# Patient Record
Sex: MALE | Race: WHITE | Employment: OTHER | URBAN - NONMETROPOLITAN AREA
[De-identification: names, ages, dates, MRNs, and addresses within clinical notes are randomized per-mention and may not be internally consistent; named-entity substitution may affect disease eponyms.]

---

## 2017-05-23 ENCOUNTER — OFFICE VISIT (OUTPATIENT)
Dept: OTOLARYNGOLOGY | Age: 47
End: 2017-05-23

## 2017-05-23 ENCOUNTER — TELEPHONE (OUTPATIENT)
Dept: OTOLARYNGOLOGY | Age: 47
End: 2017-05-23

## 2017-05-23 VITALS
HEART RATE: 120 BPM | WEIGHT: 142.3 LBS | SYSTOLIC BLOOD PRESSURE: 120 MMHG | HEIGHT: 72 IN | BODY MASS INDEX: 19.27 KG/M2 | TEMPERATURE: 98.2 F | DIASTOLIC BLOOD PRESSURE: 70 MMHG | RESPIRATION RATE: 20 BRPM

## 2017-05-23 DIAGNOSIS — D66 FACTOR VIII DEFICIENCY (HCC): ICD-10-CM

## 2017-05-23 DIAGNOSIS — C01 MALIGNANT NEOPLASM OF BASE OF TONGUE (HCC): ICD-10-CM

## 2017-05-23 DIAGNOSIS — F10.11 HISTORY OF ALCOHOL ABUSE: ICD-10-CM

## 2017-05-23 DIAGNOSIS — C32.8 MALIGNANT NEOPLASM OVERLAPPING LARYNX SITE (HCC): ICD-10-CM

## 2017-05-23 DIAGNOSIS — K08.9 POOR DENTITION: ICD-10-CM

## 2017-05-23 DIAGNOSIS — H61.22 IMPACTED CERUMEN OF LEFT EAR: ICD-10-CM

## 2017-05-23 DIAGNOSIS — C32.1 MALIGNANT NEOPLASM OF SUPRAGLOTTIS (HCC): Primary | ICD-10-CM

## 2017-05-23 DIAGNOSIS — F17.200 SMOKER: ICD-10-CM

## 2017-05-23 PROCEDURE — 31575 DIAGNOSTIC LARYNGOSCOPY: CPT | Performed by: OTOLARYNGOLOGY

## 2017-05-23 PROCEDURE — 99203 OFFICE O/P NEW LOW 30 MIN: CPT | Performed by: OTOLARYNGOLOGY

## 2017-05-23 ASSESSMENT — ENCOUNTER SYMPTOMS
RHINORRHEA: 0
WHEEZING: 0
SHORTNESS OF BREATH: 1
ABDOMINAL PAIN: 0
CHOKING: 0
COUGH: 1
VOMITING: 0
FACIAL SWELLING: 0
SORE THROAT: 0
CHEST TIGHTNESS: 0
APNEA: 0
SINUS PRESSURE: 0
DIARRHEA: 0
COLOR CHANGE: 0
TROUBLE SWALLOWING: 0
NAUSEA: 0
VOICE CHANGE: 0
STRIDOR: 0

## 2017-05-31 ENCOUNTER — TELEPHONE (OUTPATIENT)
Dept: OTOLARYNGOLOGY | Age: 47
End: 2017-05-31

## 2017-07-05 ENCOUNTER — TELEPHONE (OUTPATIENT)
Dept: FAMILY MEDICINE CLINIC | Age: 47
End: 2017-07-05

## 2017-07-12 ENCOUNTER — OFFICE VISIT (OUTPATIENT)
Dept: ONCOLOGY | Age: 47
End: 2017-07-12

## 2017-07-12 VITALS
WEIGHT: 132.2 LBS | OXYGEN SATURATION: 95 % | TEMPERATURE: 102 F | DIASTOLIC BLOOD PRESSURE: 69 MMHG | HEART RATE: 155 BPM | HEIGHT: 72 IN | RESPIRATION RATE: 20 BRPM | SYSTOLIC BLOOD PRESSURE: 97 MMHG | BODY MASS INDEX: 17.9 KG/M2

## 2017-07-12 DIAGNOSIS — D66 FACTOR VIII DEFICIENCY (HCC): Primary | ICD-10-CM

## 2017-07-12 DIAGNOSIS — C76.0 RECENT DIAGNOSIS OF HEAD AND NECK CANCER (HCC): ICD-10-CM

## 2017-07-12 PROCEDURE — 99205 OFFICE O/P NEW HI 60 MIN: CPT | Performed by: INTERNAL MEDICINE

## 2017-07-12 RX ORDER — ONDANSETRON 4 MG/1
4 TABLET, FILM COATED ORAL PRN
COMMUNITY
Start: 2017-06-14 | End: 2017-08-07 | Stop reason: SDUPTHER

## 2017-07-12 RX ORDER — OXYCODONE HCL 5 MG/5 ML
5-10 SOLUTION, ORAL ORAL EVERY 4 HOURS PRN
COMMUNITY
Start: 2017-07-07 | End: 2017-07-16 | Stop reason: SDUPTHER

## 2017-07-12 RX ORDER — POTASSIUM CHLORIDE 750 MG/1
20 CAPSULE, EXTENDED RELEASE ORAL 2 TIMES DAILY
COMMUNITY
Start: 2017-06-15 | End: 2017-07-15

## 2017-07-12 ASSESSMENT — ENCOUNTER SYMPTOMS
RECTAL PAIN: 0
NAUSEA: 0
FACIAL SWELLING: 0
ABDOMINAL DISTENTION: 0
EYE DISCHARGE: 0
COLOR CHANGE: 0
BLOOD IN STOOL: 0
BACK PAIN: 0
WHEEZING: 0
ABDOMINAL PAIN: 0
DIARRHEA: 0
VOMITING: 0

## 2017-07-16 ENCOUNTER — HOSPITAL ENCOUNTER (EMERGENCY)
Age: 47
Discharge: HOME | End: 2017-07-16
Attending: EMERGENCY MEDICINE
Payer: MEDICARE

## 2017-07-16 ENCOUNTER — APPOINTMENT (OUTPATIENT)
Dept: GENERAL RADIOLOGY | Age: 47
End: 2017-07-16
Payer: MEDICARE

## 2017-07-16 VITALS
HEART RATE: 107 BPM | WEIGHT: 132 LBS | OXYGEN SATURATION: 95 % | TEMPERATURE: 99.3 F | DIASTOLIC BLOOD PRESSURE: 69 MMHG | RESPIRATION RATE: 20 BRPM | HEIGHT: 71 IN | SYSTOLIC BLOOD PRESSURE: 105 MMHG | BODY MASS INDEX: 18.48 KG/M2

## 2017-07-16 DIAGNOSIS — J20.9 COPD WITH ACUTE BRONCHITIS (HCC): ICD-10-CM

## 2017-07-16 DIAGNOSIS — C44.42 SQUAMOUS CELL CARCINOMA OF NECK: Primary | ICD-10-CM

## 2017-07-16 DIAGNOSIS — C32.1 SQUAMOUS CELL CANCER OF EPIGLOTTIS (HCC): ICD-10-CM

## 2017-07-16 DIAGNOSIS — J44.0 COPD WITH ACUTE BRONCHITIS (HCC): ICD-10-CM

## 2017-07-16 DIAGNOSIS — J44.9 CHRONIC OBSTRUCTIVE PULMONARY DISEASE, UNSPECIFIED COPD TYPE (HCC): ICD-10-CM

## 2017-07-16 DIAGNOSIS — C02.9 SQUAMOUS CELL CANCER OF TONGUE (HCC): ICD-10-CM

## 2017-07-16 DIAGNOSIS — M54.2 NECK PAIN: ICD-10-CM

## 2017-07-16 LAB
ANION GAP SERPL CALCULATED.3IONS-SCNC: 17 MEQ/L (ref 8–16)
BASOPHILS # BLD: 0.8 %
BASOPHILS ABSOLUTE: 0.1 THOU/MM3 (ref 0–0.1)
BUN BLDV-MCNC: 10 MG/DL (ref 7–22)
CALCIUM SERPL-MCNC: 9.3 MG/DL (ref 8.5–10.5)
CHLORIDE BLD-SCNC: 96 MEQ/L (ref 98–111)
CO2: 26 MEQ/L (ref 23–33)
CREAT SERPL-MCNC: 0.7 MG/DL (ref 0.4–1.2)
EOSINOPHIL # BLD: 1.3 %
EOSINOPHILS ABSOLUTE: 0.1 THOU/MM3 (ref 0–0.4)
GFR SERPL CREATININE-BSD FRML MDRD: > 90 ML/MIN/1.73M2
GLUCOSE BLD-MCNC: 116 MG/DL (ref 70–108)
HCT VFR BLD CALC: 38 % (ref 42–52)
HEMOGLOBIN: 12.5 GM/DL (ref 14–18)
LYMPHOCYTES # BLD: 17.2 %
LYMPHOCYTES ABSOLUTE: 1.7 THOU/MM3 (ref 1–4.8)
MACROCYTES: ABNORMAL
MCH RBC QN AUTO: 32.7 PG (ref 27–31)
MCHC RBC AUTO-ENTMCNC: 32.8 GM/DL (ref 33–37)
MCV RBC AUTO: 99.6 FL (ref 80–94)
MONOCYTES # BLD: 8.7 %
MONOCYTES ABSOLUTE: 0.9 THOU/MM3 (ref 0.4–1.3)
NUCLEATED RED BLOOD CELLS: 0 /100 WBC
PDW BLD-RTO: 12.9 % (ref 11.5–14.5)
PLATELET # BLD: 647 THOU/MM3 (ref 130–400)
PMV BLD AUTO: 9.7 MCM (ref 7.4–10.4)
POTASSIUM SERPL-SCNC: 3.9 MEQ/L (ref 3.5–5.2)
RBC # BLD: 3.81 MILL/MM3 (ref 4.7–6.1)
RBC # BLD: NORMAL 10*6/UL
SEG NEUTROPHILS: 72 %
SEGMENTED NEUTROPHILS ABSOLUTE COUNT: 7.1 THOU/MM3 (ref 1.8–7.7)
SODIUM BLD-SCNC: 139 MEQ/L (ref 135–145)
WBC # BLD: 9.8 THOU/MM3 (ref 4.8–10.8)

## 2017-07-16 PROCEDURE — 2580000003 HC RX 258: Performed by: EMERGENCY MEDICINE

## 2017-07-16 PROCEDURE — 6370000000 HC RX 637 (ALT 250 FOR IP): Performed by: EMERGENCY MEDICINE

## 2017-07-16 PROCEDURE — 85025 COMPLETE CBC W/AUTO DIFF WBC: CPT

## 2017-07-16 PROCEDURE — 36415 COLL VENOUS BLD VENIPUNCTURE: CPT

## 2017-07-16 PROCEDURE — 87147 CULTURE TYPE IMMUNOLOGIC: CPT

## 2017-07-16 PROCEDURE — 87186 SC STD MICRODIL/AGAR DIL: CPT

## 2017-07-16 PROCEDURE — 99283 EMERGENCY DEPT VISIT LOW MDM: CPT

## 2017-07-16 PROCEDURE — 80048 BASIC METABOLIC PNL TOTAL CA: CPT

## 2017-07-16 PROCEDURE — 87077 CULTURE AEROBIC IDENTIFY: CPT

## 2017-07-16 PROCEDURE — 71020 XR CHEST STANDARD TWO VW: CPT

## 2017-07-16 PROCEDURE — 87070 CULTURE OTHR SPECIMN AEROBIC: CPT

## 2017-07-16 PROCEDURE — 87205 SMEAR GRAM STAIN: CPT

## 2017-07-16 RX ORDER — AZITHROMYCIN 200 MG/5ML
250 POWDER, FOR SUSPENSION ORAL DAILY
Qty: 25.2 ML | Refills: 0 | Status: SHIPPED | OUTPATIENT
Start: 2017-07-16 | End: 2017-07-20

## 2017-07-16 RX ORDER — SODIUM CHLORIDE 9 MG/ML
INJECTION, SOLUTION INTRAVENOUS CONTINUOUS
Status: DISCONTINUED | OUTPATIENT
Start: 2017-07-16 | End: 2017-07-17 | Stop reason: HOSPADM

## 2017-07-16 RX ORDER — OXYCODONE HCL 5 MG/5 ML
SOLUTION, ORAL ORAL
Qty: 300 ML | Refills: 0 | Status: SHIPPED | OUTPATIENT
Start: 2017-07-16 | End: 2017-08-01 | Stop reason: ALTCHOICE

## 2017-07-16 RX ORDER — OXYCODONE HYDROCHLORIDE AND ACETAMINOPHEN 5; 325 MG/1; MG/1
2 TABLET ORAL ONCE
Status: COMPLETED | OUTPATIENT
Start: 2017-07-16 | End: 2017-07-16

## 2017-07-16 RX ADMIN — SODIUM CHLORIDE: 9 INJECTION, SOLUTION INTRAVENOUS at 21:10

## 2017-07-16 RX ADMIN — OXYCODONE AND ACETAMINOPHEN 2 TABLET: 5; 325 TABLET ORAL at 21:10

## 2017-07-16 ASSESSMENT — ENCOUNTER SYMPTOMS
BACK PAIN: 0
SORE THROAT: 0
COUGH: 1
EYE REDNESS: 0
SHORTNESS OF BREATH: 0
WHEEZING: 0
DIARRHEA: 0
ABDOMINAL PAIN: 0
VOMITING: 0
EYE DISCHARGE: 0
RHINORRHEA: 0
NAUSEA: 0

## 2017-07-16 ASSESSMENT — PAIN SCALES - GENERAL: PAINLEVEL_OUTOF10: 7

## 2017-07-19 LAB
GRAM STAIN RESULT: ABNORMAL
ORGANISM: ABNORMAL
RESPIRATORY CULTURE: ABNORMAL
RESPIRATORY CULTURE: ABNORMAL

## 2017-07-21 ASSESSMENT — ENCOUNTER SYMPTOMS
COUGH: 1
SHORTNESS OF BREATH: 1
CONSTIPATION: 1
SORE THROAT: 1
TROUBLE SWALLOWING: 1
CHEST TIGHTNESS: 1
VOICE CHANGE: 1

## 2017-07-24 ENCOUNTER — TELEPHONE (OUTPATIENT)
Dept: ONCOLOGY | Age: 47
End: 2017-07-24

## 2017-07-26 ENCOUNTER — TELEPHONE (OUTPATIENT)
Dept: FAMILY MEDICINE CLINIC | Age: 47
End: 2017-07-26

## 2017-07-26 ENCOUNTER — OFFICE VISIT (OUTPATIENT)
Dept: ONCOLOGY | Age: 47
End: 2017-07-26
Payer: MEDICARE

## 2017-07-26 ENCOUNTER — HOSPITAL ENCOUNTER (OUTPATIENT)
Dept: INFUSION THERAPY | Age: 47
Discharge: HOME | End: 2017-07-26
Payer: MEDICARE

## 2017-07-26 ENCOUNTER — TELEPHONE (OUTPATIENT)
Dept: ONCOLOGY | Age: 47
End: 2017-07-26

## 2017-07-26 VITALS
OXYGEN SATURATION: 94 % | HEIGHT: 71 IN | HEART RATE: 136 BPM | WEIGHT: 129.6 LBS | DIASTOLIC BLOOD PRESSURE: 70 MMHG | TEMPERATURE: 97.6 F | RESPIRATION RATE: 20 BRPM | SYSTOLIC BLOOD PRESSURE: 102 MMHG | BODY MASS INDEX: 18.15 KG/M2

## 2017-07-26 DIAGNOSIS — C76.0 RECENT DIAGNOSIS OF HEAD AND NECK CANCER (HCC): ICD-10-CM

## 2017-07-26 DIAGNOSIS — F10.11 HISTORY OF ALCOHOL ABUSE: Primary | ICD-10-CM

## 2017-07-26 DIAGNOSIS — G89.3 CANCER RELATED PAIN: ICD-10-CM

## 2017-07-26 PROCEDURE — 99214 OFFICE O/P EST MOD 30 MIN: CPT | Performed by: INTERNAL MEDICINE

## 2017-07-26 PROCEDURE — 99211 OFF/OP EST MAY X REQ PHY/QHP: CPT

## 2017-07-26 RX ORDER — PALONOSETRON 0.05 MG/ML
0.25 INJECTION, SOLUTION INTRAVENOUS ONCE
Status: CANCELLED | OUTPATIENT
Start: 2017-08-21

## 2017-07-26 RX ORDER — 0.9 % SODIUM CHLORIDE 0.9 %
10 VIAL (ML) INJECTION ONCE
Status: CANCELLED | OUTPATIENT
Start: 2017-08-21 | End: 2017-08-07

## 2017-07-26 RX ORDER — HEPARIN SODIUM (PORCINE) LOCK FLUSH IV SOLN 100 UNIT/ML 100 UNIT/ML
500 SOLUTION INTRAVENOUS PRN
Status: CANCELLED | OUTPATIENT
Start: 2017-08-21

## 2017-07-26 RX ORDER — DIPHENHYDRAMINE HYDROCHLORIDE 50 MG/ML
50 INJECTION INTRAMUSCULAR; INTRAVENOUS ONCE
Status: CANCELLED | OUTPATIENT
Start: 2017-08-21 | End: 2017-08-07

## 2017-07-26 RX ORDER — SODIUM CHLORIDE 0.9 % (FLUSH) 0.9 %
5 SYRINGE (ML) INJECTION PRN
Status: CANCELLED | OUTPATIENT
Start: 2017-08-21

## 2017-07-26 RX ORDER — METHYLPREDNISOLONE SODIUM SUCCINATE 125 MG/2ML
125 INJECTION, POWDER, LYOPHILIZED, FOR SOLUTION INTRAMUSCULAR; INTRAVENOUS ONCE
Status: CANCELLED | OUTPATIENT
Start: 2017-08-21 | End: 2017-08-07

## 2017-07-26 RX ORDER — SODIUM CHLORIDE 0.9 % (FLUSH) 0.9 %
10 SYRINGE (ML) INJECTION PRN
Status: CANCELLED | OUTPATIENT
Start: 2017-08-21

## 2017-07-26 RX ORDER — SODIUM CHLORIDE 9 MG/ML
INJECTION, SOLUTION INTRAVENOUS ONCE
Status: CANCELLED | OUTPATIENT
Start: 2017-08-21 | End: 2017-08-07

## 2017-07-26 RX ORDER — SODIUM CHLORIDE 9 MG/ML
INJECTION, SOLUTION INTRAVENOUS CONTINUOUS
Status: CANCELLED | OUTPATIENT
Start: 2017-08-21

## 2017-07-26 RX ORDER — HYDROCODONE BITARTRATE AND ACETAMINOPHEN 5; 325 MG/1; MG/1
1 TABLET ORAL EVERY 6 HOURS PRN
Qty: 60 TABLET | Refills: 0 | Status: SHIPPED | OUTPATIENT
Start: 2017-07-26 | End: 2017-08-01 | Stop reason: ALTCHOICE

## 2017-07-26 ASSESSMENT — ENCOUNTER SYMPTOMS
SORE THROAT: 1
BLOOD IN STOOL: 0
CONSTIPATION: 1
FACIAL SWELLING: 0
BACK PAIN: 0
WHEEZING: 0
ABDOMINAL PAIN: 0
DIARRHEA: 0
COLOR CHANGE: 0
RECTAL PAIN: 0
NAUSEA: 0
CHEST TIGHTNESS: 1
ABDOMINAL DISTENTION: 0
SHORTNESS OF BREATH: 1
COUGH: 1
VOMITING: 0
VOICE CHANGE: 1
EYE DISCHARGE: 0
TROUBLE SWALLOWING: 1

## 2017-07-31 ENCOUNTER — HOSPITAL ENCOUNTER (EMERGENCY)
Age: 47
Discharge: HOME | End: 2017-07-31
Attending: FAMILY MEDICINE
Payer: MEDICARE

## 2017-07-31 ENCOUNTER — TELEPHONE (OUTPATIENT)
Dept: ONCOLOGY | Age: 47
End: 2017-07-31

## 2017-07-31 VITALS
TEMPERATURE: 97.7 F | HEART RATE: 109 BPM | WEIGHT: 129 LBS | HEIGHT: 70 IN | RESPIRATION RATE: 22 BRPM | DIASTOLIC BLOOD PRESSURE: 91 MMHG | OXYGEN SATURATION: 98 % | BODY MASS INDEX: 18.47 KG/M2 | SYSTOLIC BLOOD PRESSURE: 118 MMHG

## 2017-07-31 DIAGNOSIS — C32.1 CANCER, EPIGLOTTIS (HCC): ICD-10-CM

## 2017-07-31 DIAGNOSIS — M54.2 NECK PAIN: Primary | ICD-10-CM

## 2017-07-31 PROCEDURE — 6360000002 HC RX W HCPCS: Performed by: FAMILY MEDICINE

## 2017-07-31 PROCEDURE — 99283 EMERGENCY DEPT VISIT LOW MDM: CPT

## 2017-07-31 PROCEDURE — 96372 THER/PROPH/DIAG INJ SC/IM: CPT

## 2017-07-31 RX ORDER — MORPHINE SULFATE 10 MG/5ML
5-10 SOLUTION ORAL EVERY 6 HOURS PRN
Qty: 100 ML | Refills: 0 | Status: SHIPPED | OUTPATIENT
Start: 2017-07-31 | End: 2017-08-03 | Stop reason: SDUPTHER

## 2017-07-31 RX ADMIN — HYDROMORPHONE HYDROCHLORIDE 2 MG: 1 INJECTION, SOLUTION INTRAMUSCULAR; INTRAVENOUS; SUBCUTANEOUS at 15:29

## 2017-07-31 ASSESSMENT — PAIN SCALES - GENERAL
PAINLEVEL_OUTOF10: 10
PAINLEVEL_OUTOF10: 10

## 2017-07-31 ASSESSMENT — ENCOUNTER SYMPTOMS
COUGH: 1
WHEEZING: 0
EYE DISCHARGE: 0
ABDOMINAL PAIN: 0

## 2017-07-31 ASSESSMENT — PAIN DESCRIPTION - LOCATION: LOCATION: NECK

## 2017-07-31 ASSESSMENT — PAIN DESCRIPTION - PAIN TYPE: TYPE: ACUTE PAIN

## 2017-08-01 ENCOUNTER — OFFICE VISIT (OUTPATIENT)
Dept: PHYSICAL MEDICINE AND REHAB | Age: 47
End: 2017-08-01
Payer: MEDICARE

## 2017-08-01 VITALS
BODY MASS INDEX: 18.78 KG/M2 | SYSTOLIC BLOOD PRESSURE: 113 MMHG | HEART RATE: 130 BPM | WEIGHT: 131.2 LBS | HEIGHT: 70 IN | DIASTOLIC BLOOD PRESSURE: 80 MMHG

## 2017-08-01 DIAGNOSIS — C10.0 CANCER OF VALLECULA EPIGLOTTICA (HCC): Primary | ICD-10-CM

## 2017-08-01 DIAGNOSIS — F10.11 HISTORY OF ALCOHOL ABUSE: ICD-10-CM

## 2017-08-01 DIAGNOSIS — F17.219 CIGARETTE NICOTINE DEPENDENCE WITH NICOTINE-INDUCED DISORDER: ICD-10-CM

## 2017-08-01 PROCEDURE — 99244 OFF/OP CNSLTJ NEW/EST MOD 40: CPT | Performed by: PAIN MEDICINE

## 2017-08-01 RX ORDER — POTASSIUM CHLORIDE 1500 MG/1
TABLET, FILM COATED, EXTENDED RELEASE ORAL
COMMUNITY
End: 2017-08-16 | Stop reason: CLARIF

## 2017-08-01 RX ORDER — FENTANYL 25 UG/H
1 PATCH TRANSDERMAL
Qty: 5 PATCH | Refills: 0 | Status: SHIPPED | OUTPATIENT
Start: 2017-08-01 | End: 2017-10-03 | Stop reason: CLARIF

## 2017-08-01 RX ORDER — POTASSIUM CHLORIDE 20MEQ/15ML
LIQUID (ML) ORAL
Refills: 0 | COMMUNITY
Start: 2017-06-20 | End: 2017-09-11 | Stop reason: CLARIF

## 2017-08-01 ASSESSMENT — ENCOUNTER SYMPTOMS
DIARRHEA: 0
BACK PAIN: 0
SINUS PRESSURE: 0
WHEEZING: 0
PHOTOPHOBIA: 0
ABDOMINAL PAIN: 0
EYE PAIN: 0
SORE THROAT: 0
NAUSEA: 0
RHINORRHEA: 0
VOMITING: 0
SHORTNESS OF BREATH: 0
COUGH: 0
CONSTIPATION: 0
COLOR CHANGE: 0
CHEST TIGHTNESS: 0

## 2017-08-03 ENCOUNTER — TELEPHONE (OUTPATIENT)
Dept: PHYSICAL MEDICINE AND REHAB | Age: 47
End: 2017-08-03

## 2017-08-03 DIAGNOSIS — F17.219 CIGARETTE NICOTINE DEPENDENCE WITH NICOTINE-INDUCED DISORDER: ICD-10-CM

## 2017-08-03 DIAGNOSIS — F10.11 HISTORY OF ALCOHOL ABUSE: ICD-10-CM

## 2017-08-03 DIAGNOSIS — C10.0 CANCER OF VALLECULA EPIGLOTTICA (HCC): ICD-10-CM

## 2017-08-03 RX ORDER — MORPHINE SULFATE 10 MG/5ML
10 SOLUTION ORAL EVERY 6 HOURS PRN
Qty: 600 ML | Refills: 0 | Status: SHIPPED | OUTPATIENT
Start: 2017-08-03 | End: 2017-08-17 | Stop reason: SDUPTHER

## 2017-08-04 ENCOUNTER — TELEPHONE (OUTPATIENT)
Dept: PHYSICAL MEDICINE AND REHAB | Age: 47
End: 2017-08-04

## 2017-08-07 ENCOUNTER — HOSPITAL ENCOUNTER (OUTPATIENT)
Dept: INFUSION THERAPY | Age: 47
Discharge: HOME | End: 2017-08-07
Payer: MEDICARE

## 2017-08-07 ENCOUNTER — TELEPHONE (OUTPATIENT)
Dept: PHYSICAL MEDICINE AND REHAB | Age: 47
End: 2017-08-07

## 2017-08-07 DIAGNOSIS — G89.3 CANCER RELATED PAIN: ICD-10-CM

## 2017-08-07 DIAGNOSIS — F10.11 HISTORY OF ALCOHOL ABUSE: ICD-10-CM

## 2017-08-07 DIAGNOSIS — C76.0 RECENT DIAGNOSIS OF HEAD AND NECK CANCER (HCC): ICD-10-CM

## 2017-08-07 RX ORDER — ONDANSETRON 4 MG/1
4 TABLET, FILM COATED ORAL EVERY 8 HOURS PRN
Qty: 30 TABLET | Refills: 1 | Status: SHIPPED | OUTPATIENT
Start: 2017-08-07 | End: 2017-10-03 | Stop reason: CLARIF

## 2017-08-08 ENCOUNTER — TELEPHONE (OUTPATIENT)
Dept: PHYSICAL MEDICINE AND REHAB | Age: 47
End: 2017-08-08

## 2017-08-11 ENCOUNTER — HOSPITAL ENCOUNTER (OUTPATIENT)
Dept: RADIATION ONCOLOGY | Age: 47
Discharge: HOME | End: 2017-08-11
Payer: MEDICARE

## 2017-08-11 PROCEDURE — 99202 OFFICE O/P NEW SF 15 MIN: CPT | Performed by: RADIOLOGY

## 2017-08-11 RX ORDER — FENTANYL 50 UG/H
1 PATCH TRANSDERMAL
Qty: 10 PATCH | Refills: 0 | Status: SHIPPED | OUTPATIENT
Start: 2017-08-11 | End: 2017-09-10

## 2017-08-14 ENCOUNTER — HOSPITAL ENCOUNTER (OUTPATIENT)
Dept: RADIATION ONCOLOGY | Age: 47
Discharge: HOME | End: 2017-08-14
Payer: MEDICARE

## 2017-08-14 ENCOUNTER — HOSPITAL ENCOUNTER (OUTPATIENT)
Dept: CT IMAGING | Age: 47
Discharge: HOME | End: 2017-08-14
Payer: MEDICARE

## 2017-08-14 DIAGNOSIS — C32.8 MALIGNANT NEOPLASM OF OVERLAPPING SITES OF LARYNX (HCC): ICD-10-CM

## 2017-08-14 PROCEDURE — 77334 RADIATION TREATMENT AID(S): CPT | Performed by: RADIOLOGY

## 2017-08-14 PROCEDURE — 77014 CT GUIDE PLACEMENT RADIATION THERAPY: CPT

## 2017-08-14 PROCEDURE — 77470 SPECIAL RADIATION TREATMENT: CPT | Performed by: RADIOLOGY

## 2017-08-14 PROCEDURE — 77332 RADIATION TREATMENT AID(S): CPT | Performed by: RADIOLOGY

## 2017-08-16 ENCOUNTER — OFFICE VISIT (OUTPATIENT)
Dept: PHYSICAL MEDICINE AND REHAB | Age: 47
End: 2017-08-16
Payer: MEDICARE

## 2017-08-16 VITALS
HEIGHT: 70 IN | DIASTOLIC BLOOD PRESSURE: 80 MMHG | WEIGHT: 131 LBS | SYSTOLIC BLOOD PRESSURE: 117 MMHG | BODY MASS INDEX: 18.75 KG/M2 | HEART RATE: 120 BPM

## 2017-08-16 DIAGNOSIS — M54.2 NECK PAIN: ICD-10-CM

## 2017-08-16 DIAGNOSIS — C80.1 NECK PAIN DUE TO MALIGNANT NEOPLASTIC DISEASE (HCC): ICD-10-CM

## 2017-08-16 DIAGNOSIS — M54.2 NECK PAIN DUE TO MALIGNANT NEOPLASTIC DISEASE (HCC): ICD-10-CM

## 2017-08-16 DIAGNOSIS — C10.0 CANCER OF VALLECULA EPIGLOTTICA (HCC): Primary | ICD-10-CM

## 2017-08-16 PROCEDURE — 99213 OFFICE O/P EST LOW 20 MIN: CPT | Performed by: NURSE PRACTITIONER

## 2017-08-16 ASSESSMENT — ENCOUNTER SYMPTOMS
TROUBLE SWALLOWING: 0
APNEA: 0
RHINORRHEA: 0
COLOR CHANGE: 0
COUGH: 0
VOMITING: 0
FACIAL SWELLING: 0
VOICE CHANGE: 0
BACK PAIN: 0
CHEST TIGHTNESS: 0
ABDOMINAL DISTENTION: 0
NAUSEA: 0
PHOTOPHOBIA: 0
ANAL BLEEDING: 0
SINUS PRESSURE: 0
CONSTIPATION: 0
DIARRHEA: 0
EYE DISCHARGE: 0
SORE THROAT: 0
EYE ITCHING: 0
EYE REDNESS: 0
SHORTNESS OF BREATH: 0
BLOOD IN STOOL: 0
STRIDOR: 0
RECTAL PAIN: 0
EYE PAIN: 0
ABDOMINAL PAIN: 0
WHEEZING: 0
CHOKING: 0

## 2017-08-17 ENCOUNTER — TELEPHONE (OUTPATIENT)
Dept: PHYSICAL MEDICINE AND REHAB | Age: 47
End: 2017-08-17

## 2017-08-17 PROCEDURE — 77301 RADIOTHERAPY DOSE PLAN IMRT: CPT | Performed by: RADIOLOGY

## 2017-08-17 PROCEDURE — 77300 RADIATION THERAPY DOSE PLAN: CPT | Performed by: RADIOLOGY

## 2017-08-17 RX ORDER — MORPHINE SULFATE 10 MG/5ML
20 SOLUTION ORAL EVERY 6 HOURS PRN
Qty: 200 ML | Refills: 0 | Status: SHIPPED | OUTPATIENT
Start: 2017-08-17 | End: 2017-08-22

## 2017-08-18 DIAGNOSIS — C76.0 RECENT DIAGNOSIS OF HEAD AND NECK CANCER (HCC): Primary | ICD-10-CM

## 2017-08-21 ENCOUNTER — OFFICE VISIT (OUTPATIENT)
Dept: ONCOLOGY | Age: 47
End: 2017-08-21
Payer: MEDICARE

## 2017-08-21 ENCOUNTER — HOSPITAL ENCOUNTER (OUTPATIENT)
Dept: INFUSION THERAPY | Age: 47
Discharge: HOME | End: 2017-08-21
Payer: MEDICARE

## 2017-08-21 ENCOUNTER — HOSPITAL ENCOUNTER (OUTPATIENT)
Dept: RADIATION ONCOLOGY | Age: 47
Discharge: HOME | End: 2017-08-21
Payer: MEDICARE

## 2017-08-21 VITALS
DIASTOLIC BLOOD PRESSURE: 63 MMHG | SYSTOLIC BLOOD PRESSURE: 110 MMHG | HEIGHT: 70 IN | RESPIRATION RATE: 20 BRPM | TEMPERATURE: 97.8 F | HEART RATE: 98 BPM | BODY MASS INDEX: 17.72 KG/M2 | OXYGEN SATURATION: 98 % | WEIGHT: 123.8 LBS

## 2017-08-21 VITALS
HEIGHT: 70 IN | WEIGHT: 123.8 LBS | RESPIRATION RATE: 20 BRPM | OXYGEN SATURATION: 94 % | SYSTOLIC BLOOD PRESSURE: 119 MMHG | DIASTOLIC BLOOD PRESSURE: 90 MMHG | BODY MASS INDEX: 17.72 KG/M2 | TEMPERATURE: 98.6 F | HEART RATE: 130 BPM

## 2017-08-21 DIAGNOSIS — C76.0 RECENT DIAGNOSIS OF HEAD AND NECK CANCER (HCC): ICD-10-CM

## 2017-08-21 DIAGNOSIS — F10.11 HISTORY OF ALCOHOL ABUSE: ICD-10-CM

## 2017-08-21 DIAGNOSIS — J44.9 CHRONIC OBSTRUCTIVE PULMONARY DISEASE, UNSPECIFIED COPD TYPE (HCC): ICD-10-CM

## 2017-08-21 DIAGNOSIS — Z51.11 CHEMOTHERAPY MANAGEMENT, ENCOUNTER FOR: ICD-10-CM

## 2017-08-21 DIAGNOSIS — C76.0 RECENT DIAGNOSIS OF HEAD AND NECK CANCER (HCC): Primary | ICD-10-CM

## 2017-08-21 LAB
ALBUMIN SERPL-MCNC: 3.7 G/DL (ref 3.5–5.1)
ALP BLD-CCNC: 72 U/L (ref 38–126)
ALT SERPL-CCNC: 7 U/L (ref 11–66)
AST SERPL-CCNC: 14 U/L (ref 5–40)
BASINOPHIL, AUTOMATED: 1 % (ref 0–12)
BILIRUB SERPL-MCNC: 0.4 MG/DL (ref 0.3–1.2)
BILIRUBIN DIRECT: < 0.2 MG/DL (ref 0–0.3)
BUN, WHOLE BLOOD: 10 MG/DL (ref 8–26)
CHLORIDE, WHOLE BLOOD: 97 MEQ/L (ref 98–109)
CREATININE, WHOLE BLOOD: 0.7 MG/DL (ref 0.5–1.2)
EOSINOPHILS RELATIVE PERCENT: 2 % (ref 0–12)
GFR, ESTIMATED: > 90 ML/MIN/1.73M2
GLUCOSE, WHOLE BLOOD: 123 MG/DL (ref 70–108)
HCT VFR BLD CALC: 42 % (ref 42–52)
HEMOGLOBIN: 13.8 GM/DL (ref 14–18)
IONIZED CALCIUM, WHOLE BLOOD: 1.24 MMOL/L (ref 1.12–1.32)
LYMPHOCYTES # BLD: 18 % (ref 15–47)
MCH RBC QN AUTO: 32.3 PG (ref 27–31)
MCHC RBC AUTO-ENTMCNC: 32.8 GM/DL (ref 33–37)
MCV RBC AUTO: 98 FL (ref 80–94)
MONOCYTES: 7 % (ref 0–12)
PDW BLD-RTO: 10.5 % (ref 11.5–14.5)
PLATELET # BLD: 691 THOU/MM3 (ref 130–400)
PMV BLD AUTO: 7.7 MCM (ref 7.4–10.4)
POTASSIUM, WHOLE BLOOD: 3.4 MEQ/L (ref 3.5–4.9)
RBC # BLD: 4.28 MILL/MM3 (ref 4.7–6.1)
SEG NEUTROPHILS: 72 % (ref 43–75)
SODIUM, WHOLE BLOOD: 137 MEQ/L (ref 138–146)
TOTAL CO2, WHOLE BLOOD: 27 MEQ/L (ref 23–33)
TOTAL PROTEIN: 8.3 G/DL (ref 6.1–8)
WBC # BLD: 12.3 THOU/MM3 (ref 4.8–10.8)

## 2017-08-21 PROCEDURE — 85025 COMPLETE CBC W/AUTO DIFF WBC: CPT

## 2017-08-21 PROCEDURE — 77338 DESIGN MLC DEVICE FOR IMRT: CPT | Performed by: RADIOLOGY

## 2017-08-21 PROCEDURE — 36415 COLL VENOUS BLD VENIPUNCTURE: CPT

## 2017-08-21 PROCEDURE — 96375 TX/PRO/DX INJ NEW DRUG ADDON: CPT

## 2017-08-21 PROCEDURE — 6360000002 HC RX W HCPCS: Performed by: INTERNAL MEDICINE

## 2017-08-21 PROCEDURE — 80076 HEPATIC FUNCTION PANEL: CPT

## 2017-08-21 PROCEDURE — 80047 BASIC METABLC PNL IONIZED CA: CPT

## 2017-08-21 PROCEDURE — 96366 THER/PROPH/DIAG IV INF ADDON: CPT

## 2017-08-21 PROCEDURE — 96415 CHEMO IV INFUSION ADDL HR: CPT

## 2017-08-21 PROCEDURE — 99214 OFFICE O/P EST MOD 30 MIN: CPT | Performed by: INTERNAL MEDICINE

## 2017-08-21 PROCEDURE — 96367 TX/PROPH/DG ADDL SEQ IV INF: CPT

## 2017-08-21 PROCEDURE — 2580000003 HC RX 258: Performed by: INTERNAL MEDICINE

## 2017-08-21 PROCEDURE — G0463 HOSPITAL OUTPT CLINIC VISIT: HCPCS

## 2017-08-21 PROCEDURE — 96413 CHEMO IV INFUSION 1 HR: CPT

## 2017-08-21 RX ORDER — PROMETHAZINE HYDROCHLORIDE 6.25 MG/5ML
6.25 SYRUP ORAL EVERY 6 HOURS PRN
Qty: 240 ML | Refills: 1 | Status: SHIPPED | OUTPATIENT
Start: 2017-08-21 | End: 2017-08-28

## 2017-08-21 RX ORDER — DEXAMETHASONE 4 MG/1
8 TABLET ORAL
Qty: 6 TABLET | Refills: 2 | Status: SHIPPED | OUTPATIENT
Start: 2017-08-22 | End: 2017-08-25

## 2017-08-21 RX ORDER — SODIUM CHLORIDE 9 MG/ML
INJECTION, SOLUTION INTRAVENOUS ONCE
Status: COMPLETED | OUTPATIENT
Start: 2017-08-21 | End: 2017-08-21

## 2017-08-21 RX ORDER — PALONOSETRON 0.05 MG/ML
0.25 INJECTION, SOLUTION INTRAVENOUS ONCE
Status: COMPLETED | OUTPATIENT
Start: 2017-08-21 | End: 2017-08-21

## 2017-08-21 RX ADMIN — SODIUM CHLORIDE 150 MG: 9 INJECTION, SOLUTION INTRAVENOUS at 12:33

## 2017-08-21 RX ADMIN — DEXAMETHASONE SODIUM PHOSPHATE 12 MG: 4 INJECTION, SOLUTION INTRAMUSCULAR; INTRAVENOUS at 12:03

## 2017-08-21 RX ADMIN — POTASSIUM CHLORIDE: 2 INJECTION, SOLUTION, CONCENTRATE INTRAVENOUS at 10:58

## 2017-08-21 RX ADMIN — POTASSIUM CHLORIDE: 2 INJECTION, SOLUTION, CONCENTRATE INTRAVENOUS at 16:23

## 2017-08-21 RX ADMIN — MANNITOL: 250 INJECTION, SOLUTION INTRAVENOUS at 13:13

## 2017-08-21 RX ADMIN — SODIUM CHLORIDE: 9 INJECTION, SOLUTION INTRAVENOUS at 10:55

## 2017-08-21 RX ADMIN — PALONOSETRON HYDROCHLORIDE 0.25 MG: 0.25 INJECTION INTRAVENOUS at 12:02

## 2017-08-21 ASSESSMENT — PAIN DESCRIPTION - PROGRESSION: CLINICAL_PROGRESSION: NOT CHANGED

## 2017-08-21 ASSESSMENT — ENCOUNTER SYMPTOMS
COUGH: 1
FACIAL SWELLING: 0
DIARRHEA: 0
RECTAL PAIN: 0
COLOR CHANGE: 0
SHORTNESS OF BREATH: 1
CONSTIPATION: 1
ABDOMINAL DISTENTION: 0
SORE THROAT: 1
TROUBLE SWALLOWING: 1
BACK PAIN: 0
BLOOD IN STOOL: 0
ABDOMINAL PAIN: 0
CHEST TIGHTNESS: 1
NAUSEA: 0
WHEEZING: 0
VOICE CHANGE: 1
VOMITING: 0
EYE DISCHARGE: 0

## 2017-08-21 ASSESSMENT — PAIN SCALES - GENERAL
PAINLEVEL_OUTOF10: 8
PAINLEVEL_OUTOF10: 7

## 2017-08-21 ASSESSMENT — PAIN DESCRIPTION - ONSET: ONSET: ON-GOING

## 2017-08-21 ASSESSMENT — PAIN DESCRIPTION - FREQUENCY: FREQUENCY: CONTINUOUS

## 2017-08-21 ASSESSMENT — PAIN DESCRIPTION - LOCATION
LOCATION: NECK
LOCATION: NECK

## 2017-08-21 ASSESSMENT — PAIN DESCRIPTION - PAIN TYPE
TYPE: CHRONIC PAIN
TYPE: CHRONIC PAIN

## 2017-08-21 ASSESSMENT — PAIN DESCRIPTION - ORIENTATION
ORIENTATION: LEFT
ORIENTATION: LEFT

## 2017-08-21 ASSESSMENT — PAIN DESCRIPTION - DESCRIPTORS: DESCRIPTORS: CONSTANT

## 2017-08-21 NOTE — IP AVS SNAPSHOT
Patient Information     Patient Name MEE Headley 1970         This is your updated medication list to keep with you all times      ASK your doctor about these medications     albuterol sulfate  (90 Base) MCG/ACT inhaler   Commonly known as:  PROVENTIL HFA   Inhale 2 puffs into the lungs every 4 hours as needed for Wheezing or Shortness of Breath (Space out to every 6 hours as symptoms improve) Space out to every 6 hours as symptoms improve. dexamethasone 4 MG tablet   Commonly known as:  DECADRON   Take 2 tablets by mouth daily (with breakfast) for 3 doses   Start taking on:  2017       * fentaNYL 25 MCG/HR   Commonly known as:  [address] 1 patch onto the skin every 72 hours  Epiglottic CANCER. * fentaNYL 50 MCG/HR   Commonly known as:  [address] 1 patch onto the skin every 72 hours . * fentaNYL 600 MCG Liqd   Commonly known as:  SUBSYS   Place 600 mcg under the tongue every 6 hours as needed for Pain . Handicap Placard Misc   by Does not apply route Issued disability placard from 2017 till 2018       morphine 10 MG/5ML solution   Take 10 mLs by mouth every 6 hours as needed for Pain  INCREASED DOSE. CANCER PAIN. omeprazole 2 MG/ML Susp 2 mg/mL oral suspension   Commonly known as:  PRILOSEC   Take 20 mLs by mouth Daily       ondansetron 4 MG tablet   Commonly known as:  ZOFRAN   Take 1 tablet by mouth every 8 hours as needed (For nausea)       potassium chloride 20 MEQ/15ML (10%) oral solution       promethazine 6.25 MG/5ML syrup   Commonly known as:  PHENERGAN   5 mLs by Per G Tube route every 6 hours as needed for Nausea       * Notice: This list has 3 medication(s) that are the same as other medications prescribed for you. Read the directions carefully, and ask your doctor or other care provider to review them with you.

## 2017-08-21 NOTE — IP AVS SNAPSHOT
After Visit Summary  (Discharge Instructions)    Medication List for Home    Based on the information you provided to us as well as any changes during this visit, the following is your updated medication list.  Compare this with your prescription bottles at home. If you have any questions or concerns, contact your primary care physician's office. Daily Medication List (This medication list can be shared with any healthcare provider who is helping you manage your medications)      ASK your doctor about these medications if you have questions        Last Dose    Next Dose Due AM NOON PM NIGHT    albuterol sulfate  (90 Base) MCG/ACT inhaler   Commonly known as:  PROVENTIL HFA   Inhale 2 puffs into the lungs every 4 hours as needed for Wheezing or Shortness of Breath (Space out to every 6 hours as symptoms improve) Space out to every 6 hours as symptoms improve. dexamethasone 4 MG tablet   Commonly known as:  DECADRON   Take 2 tablets by mouth daily (with breakfast) for 3 doses   Start taking on:  8/22/2017                                         fentaNYL 25 MCG/HR   Commonly known as:  [address] 1 patch onto the skin every 72 hours  Epiglottic CANCER. What changed:  Another medication with the same name was added. Make sure you understand how and when to take each. fentaNYL 50 MCG/HR   Commonly known as:  [address] 1 patch onto the skin every 72 hours . What changed:  Another medication with the same name was added. Make sure you understand how and when to take each. fentaNYL 600 MCG Liqd   Commonly known as:  SUBSYS   Place 600 mcg under the tongue every 6 hours as needed for Pain . What changed: You were already taking a medication with the same name, and this prescription was added. Make sure you understand how and when to take each. Handicap Placard Misc   by Does not apply route Issued disability placard from August 21, 2017 till February 2018   What changed:  additional instructions                                         morphine 10 MG/5ML solution   Take 10 mLs by mouth every 6 hours as needed for Pain  INCREASED DOSE. CANCER PAIN. omeprazole 2 MG/ML Susp 2 mg/mL oral suspension   Commonly known as:  PRILOSEC   Take 20 mLs by mouth Daily                                         ondansetron 4 MG tablet   Commonly known as:  ZOFRAN   Take 1 tablet by mouth every 8 hours as needed (For nausea)                                         potassium chloride 20 MEQ/15ML (10%) oral solution   TAKE 15 MLS BY MOUTH TWICE DAILY FOR 10 DAYS                                         promethazine 6.25 MG/5ML syrup   Commonly known as:  PHENERGAN   5 mLs by Per G Tube route every 6 hours as needed for Nausea                                              Where to Get Your Medications      These medications were sent to [address] [facility], [address] [phone] - F [phone]  [address] [place] [address], [address] [place] [address]     Phone:  [phone]     dexamethasone 4 MG tablet    promethazine 6.25 MG/5ML syrup         These medications were sent to [address], [facility] [address] [place], [place] [address]     Phone:  [phone]     fentaNYL 600 MCG Liqd         You can get these medications from any pharmacy     Bring a paper prescription for each of these medications     Handicap Placard Misc               Allergies as of 8/21/2017     No Known Allergies      Immunizations as of 8/21/2017     No immunizations on file.       Last Vitals          Most Recent Value    Temp  98.7 °F (37.1 °C)    Pulse  124    Resp  20    BP  126/79         After Visit Summary This summary was created for you. Thank you for entrusting your care to us. The following information includes details about your hospital/visit stay along with steps you should take to help with your recovery once you leave the hospital.  In this packet, you will find information about the topics listed below:    · Instructions about your medications including a list of your home medications  · A summary of your hospital visit  · Follow-up appointments once you have left the hospital  · Your care plan at home      You may receive a survey regarding the care you received during your stay. Your input is valuable to us. We encourage you to complete and return your survey in the envelope provided. We hope you will choose us in the future for your healthcare needs. Patient Information     Patient Name MEE Monahan Smoker 1970      Care Provided at:     Name Address Phone       [address] [phone]            Your Visit    Here you will find information about your visit, including the reason for your visit. Please take this sheet with you when you visit your doctor or other health care provider in the future. It will help determine the best possible medical care for you at that time. If you have any questions once you leave the hospital, please call the department phone number listed below. Why you were here     Your primary diagnosis was:  Not on File      Visit Information     Date & Time Department Dept. Phone    2017 [facility] [phone]       Follow-up Appointments    Below is a list of your follow-up and future appointments. This may not be a complete list as you may have made appointments directly with providers that we are not aware of or your providers may have made some for you. Please call your providers to confirm appointments. It is important to keep your appointments. Please bring your current insurance card, photo ID, co-pay, and all medication bottles to your appointment. If self-pay, payment is expected at the time of service. Future Appointments     8/28/2017 8:30 AM     Appointment with Vargas Lopez MD at [facility] ([phone])   Please arrive 15 minutes prior to appointment, bring photo ID and insurance card. Please arrive 15 minutes prior to appointment, bring photo ID and insurance card. [address] [place] [id number]       8/31/2017 11:50 AM     Appointment with Bhavik Ferrer CNP at [address] [facility] ([phone])   Please arrive 15 minutes prior to appointment, bring photo ID and insurance card. [address] [facility] [address]  [facility] [address]       9/4/2017 9:30 AM     Appointment with [facility] PT ONC BED 1 at [address] [place] [address] ([phone])   [address] [place] [address], [address]  [address] [place] [address] Drive        Date Due    HIV screening is recommended for all people regardless of risk factors  aged 15-65 years at least once (lifetime) who have never been HIV tested. 12/14/1986    Tetanus Combination Vaccine (1 - Tdap) 12/14/1990    Pneumococcal Vaccine - Pneumovax for adults aged 19-64 years with: chronic heart disease, chronic lung disease, diabetes mellitus, alcoholism, chronic liver disease, or cigarette smoking. (1 of 1 - PPSV23) 12/14/1990    Cholesterol Screening 12/14/2011    Yearly Flu Vaccine (1) 8/1/2017                 Care Plan Once You Return Home    This section includes instructions you will need to follow once you leave the hospital.  Your care team will discuss these with you, so you and those caring for you know how to best care for your health needs at home. This section may also include educational information about certain health topics that may be of help to you.           Discharge Instructions Please contact your Oncologist if you have any questions regarding the chemotherapy Cisplatin you received today. Call if any uncontrolled nausea or vomiting   Call if any fever,chills, problems or concerns  Call if any questions  Drink at least 6 - 8 ounces glasses of fluids a day    Patient to watch for any:    Dizziness     Lightheadedness        Acute nausea/vomiting   Headache     Chest pain/pressure    Rash/itching     Shortness of breath      Patient instructed if he experiences any of the above symptoms following today's chemotherapy ,he is to notify MD immediately or go to the emergency department. Important information for a smoker       SMOKING: QUIT SMOKING. THIS IS THE MOST IMPORTANT ACTION YOU CAN TAKE TO IMPROVE YOUR CURRENT AND FUTURE HEALTH. Call the [facility] ([phone])    Smoking harms nonsmokers. When nonsmokers are around people who smoke, they absorb nicotine, carbon monoxide, and other ingredients of tobacco smoke. DO NOT SMOKE AROUND CHILDREN     Children exposed to secondhand smoke are at an increased risk of:  Sudden Infant Death Syndrome (SIDS), acute respiratory infections, inflammation of the middle ear, and severe asthma. Over a longer time, it causes heart disease and lung cancer. There is no safe level of exposure to secondhand smoke. [employer] Signup     VONTRAVEL allows you to send messages to your doctor, view your test results, renew your prescriptions, schedule appointments, view visit notes, and more. How Do I Sign Up? 1. In your Internet browser, go to [email or web address]  2. Click on the Sign Up Now link in the Sign In box. You will see the New Member Sign Up page. 3. Enter your VONTRAVEL Access Code exactly as it appears below. You will not need to use this code after youve completed the sign-up process.  If you do not sign up before the expiration date, you must request a new code. Haload Access Code: [email or web address]  Expires: 9/10/2017  4:33 PM    4. Enter your Social Security Number ([id number]) and Date of Birth (mm/dd/yyyy) as indicated and click Submit. You will be taken to the next sign-up page. 5. Create a Haload ID. This will be your Haload login ID and cannot be changed, so think of one that is secure and easy to remember. 6. Create a Haload password. You can change your password at any time. 7. Enter your Password Reset Question and Answer. This can be used at a later time if you forget your password. 8. Enter your e-mail address. You will receive e-mail notification when new information is available in [address] 9. Click Sign Up. You can now view your medical record. Additional Information  If you have questions, please contact the physician practice where you receive care. Remember, Haload is NOT to be used for urgent needs. For medical emergencies, dial 911. For questions regarding your Haload account call [phone]. If you have a clinical question, please call your doctor's office. View your information online  ? Review your current list of  medications, immunization, and allergies. ? Review your future test results online . ? Review your discharge instructions provided by your caregivers at discharge    Certain functionality such as prescription refills, scheduling appointments or sending messages to your provider are not activated if your provider does not use Pict in his/her office    For questions regarding your Haload account call [phone]. If you have a clinical question, please call your doctor's office. The information on all pages of the After Visit Summary has been reviewed with me, the patient and/or responsible adult, by my health care provider(s).  I had the opportunity to ask questions regarding this information. I understand I should dispose of my armband safely at home to protect my health information. A complete copy of the After Visit Summary has been given to me, the patient and/or responsible adult.            Patient Signature/Responsible Adult:____________________    Clinician Signature:_____________________    Date:_____________________    Time:_____________________

## 2017-08-21 NOTE — PLAN OF CARE
Problem: Pain Control  Intervention: IM/PO/IV pain medication  Patients pain medication addressed today with Dr. Hawk Guzman, patient verbalizes understanding and will contact if medication change dose not improve pain    Goal: Maintain pain level at or below patients acceptable level (or 5 if patient is unable to determine acceptable level)  Outcome: Met This Shift    Problem: Intellectual/Education/Knowledge Deficit  Intervention: Verbal/written education provided  Chemotherapy Teaching      What is Chemotherapy   Drug action [X]   Method of Administration [X]   Handouts given [ ]      Side Effects  Nausea/vomiting [X]   Diarrhea [X]   Fatigue [X]   Signs / Symptoms of infection [X]   Neutropenia [X]   Thrombocytopenia [X]   Alopecia [X]   neuropathy [X]   [place] diet &  the importance of fluids [X]         Micellaneous  Importance of nutrition [X]   Importance of oral hygiene [X]   When to call the MD [X]   Monitoring labs [X]   Use of supportive services [ ]      Explanation of Drug Regimen / Frequency  Cisplatin Cycle #1 today, every 3 weeks with radiation      Comments  Verbalized understanding to drug,action,side effects and when to call MD      Patient also given initial chemotherapy teaching today, verbalizes understanding of potential side effects and when to call doctor    Goal: Teaching initiated upon admission  Outcome: Met This Shift    Problem: Discharge Planning  Intervention: Interaction with patient/family and care team  Patient verbalizes understanding of discharge instructions, follow up appointment, and when to call physician if needed    Goal: Knowledge of discharge instructions  Knowledge of discharge instructions  Outcome: Met This Shift    Comments:   Care plan reviewed with patient. Patient verbalizes understanding of the plan of care and contributes to goal setting.

## 2017-08-21 NOTE — PROGRESS NOTES
Patient assessed for the following post chemotherapy:    Dizziness   No  Lightheadedness  No      Acute nausea/vomiting No  Headache   No  Chest pain/pressure  No  Rash/itching   No  Shortness of breath  No    Patient kept for 20 minutes observation post infusion chemotherapy. Patient tolerated chemotherapy treatment Cisplatin without any complications. Last vital signs:   /63  Pulse 98  Temp 97.8 °F (36.6 °C) (Oral)   Resp 20  Ht 5' 10\" (1.778 m)  Wt 123 lb 12.8 oz (56.2 kg)  SpO2 98%  BMI 17.76 kg/m2    Patient instructed if he experiences any of the above symptoms following today's infusion,he is to notify MD immediately or go to the emergency department. Discharge instructions given to patient. Verbalizes understanding. Ambulated off unit with family and belongings.

## 2017-08-21 NOTE — PROGRESS NOTES
Patient and family instructed on chemotherapy specifically the drugs Cisplatin and chemotherapy regimen. The [employer] [facility] folder along with the following - chemotherapy drug information sheets,chemotherapy side effects handouts,understanding your blood counts, [place] diet, Importance to hydration,fatigue booklet,when to call physician sheet,chemotherapy glossary terms,reduce risk infection sheet,bleeding precaution,neutropenia precaution,Chemotherapy and You was included and reviewed. All questions answered satisfactory and support given. Distress Screening Survey completed. Patient navigator explained to patient and informed that she may be calling him if needs assistance. Approximately 30 minutes spent with patient and family.

## 2017-08-21 NOTE — PROGRESS NOTES
Returned to [facility] area from [address] [place] [address], unable to receive radiation today due to machine not working.

## 2017-08-22 ENCOUNTER — TELEPHONE (OUTPATIENT)
Dept: ONCOLOGY | Age: 47
End: 2017-08-22

## 2017-08-22 PROCEDURE — 77386 HC NTSTY MODUL RAD TX DLVR CPLX: CPT | Performed by: RADIOLOGY

## 2017-08-22 RX ORDER — ONDANSETRON 4 MG/1
TABLET, ORALLY DISINTEGRATING ORAL
Qty: 30 TABLET | Refills: 1 | Status: SHIPPED | OUTPATIENT
Start: 2017-08-22

## 2017-08-24 PROCEDURE — 77386 HC NTSTY MODUL RAD TX DLVR CPLX: CPT | Performed by: RADIOLOGY

## 2017-08-25 PROCEDURE — 77386 HC NTSTY MODUL RAD TX DLVR CPLX: CPT | Performed by: RADIOLOGY

## 2017-08-28 ENCOUNTER — OFFICE VISIT (OUTPATIENT)
Dept: ONCOLOGY | Age: 47
End: 2017-08-28
Payer: MEDICARE

## 2017-08-28 ENCOUNTER — HOSPITAL ENCOUNTER (OUTPATIENT)
Dept: INFUSION THERAPY | Age: 47
Discharge: HOME | End: 2017-08-28
Payer: MEDICARE

## 2017-08-28 ENCOUNTER — HOSPITAL ENCOUNTER (OUTPATIENT)
Dept: RADIATION ONCOLOGY | Age: 47
Discharge: HOME | End: 2017-08-28
Payer: MEDICARE

## 2017-08-28 VITALS
HEIGHT: 70 IN | TEMPERATURE: 99.1 F | DIASTOLIC BLOOD PRESSURE: 65 MMHG | BODY MASS INDEX: 17.47 KG/M2 | OXYGEN SATURATION: 95 % | WEIGHT: 122 LBS | HEART RATE: 111 BPM | RESPIRATION RATE: 20 BRPM | SYSTOLIC BLOOD PRESSURE: 101 MMHG

## 2017-08-28 VITALS
WEIGHT: 122.4 LBS | HEIGHT: 70 IN | SYSTOLIC BLOOD PRESSURE: 97 MMHG | DIASTOLIC BLOOD PRESSURE: 70 MMHG | OXYGEN SATURATION: 97 % | HEART RATE: 124 BPM | BODY MASS INDEX: 17.52 KG/M2 | RESPIRATION RATE: 20 BRPM | TEMPERATURE: 100.3 F

## 2017-08-28 DIAGNOSIS — F17.219 CIGARETTE NICOTINE DEPENDENCE WITH NICOTINE-INDUCED DISORDER: ICD-10-CM

## 2017-08-28 DIAGNOSIS — R04.2 COUGH WITH HEMOPTYSIS: ICD-10-CM

## 2017-08-28 DIAGNOSIS — Z51.11 CHEMOTHERAPY MANAGEMENT, ENCOUNTER FOR: ICD-10-CM

## 2017-08-28 DIAGNOSIS — D66 FACTOR VIII DEFICIENCY (HCC): ICD-10-CM

## 2017-08-28 DIAGNOSIS — F17.200 SMOKER: ICD-10-CM

## 2017-08-28 DIAGNOSIS — F10.930 ALCOHOL WITHDRAWAL SYNDROME WITHOUT COMPLICATION (HCC): ICD-10-CM

## 2017-08-28 DIAGNOSIS — D62 ANEMIA ASSOCIATED WITH ACUTE BLOOD LOSS: ICD-10-CM

## 2017-08-28 DIAGNOSIS — R91.8 PULMONARY INFILTRATES: ICD-10-CM

## 2017-08-28 DIAGNOSIS — C76.0 RECENT DIAGNOSIS OF HEAD AND NECK CANCER (HCC): ICD-10-CM

## 2017-08-28 DIAGNOSIS — F10.11 HISTORY OF ALCOHOL ABUSE: ICD-10-CM

## 2017-08-28 DIAGNOSIS — J44.9 CHRONIC OBSTRUCTIVE PULMONARY DISEASE, UNSPECIFIED COPD TYPE (HCC): ICD-10-CM

## 2017-08-28 DIAGNOSIS — C76.0 RECENT DIAGNOSIS OF HEAD AND NECK CANCER (HCC): Primary | ICD-10-CM

## 2017-08-28 LAB
BASINOPHIL, AUTOMATED: 1 % (ref 0–12)
BUN, WHOLE BLOOD: 11 MG/DL (ref 8–26)
CHLORIDE, WHOLE BLOOD: 95 MEQ/L (ref 98–109)
CREATININE, WHOLE BLOOD: 0.6 MG/DL (ref 0.5–1.2)
EOSINOPHILS RELATIVE PERCENT: 2 % (ref 0–12)
GFR, ESTIMATED: > 90 ML/MIN/1.73M2
GLUCOSE, WHOLE BLOOD: 127 MG/DL (ref 70–108)
HCT VFR BLD CALC: 37.2 % (ref 42–52)
HEMOGLOBIN: 12.4 GM/DL (ref 14–18)
IONIZED CALCIUM, WHOLE BLOOD: 1.23 MMOL/L (ref 1.12–1.32)
LYMPHOCYTES # BLD: 15 % (ref 15–47)
MCH RBC QN AUTO: 32.4 PG (ref 27–31)
MCHC RBC AUTO-ENTMCNC: 33.3 GM/DL (ref 33–37)
MCV RBC AUTO: 97 FL (ref 80–94)
MONOCYTES: 8 % (ref 0–12)
PDW BLD-RTO: 10.7 % (ref 11.5–14.5)
PLATELET # BLD: 550 THOU/MM3 (ref 130–400)
PMV BLD AUTO: 7.3 MCM (ref 7.4–10.4)
POTASSIUM, WHOLE BLOOD: 3.3 MEQ/L (ref 3.5–4.9)
RBC # BLD: 3.82 MILL/MM3 (ref 4.7–6.1)
SEG NEUTROPHILS: 76 % (ref 43–75)
SODIUM, WHOLE BLOOD: 135 MEQ/L (ref 138–146)
TOTAL CO2, WHOLE BLOOD: 27 MEQ/L (ref 23–33)
WBC # BLD: 11.2 THOU/MM3 (ref 4.8–10.8)

## 2017-08-28 PROCEDURE — 2580000003 HC RX 258: Performed by: INTERNAL MEDICINE

## 2017-08-28 PROCEDURE — 36415 COLL VENOUS BLD VENIPUNCTURE: CPT

## 2017-08-28 PROCEDURE — G0463 HOSPITAL OUTPT CLINIC VISIT: HCPCS

## 2017-08-28 PROCEDURE — 80047 BASIC METABLC PNL IONIZED CA: CPT

## 2017-08-28 PROCEDURE — 99214 OFFICE O/P EST MOD 30 MIN: CPT | Performed by: INTERNAL MEDICINE

## 2017-08-28 PROCEDURE — 77386 HC NTSTY MODUL RAD TX DLVR CPLX: CPT | Performed by: RADIOLOGY

## 2017-08-28 PROCEDURE — 85025 COMPLETE CBC W/AUTO DIFF WBC: CPT

## 2017-08-28 PROCEDURE — 96360 HYDRATION IV INFUSION INIT: CPT

## 2017-08-28 PROCEDURE — 77336 RADIATION PHYSICS CONSULT: CPT | Performed by: RADIOLOGY

## 2017-08-28 RX ORDER — 0.9 % SODIUM CHLORIDE 0.9 %
1000 INTRAVENOUS SOLUTION INTRAVENOUS ONCE
Status: CANCELLED | OUTPATIENT
Start: 2017-08-28 | End: 2017-08-28

## 2017-08-28 RX ORDER — SODIUM CHLORIDE 0.9 % (FLUSH) 0.9 %
10 SYRINGE (ML) INJECTION PRN
Status: CANCELLED | OUTPATIENT
Start: 2017-08-28

## 2017-08-28 RX ORDER — SODIUM CHLORIDE 0.9 % (FLUSH) 0.9 %
5 SYRINGE (ML) INJECTION PRN
Status: CANCELLED | OUTPATIENT
Start: 2017-08-28

## 2017-08-28 RX ORDER — HEPARIN SODIUM (PORCINE) LOCK FLUSH IV SOLN 100 UNIT/ML 100 UNIT/ML
500 SOLUTION INTRAVENOUS PRN
Status: CANCELLED | OUTPATIENT
Start: 2017-08-28

## 2017-08-28 RX ORDER — CEPHALEXIN 500 MG/1
500 CAPSULE ORAL 3 TIMES DAILY
Qty: 30 CAPSULE | Refills: 0 | Status: SHIPPED | OUTPATIENT
Start: 2017-08-28 | End: 2017-10-03 | Stop reason: CLARIF

## 2017-08-28 RX ORDER — 0.9 % SODIUM CHLORIDE 0.9 %
500 INTRAVENOUS SOLUTION INTRAVENOUS ONCE
Status: CANCELLED | OUTPATIENT
Start: 2017-08-28 | End: 2017-08-28

## 2017-08-28 RX ORDER — SODIUM CHLORIDE 0.9 % (FLUSH) 0.9 %
20 SYRINGE (ML) INJECTION PRN
Status: CANCELLED | OUTPATIENT
Start: 2017-08-28

## 2017-08-28 RX ORDER — 0.9 % SODIUM CHLORIDE 0.9 %
500 INTRAVENOUS SOLUTION INTRAVENOUS ONCE
Status: COMPLETED | OUTPATIENT
Start: 2017-08-28 | End: 2017-08-28

## 2017-08-28 RX ADMIN — SODIUM CHLORIDE 500 ML: 9 INJECTION, SOLUTION INTRAVENOUS at 14:27

## 2017-08-28 ASSESSMENT — ENCOUNTER SYMPTOMS
NAUSEA: 0
TROUBLE SWALLOWING: 1
RECTAL PAIN: 0
CONSTIPATION: 1
VOMITING: 0
BLOOD IN STOOL: 0
BACK PAIN: 0
VOICE CHANGE: 1
COUGH: 1
SORE THROAT: 1
FACIAL SWELLING: 0
ABDOMINAL DISTENTION: 0
COLOR CHANGE: 0
SHORTNESS OF BREATH: 1
WHEEZING: 0
DIARRHEA: 0
ABDOMINAL PAIN: 0
CHEST TIGHTNESS: 1
EYE DISCHARGE: 0

## 2017-08-28 ASSESSMENT — PAIN SCALES - GENERAL
PAINLEVEL_OUTOF10: 7
PAINLEVEL_OUTOF10: 9

## 2017-08-28 ASSESSMENT — PAIN DESCRIPTION - LOCATION
LOCATION: HEAD;NECK
LOCATION: HEAD;NECK

## 2017-08-28 ASSESSMENT — PAIN DESCRIPTION - FREQUENCY
FREQUENCY: INTERMITTENT
FREQUENCY: INTERMITTENT

## 2017-08-28 ASSESSMENT — PAIN DESCRIPTION - PAIN TYPE
TYPE: CHRONIC PAIN
TYPE: CHRONIC PAIN

## 2017-08-28 ASSESSMENT — PAIN DESCRIPTION - DESCRIPTORS
DESCRIPTORS: ACHING
DESCRIPTORS: ACHING

## 2017-08-28 NOTE — IP AVS SNAPSHOT
After Visit Summary  (Discharge Instructions)    Medication List for Home    Based on the information you provided to us as well as any changes during this visit, the following is your updated medication list.  Compare this with your prescription bottles at home. If you have any questions or concerns, contact your primary care physician's office. Daily Medication List (This medication list can be shared with any healthcare provider who is helping you manage your medications)      ASK your doctor about these medications if you have questions        Last Dose    Next Dose Due AM NOON PM NIGHT    albuterol sulfate  (90 Base) MCG/ACT inhaler   Commonly known as:  PROVENTIL HFA   Inhale 2 puffs into the lungs every 4 hours as needed for Wheezing or Shortness of Breath (Space out to every 6 hours as symptoms improve) Space out to every 6 hours as symptoms improve. cephALEXin 500 MG capsule   Commonly known as:  KEFLEX   Take 1 capsule by mouth 3 times daily                                         fentaNYL 25 MCG/HR   Commonly known as:  [address] 1 patch onto the skin every 72 hours  Epiglottic CANCER. fentaNYL 50 MCG/HR   Commonly known as:  [address] 1 patch onto the skin every 72 hours . fentaNYL 600 MCG Liqd   Commonly known as:  SUBSYS   Place 600 mcg under the tongue every 6 hours as needed for Pain .                                          Handicap Placard Misc   by Does not apply route Issued disability placard from August 21, 2017 till February 2018                                         omeprazole 2 MG/ML Susp 2 mg/mL oral suspension   Commonly known as:  PRILOSEC   Take 20 mLs by mouth Daily                                         ondansetron 4 MG disintegrating tablet   Commonly known as:  ZOFRAN ODT   Place 1 tablet under your tongue every 8 hours ondansetron 4 MG tablet   Commonly known as:  ZOFRAN   Take 1 tablet by mouth every 8 hours as needed (For nausea)                                         potassium chloride 20 MEQ/15ML (10%) oral solution   TAKE 15 MLS BY MOUTH TWICE DAILY FOR 10 DAYS                                         promethazine 6.25 MG/5ML syrup   Commonly known as:  PHENERGAN   5 mLs by Per G Tube route every 6 hours as needed for Nausea                                              Where to Get Your Medications      These medications were sent to [facility]  [place], [place] - [address] [address] [address] - P [phone] - F [phone]  . [address] [address]    Hours:  24-hours Phone:  [phone]     cephALEXin 500 MG capsule         You can get these medications from any pharmacy     Bring a paper prescription for each of these medications     omeprazole 2 MG/ML Susp 2 mg/mL oral suspension               Allergies as of 8/28/2017     No Known Allergies      Immunizations as of 8/28/2017     No immunizations on file. Last Vitals          Most Recent Value    Temp  99.4 °F (37.4 °C)    Pulse  101    Resp  20    BP  115/70         After Visit Summary    This summary was created for you. Thank you for entrusting your care to us. The following information includes details about your hospital/visit stay along with steps you should take to help with your recovery once you leave the hospital.  In this packet, you will find information about the topics listed below:    · Instructions about your medications including a list of your home medications  · A summary of your hospital visit  · Follow-up appointments once you have left the hospital  · Your care plan at home      You may receive a survey regarding the care you received during your stay. Your input is valuable to us. We encourage you to complete and return your survey in the envelope provided. We hope you will choose us in the future for your healthcare needs. Patient Information     Patient Name MEE Noland Running 1970      Care Provided at:     Name Address Phone       [address] [facility] [address] [phone]            Your Visit    Here you will find information about your visit, including the reason for your visit. Please take this sheet with you when you visit your doctor or other health care provider in the future. It will help determine the best possible medical care for you at that time. If you have any questions once you leave the hospital, please call the department phone number listed below. Why you were here     Your primary diagnosis was:  Not on File      Visit Information     Date & Time Department Dept. Phone    2017 [facility] [phone]       Follow-up Appointments    Below is a list of your follow-up and future appointments. This may not be a complete list as you may have made appointments directly with providers that we are not aware of or your providers may have made some for you. Please call your providers to confirm appointments. It is important to keep your appointments. Please bring your current insurance card, photo ID, co-pay, and all medication bottles to your appointment. If self-pay, payment is expected at the time of service. Future Appointments     2017 1:30 PM     Appointment with [facility] [place] [address] at [address] [place] [address] ([phone], [address]  [facility] 83       2017 11:50 AM     Appointment with Hector Ladd CNP at [address] ([phone])   Please arrive 15 minutes prior to appointment, bring photo ID and insurance card. [address] [facility] [address]  [facility] [address]       2017 1:30 PM     Appointment with STR OUT PT [facility] 17 at [address] [place] [address] ([phone])   [address] [facility], [address] [address]       9/4/2017 9:30 AM     Appointment with STR OUT PT ONC BED 1 at [address] [facility] ([phone])   [address] [facility], [address]  [facility] 83       9/6/2017 9:30 AM     Appointment with Sherri Davis MD at [facility] ([phone])   Please arrive 15 minutes prior to appointment, bring photo ID and insurance card. Please arrive 15 minutes prior to appointment, bring photo ID and insurance card. [address] [facility]       9/6/2017 10:30 AM     Appointment with STR OUT PT ONC INJ RM 16 at [address] [facility] ([phone])   [address] [facility], [address]  [facility] 83         Preventive Care        Date Due    HIV screening is recommended for all people regardless of risk factors  aged 15-65 years at least once (lifetime) who have never been HIV tested. 12/14/1986    Tetanus Combination Vaccine (1 - Tdap) 12/14/1990    Pneumococcal Vaccine - Pneumovax for adults aged 19-64 years with: chronic heart disease, chronic lung disease, diabetes mellitus, alcoholism, chronic liver disease, or cigarette smoking. (1 of 1 - PPSV23) 12/14/1990    Cholesterol Screening 12/14/2011    Yearly Flu Vaccine (1) 8/1/2017                 Care Plan Once You Return Home    This section includes instructions you will need to follow once you leave the hospital.  Your care team will discuss these with you, so you and those caring for you know how to best care for your health needs at home. This section may also include educational information about certain health topics that may be of help to you. Discharge Instructions       Please contact your Oncologist if you have any questions regarding the hydration that you received today. Patient instructed if experience any of the symptoms following today's hydration / to notify MD immediately or go to emergency department.     * dizziness/lightheadedness  *acute nausea/vomiting - not relieved with medication *headache - not relieved from Tylenol/pain medication  *chest pain/pressure  *rash/itching  *shortness of breath        Drink fluids - 48oz fluids daily  Call if develop fever/ chills/ signs or symptoms of infection    Important information for a smoker       SMOKING: QUIT SMOKING. THIS IS THE MOST IMPORTANT ACTION YOU CAN TAKE TO IMPROVE YOUR CURRENT AND FUTURE HEALTH. Call the [facility] [place] NOW ([phone])    Smoking harms nonsmokers. When nonsmokers are around people who smoke, they absorb nicotine, carbon monoxide, and other ingredients of tobacco smoke. DO NOT SMOKE AROUND CHILDREN     Children exposed to secondhand smoke are at an increased risk of:  Sudden Infant Death Syndrome (SIDS), acute respiratory infections, inflammation of the middle ear, and severe asthma. Over a longer time, it causes heart disease and lung cancer. There is no safe level of exposure to secondhand smoke. Cherrishhart Signup     AiMeiWei allows you to send messages to your doctor, view your test results, renew your prescriptions, schedule appointments, view visit notes, and more. How Do I Sign Up? 1. In your Internet browser, go to [email or web address]  2. Click on the Sign Up Now link in the Sign In box. You will see the New Member Sign Up page. 3. Enter your AiMeiWei Access Code exactly as it appears below. You will not need to use this code after youve completed the sign-up process. If you do not sign up before the expiration date, you must request a new code. AiMeiWei Access Code: [email or web address]  Expires: 9/10/2017  4:33 PM    4. Enter your Social Security Number ([id number]) and Date of Birth (mm/dd/yyyy) as indicated and click Submit. You will be taken to the next sign-up page. 5. Create a AiMeiWei ID. This will be your AiMeiWei login ID and cannot be changed, so think of one that is secure and easy to remember. 6. Create a Orbiter password. You can change your password at any time. 7. Enter your Password Reset Question and Answer. This can be used at a later time if you forget your password. 8. Enter your e-mail address. You will receive e-mail notification when new information is available in [address] 9. Click Sign Up. You can now view your medical record. Additional Information  If you have questions, please contact the physician practice where you receive care. Remember, [employer] is NOT to be used for urgent needs. For medical emergencies, dial 911. For questions regarding your AngioScorehart account call [phone]. If you have a clinical question, please call your doctor's office. View your information online  ? Review your current list of  medications, immunization, and allergies. ? Review your future test results online . ? Review your discharge instructions provided by your caregivers at discharge    Certain functionality such as prescription refills, scheduling appointments or sending messages to your provider are not activated if your provider does not use Jenn Rykert in his/her office    For questions regarding your AngioScorehart account call [phone]. If you have a clinical question, please call your doctor's office. The information on all pages of the After Visit Summary has been reviewed with me, the patient and/or responsible adult, by my health care provider(s). I had the opportunity to ask questions regarding this information. I understand I should dispose of my armband safely at home to protect my health information. A complete copy of the After Visit Summary has been given to me, the patient and/or responsible adult.            Patient Signature/Responsible Adult:____________________    Clinician Signature:_____________________    Date:_____________________    Time:_____________________

## 2017-08-28 NOTE — IP AVS SNAPSHOT
Patient Information     Patient Name MEE Cheney 1970         This is your updated medication list to keep with you all times      ASK your doctor about these medications     albuterol sulfate  (90 Base) MCG/ACT inhaler   Commonly known as:  PROVENTIL HFA   Inhale 2 puffs into the lungs every 4 hours as needed for Wheezing or Shortness of Breath (Space out to every 6 hours as symptoms improve) Space out to every 6 hours as symptoms improve. cephALEXin 500 MG capsule   Commonly known as:  KEFLEX   Take 1 capsule by mouth 3 times daily       * fentaNYL 25 MCG/HR   Commonly known as:  [address] 1 patch onto the skin every 72 hours  Epiglottic CANCER. * fentaNYL 50 MCG/HR   Commonly known as:  [address] 1 patch onto the skin every 72 hours . * fentaNYL 600 MCG Liqd   Commonly known as:  SUBSYS   Place 600 mcg under the tongue every 6 hours as needed for Pain . Handicap Placard Misc   by Does not apply route Issued disability placard from 2017 till 2018       omeprazole 2 MG/ML Susp 2 mg/mL oral suspension   Commonly known as:  PRILOSEC   Take 20 mLs by mouth Daily       ondansetron 4 MG disintegrating tablet   Commonly known as:  ZOFRAN ODT   Place 1 tablet under your tongue every 8 hours       ondansetron 4 MG tablet   Commonly known as:  ZOFRAN   Take 1 tablet by mouth every 8 hours as needed (For nausea)       potassium chloride 20 MEQ/15ML (10%) oral solution       promethazine 6.25 MG/5ML syrup   Commonly known as:  PHENERGAN   5 mLs by Per G Tube route every 6 hours as needed for Nausea       * Notice: This list has 3 medication(s) that are the same as other medications prescribed for you. Read the directions carefully, and ask your doctor or other care provider to review them with you.

## 2017-08-28 NOTE — PROGRESS NOTES
Patient assessed for the following post hydration:    Dizziness   No  Lightheadedness  No     Acute nausea/vomiting No  Headache   No  Chest pain/pressure  No  Rash/itching   No  Shortness of breath  No    Patient tolerated . 9NS 500ml infusion over 1 hour without any complications. Last vital signs:   /65  Pulse 111  Temp 99.1 °F (37.3 °C) (Oral)   Resp 20  SpO2 95%      Patient instructed if experience any of the above symptoms following today's infusion,he/she is to notify MD immediately or go to the emergency department. Discharge instructions given to patient. Verbalizes understanding. Ambulated off unit per wheelchair with family with belongings.

## 2017-08-28 NOTE — PLAN OF CARE
Problem: Pain:  Intervention: Promote participation in pain management plan  Patient encouraged to take pain med as prescribed and to call the physician if having uncontrolled pain. Goal: Control of chronic pain  Control of chronic pain   Outcome: Met This Shift  Patient rates pain @ \"9\" upon admission and rates pain \"7\" upon discharge. Patient took own pain med @ 3pm.    Problem: Intellectual/Education/Knowledge Deficit  Intervention: Verbal/written education provided  Patient verbalized understanding of signs and symptoms of dehydration. Patient received . 9NS 500ml infusing over 1hour. Patient did not take po liquids today. Void xl noted. Goal: Teaching initiated upon admission  Outcome: Met This Shift  Patient verbalizes understanding to verbal information given on . 9NS 500ml,action and possible side effects. Aware to call MD if develop complications. Problem: Discharge Planning  Intervention: Interaction with patient/family and care team  Provide discharge instructions. Goal: Knowledge of discharge instructions  Knowledge of discharge instructions   Outcome: Met This Shift  Verbalized understanding of discharge instructions, follow-up appointments, and when to call the physician. Comments:   Care plan reviewed with patient and family. Patient and family verbalize understanding of the plan of care and contribute to goal setting.

## 2017-08-29 ENCOUNTER — TELEPHONE (OUTPATIENT)
Dept: PHYSICAL MEDICINE AND REHAB | Age: 47
End: 2017-08-29

## 2017-08-29 RX ORDER — FENTANYL 25 UG/H
1 PATCH TRANSDERMAL
Qty: 5 PATCH | Refills: 0 | Status: SHIPPED | OUTPATIENT
Start: 2017-08-29 | End: 2017-09-06 | Stop reason: SDUPTHER

## 2017-08-30 ENCOUNTER — CLINICAL DOCUMENTATION (OUTPATIENT)
Dept: NUTRITION | Age: 47
End: 2017-08-30

## 2017-08-30 ENCOUNTER — HOSPITAL ENCOUNTER (OUTPATIENT)
Dept: INFUSION THERAPY | Age: 47
Discharge: HOME | End: 2017-08-30
Payer: MEDICARE

## 2017-08-30 VITALS
RESPIRATION RATE: 20 BRPM | TEMPERATURE: 98.7 F | WEIGHT: 123.2 LBS | HEART RATE: 100 BPM | HEIGHT: 70 IN | OXYGEN SATURATION: 97 % | DIASTOLIC BLOOD PRESSURE: 67 MMHG | BODY MASS INDEX: 17.64 KG/M2 | SYSTOLIC BLOOD PRESSURE: 107 MMHG

## 2017-08-30 DIAGNOSIS — R91.8 PULMONARY INFILTRATES: ICD-10-CM

## 2017-08-30 DIAGNOSIS — F10.930 ALCOHOL WITHDRAWAL SYNDROME WITHOUT COMPLICATION (HCC): ICD-10-CM

## 2017-08-30 DIAGNOSIS — F17.219 CIGARETTE NICOTINE DEPENDENCE WITH NICOTINE-INDUCED DISORDER: ICD-10-CM

## 2017-08-30 DIAGNOSIS — C76.0 RECENT DIAGNOSIS OF HEAD AND NECK CANCER (HCC): ICD-10-CM

## 2017-08-30 DIAGNOSIS — J44.9 CHRONIC OBSTRUCTIVE PULMONARY DISEASE, UNSPECIFIED COPD TYPE (HCC): ICD-10-CM

## 2017-08-30 DIAGNOSIS — F17.200 SMOKER: ICD-10-CM

## 2017-08-30 DIAGNOSIS — D66 FACTOR VIII DEFICIENCY (HCC): ICD-10-CM

## 2017-08-30 DIAGNOSIS — F10.11 HISTORY OF ALCOHOL ABUSE: ICD-10-CM

## 2017-08-30 DIAGNOSIS — D62 ANEMIA ASSOCIATED WITH ACUTE BLOOD LOSS: ICD-10-CM

## 2017-08-30 DIAGNOSIS — R04.2 COUGH WITH HEMOPTYSIS: ICD-10-CM

## 2017-08-30 PROCEDURE — 2580000003 HC RX 258: Performed by: INTERNAL MEDICINE

## 2017-08-30 PROCEDURE — 96360 HYDRATION IV INFUSION INIT: CPT

## 2017-08-30 RX ORDER — SODIUM CHLORIDE 0.9 % (FLUSH) 0.9 %
20 SYRINGE (ML) INJECTION PRN
Status: CANCELLED | OUTPATIENT
Start: 2017-08-30

## 2017-08-30 RX ORDER — SODIUM CHLORIDE 0.9 % (FLUSH) 0.9 %
10 SYRINGE (ML) INJECTION PRN
Status: CANCELLED | OUTPATIENT
Start: 2017-08-30

## 2017-08-30 RX ORDER — 0.9 % SODIUM CHLORIDE 0.9 %
500 INTRAVENOUS SOLUTION INTRAVENOUS ONCE
Status: CANCELLED | OUTPATIENT
Start: 2017-08-30 | End: 2017-08-30

## 2017-08-30 RX ORDER — HEPARIN SODIUM (PORCINE) LOCK FLUSH IV SOLN 100 UNIT/ML 100 UNIT/ML
500 SOLUTION INTRAVENOUS PRN
Status: CANCELLED | OUTPATIENT
Start: 2017-08-30

## 2017-08-30 RX ORDER — SODIUM CHLORIDE 0.9 % (FLUSH) 0.9 %
5 SYRINGE (ML) INJECTION PRN
Status: CANCELLED | OUTPATIENT
Start: 2017-08-30

## 2017-08-30 RX ORDER — 0.9 % SODIUM CHLORIDE 0.9 %
500 INTRAVENOUS SOLUTION INTRAVENOUS ONCE
Status: COMPLETED | OUTPATIENT
Start: 2017-08-30 | End: 2017-08-30

## 2017-08-30 RX ADMIN — SODIUM CHLORIDE 500 ML: 9 INJECTION, SOLUTION INTRAVENOUS at 14:55

## 2017-08-30 ASSESSMENT — PAIN DESCRIPTION - LOCATION
LOCATION: MOUTH
LOCATION: THROAT

## 2017-08-30 ASSESSMENT — PAIN DESCRIPTION - DESCRIPTORS
DESCRIPTORS: ACHING
DESCRIPTORS: ACHING

## 2017-08-30 ASSESSMENT — PAIN DESCRIPTION - FREQUENCY
FREQUENCY: INTERMITTENT
FREQUENCY: INTERMITTENT

## 2017-08-30 ASSESSMENT — PAIN DESCRIPTION - PAIN TYPE
TYPE: ACUTE PAIN
TYPE: ACUTE PAIN

## 2017-08-30 ASSESSMENT — PAIN SCALES - GENERAL
PAINLEVEL_OUTOF10: 7
PAINLEVEL_OUTOF10: 6

## 2017-08-30 NOTE — IP AVS SNAPSHOT
Patient Information     Patient Name DOB Claudette Erb 1970         This is your updated medication list to keep with you all times      ASK your doctor about these medications     albuterol sulfate  (90 Base) MCG/ACT inhaler   Commonly known as:  PROVENTIL HFA   Inhale 2 puffs into the lungs every 4 hours as needed for Wheezing or Shortness of Breath (Space out to every 6 hours as symptoms improve) Space out to every 6 hours as symptoms improve. cephALEXin 500 MG capsule   Commonly known as:  KEFLEX   Take 1 capsule by mouth 3 times daily       * fentaNYL 25 MCG/HR   Commonly known as:  [address] 1 patch onto the skin every 72 hours  Epiglottic CANCER. * fentaNYL 50 MCG/HR   Commonly known as:  [address] 1 patch onto the skin every 72 hours . * fentaNYL 600 MCG Liqd   Commonly known as:  SUBSYS   Place 600 mcg under the tongue every 6 hours as needed for Pain . * fentaNYL 25 MCG/HR   Commonly known as:  [address] 1 patch onto the skin every 72 hours for 15 days  In addition to the 50 mcg = 75 mcg. Cancer pain. Handicap Placard Misc   by Does not apply route Issued disability placard from 2017 till 2018       omeprazole 2 MG/ML Susp 2 mg/mL oral suspension   Commonly known as:  PRILOSEC   Take 20 mLs by mouth Daily       ondansetron 4 MG disintegrating tablet   Commonly known as:  ZOFRAN ODT   Place 1 tablet under your tongue every 8 hours       ondansetron 4 MG tablet   Commonly known as:  ZOFRAN   Take 1 tablet by mouth every 8 hours as needed (For nausea)       potassium chloride 20 MEQ/15ML (10%) oral solution       * Notice: This list has 4 medication(s) that are the same as other medications prescribed for you. Read the directions carefully, and ask your doctor or other care provider to review them with you.

## 2017-08-30 NOTE — PROGRESS NOTES
Patient assessed for the following post hydration:    Dizziness   No  Lightheadedness  No     Acute nausea/vomiting No  Headache   No  Chest pain/pressure  No  Rash/itching   No  Shortness of breath  No    Patient tolerated . 9NS 500ml infusion over 1hour without any complications. Last vital signs:   /67  Pulse 100  Temp 98.7 °F (37.1 °C) (Oral)   Resp 20  Ht 5' 10\" (1.778 m)  Wt 123 lb 3.2 oz (55.9 kg)  SpO2 97%  BMI 17.68 kg/m2      Patient instructed if experience any of the above symptoms following today's infusion,he/she is to notify MD immediately or go to the emergency department. Discharge instructions given to patient. Verbalizes understanding. Ambulated off unit per wheelchair with family with belongings.

## 2017-08-30 NOTE — PLAN OF CARE
Problem: Pain:  Intervention: Promote participation in pain management plan  Patient encouraged to take pain med as prescribed and to call the physician if pain is uncontrollable. duragesic patch increased today. Goal: Control of acute pain  Control of acute pain   Outcome: Met This Shift  Patient rates pain in throat and mouth @ \"7\" and \"9\" upon admission and discharge. Problem: Intellectual/Education/Knowledge Deficit  Intervention: Verbal/written education provided  Patient verbalized understanding of signs and symptoms of dehydration. Patient received . 9NS 500ml over 1hour. Patient given 8oz water per peg tube with feeding. No voids today. Goal: Teaching initiated upon admission  Outcome: Met This Shift  Patient verbalizes understanding to verbal information given on hydration and signs and symptoms of dehydration,action and possible side effects. Aware to call MD if develop complications. Problem: Discharge Planning  Intervention: Interaction with patient/family and care team  Provide discharge instructions. Goal: Knowledge of discharge instructions  Knowledge of discharge instructions   Outcome: Met This Shift  Verbalized understanding of discharge instructions, follow-up appointments, and when to call the physician. Comments:   Care plan reviewed with patient and family. Patient and family verbalize understanding of the plan of care and contribute to goal setting.

## 2017-08-30 NOTE — IP AVS SNAPSHOT
After Visit Summary  (Discharge Instructions)    Medication List for Home    Based on the information you provided to us as well as any changes during this visit, the following is your updated medication list.  Compare this with your prescription bottles at home. If you have any questions or concerns, contact your primary care physician's office. Daily Medication List (This medication list can be shared with any healthcare provider who is helping you manage your medications)      ASK your doctor about these medications if you have questions        Last Dose    Next Dose Due AM NOON PM NIGHT    albuterol sulfate  (90 Base) MCG/ACT inhaler   Commonly known as:  PROVENTIL HFA   Inhale 2 puffs into the lungs every 4 hours as needed for Wheezing or Shortness of Breath (Space out to every 6 hours as symptoms improve) Space out to every 6 hours as symptoms improve. cephALEXin 500 MG capsule   Commonly known as:  KEFLEX   Take 1 capsule by mouth 3 times daily                                         fentaNYL 25 MCG/HR   Commonly known as:  [address] 1 patch onto the skin every 72 hours  Epiglottic CANCER. fentaNYL 50 MCG/HR   Commonly known as:  [address] 1 patch onto the skin every 72 hours . fentaNYL 600 MCG Liqd   Commonly known as:  SUBSYS   Place 600 mcg under the tongue every 6 hours as needed for Pain . fentaNYL 25 MCG/HR   Commonly known as:  [address] 1 patch onto the skin every 72 hours for 15 days  In addition to the 50 mcg = 75 mcg. Cancer pain.                                          Handicap Placard Misc   by Does not apply route Issued disability placard from August 21, 2017 till February 2018                                         omeprazole 2 MG/ML Susp 2 mg/mL oral suspension your visit. Please take this sheet with you when you visit your doctor or other health care provider in the future. It will help determine the best possible medical care for you at that time. If you have any questions once you leave the hospital, please call the department phone number listed below. Why you were here     Your primary diagnosis was:  Not on File      Visit Information     Date & Time Department Dept. Phone    8/30/2017 [facility] [phone]       Follow-up Appointments    Below is a list of your follow-up and future appointments. This may not be a complete list as you may have made appointments directly with providers that we are not aware of or your providers may have made some for you. Please call your providers to confirm appointments. It is important to keep your appointments. Please bring your current insurance card, photo ID, co-pay, and all medication bottles to your appointment. If self-pay, payment is expected at the time of service. Future Appointments     8/31/2017 11:50 AM     Appointment with Alla Shore CNP at [address] ([phone])   Please arrive 15 minutes prior to appointment, bring photo ID and insurance card. [address]  [facility] [address]       9/1/2017  1:30 PM     Appointment with STR OUT PT ONC INJ RM 17 at [address] [facility] ([phone])   [address]  [place] [facility]       9/6/2017 9:30 AM     Appointment with Leticia Frye MD at [facility]. [facility] ([phone])   Please arrive 15 minutes prior to appointment, bring photo ID and insurance card. Please arrive 15 minutes prior to appointment, bring photo ID and insurance card.    [address] [facility]       9/6/2017 10:30 AM     Appointment with STR OUT PT ONC INJ RM 16 at [address] [facility] ([phone])   [address]  [employer] [facility]         Preventive Care        Date Due HIV screening is recommended for all people regardless of risk factors  aged 15-65 years at least once (lifetime) who have never been HIV tested. 12/14/1986    Tetanus Combination Vaccine (1 - Tdap) 12/14/1990    Pneumococcal Vaccine - Pneumovax for adults aged 19-64 years with: chronic heart disease, chronic lung disease, diabetes mellitus, alcoholism, chronic liver disease, or cigarette smoking. (1 of 1 - PPSV23) 12/14/1990    Cholesterol Screening 12/14/2011    Yearly Flu Vaccine (1) 8/1/2017                 Care Plan Once You Return Home    This section includes instructions you will need to follow once you leave the hospital.  Your care team will discuss these with you, so you and those caring for you know how to best care for your health needs at home. This section may also include educational information about certain health topics that may be of help to you. Discharge Instructions       Please contact your Oncologist if you have any questions regarding the hydration that you received today. Patient instructed if experience any of the symptoms following today's hydration / to notify MD immediately or go to emergency department. * dizziness/lightheadedness  *acute nausea/vomiting - not relieved with medication  *headache - not relieved from Tylenol/pain medication  *chest pain/pressure  *rash/itching  *shortness of breath        Drink fluids - 48oz fluids daily  Call if develop fever/ chills/ signs or symptoms of infection    Important information for a smoker       SMOKING: QUIT SMOKING. THIS IS THE MOST IMPORTANT ACTION YOU CAN TAKE TO IMPROVE YOUR CURRENT AND FUTURE HEALTH. Call the [address] [place] [address] at [place] [facility] ([phone])    Smoking harms nonsmokers. When nonsmokers are around people who smoke, they absorb nicotine, carbon monoxide, and other ingredients of tobacco smoke.      DO NOT SMOKE AROUND CHILDREN Children exposed to secondhand smoke are at an increased risk of:  Sudden Infant Death Syndrome (SIDS), acute respiratory infections, inflammation of the middle ear, and severe asthma. Over a longer time, it causes heart disease and lung cancer. There is no safe level of exposure to secondhand smoke. [employer] Signup     [employer]Go allows you to send messages to your doctor, view your test results, renew your prescriptions, schedule appointments, view visit notes, and more. How Do I Sign Up? 1. In your Internet browser, go to [email or web address]  2. Click on the Sign Up Now link in the Sign In box. You will see the New Member Sign Up page. 3. Enter your [employer]Go Access Code exactly as it appears below. You will not need to use this code after youve completed the sign-up process. If you do not sign up before the expiration date, you must request a new code. [employer] Access Code: [email or web address]  Expires: 9/10/2017  4:33 PM    4. Enter your Social Security Number ([id number]) and Date of Birth (mm/dd/yyyy) as indicated and click Submit. You will be taken to the next sign-up page. 5. Create a Tales2Go ID. This will be your Tales2Go login ID and cannot be changed, so think of one that is secure and easy to remember. 6. Create a Tales2Go password. You can change your password at any time. 7. Enter your Password Reset Question and Answer. This can be used at a later time if you forget your password. 8. Enter your e-mail address. You will receive e-mail notification when new information is available in [address]. 9. Click Sign Up. You can now view your medical record. Additional Information  If you have questions, please contact the physician practice where you receive care. Remember, [employer]2Go is NOT to be used for urgent needs. For medical emergencies, dial 911. For questions regarding your Tales2Go account call [phone].  If you have a clinical question, please call your doctor's office. View your information online  ? Review your current list of  medications, immunization, and allergies. ? Review your future test results online . ? Review your discharge instructions provided by your caregivers at discharge    Certain functionality such as prescription refills, scheduling appointments or sending messages to your provider are not activated if your provider does not use [employer] in his/her office    For questions regarding your MyChart account call [phone]. If you have a clinical question, please call your doctor's office. The information on all pages of the After Visit Summary has been reviewed with me, the patient and/or responsible adult, by my health care provider(s). I had the opportunity to ask questions regarding this information. I understand I should dispose of my armband safely at home to protect my health information. A complete copy of the After Visit Summary has been given to me, the patient and/or responsible adult.            Patient Signature/Responsible Adult:____________________    Clinician Signature:_____________________    Date:_____________________    Time:_____________________

## 2017-09-01 ENCOUNTER — HOSPITAL ENCOUNTER (OUTPATIENT)
Dept: INFUSION THERAPY | Age: 47
Discharge: HOME | End: 2017-09-01
Payer: MEDICARE

## 2017-09-01 VITALS
RESPIRATION RATE: 18 BRPM | DIASTOLIC BLOOD PRESSURE: 76 MMHG | HEART RATE: 106 BPM | SYSTOLIC BLOOD PRESSURE: 115 MMHG | WEIGHT: 123.2 LBS | BODY MASS INDEX: 17.64 KG/M2 | TEMPERATURE: 99.1 F | HEIGHT: 70 IN | OXYGEN SATURATION: 98 %

## 2017-09-01 DIAGNOSIS — D66 FACTOR VIII DEFICIENCY (HCC): ICD-10-CM

## 2017-09-01 DIAGNOSIS — J43.9 PULMONARY EMPHYSEMA, UNSPECIFIED EMPHYSEMA TYPE (HCC): ICD-10-CM

## 2017-09-01 DIAGNOSIS — F17.219 CIGARETTE NICOTINE DEPENDENCE WITH NICOTINE-INDUCED DISORDER: ICD-10-CM

## 2017-09-01 DIAGNOSIS — F10.11 HISTORY OF ALCOHOL ABUSE: ICD-10-CM

## 2017-09-01 DIAGNOSIS — F17.200 SMOKER: ICD-10-CM

## 2017-09-01 DIAGNOSIS — F10.930 ALCOHOL WITHDRAWAL SYNDROME WITHOUT COMPLICATION (HCC): ICD-10-CM

## 2017-09-01 DIAGNOSIS — R04.2 COUGH WITH HEMOPTYSIS: ICD-10-CM

## 2017-09-01 DIAGNOSIS — C76.0 RECENT DIAGNOSIS OF HEAD AND NECK CANCER (HCC): ICD-10-CM

## 2017-09-01 DIAGNOSIS — J44.9 CHRONIC OBSTRUCTIVE PULMONARY DISEASE, UNSPECIFIED COPD TYPE (HCC): ICD-10-CM

## 2017-09-01 DIAGNOSIS — D62 ANEMIA ASSOCIATED WITH ACUTE BLOOD LOSS: ICD-10-CM

## 2017-09-01 DIAGNOSIS — R91.8 PULMONARY INFILTRATES: ICD-10-CM

## 2017-09-01 PROCEDURE — 96360 HYDRATION IV INFUSION INIT: CPT

## 2017-09-01 PROCEDURE — 2580000003 HC RX 258: Performed by: INTERNAL MEDICINE

## 2017-09-01 RX ORDER — HEPARIN SODIUM (PORCINE) LOCK FLUSH IV SOLN 100 UNIT/ML 100 UNIT/ML
500 SOLUTION INTRAVENOUS PRN
Status: CANCELLED | OUTPATIENT
Start: 2017-09-01

## 2017-09-01 RX ORDER — 0.9 % SODIUM CHLORIDE 0.9 %
500 INTRAVENOUS SOLUTION INTRAVENOUS ONCE
Status: CANCELLED | OUTPATIENT
Start: 2017-09-01 | End: 2017-09-01

## 2017-09-01 RX ORDER — SODIUM CHLORIDE 0.9 % (FLUSH) 0.9 %
20 SYRINGE (ML) INJECTION PRN
Status: CANCELLED | OUTPATIENT
Start: 2017-09-01

## 2017-09-01 RX ORDER — SODIUM CHLORIDE 0.9 % (FLUSH) 0.9 %
10 SYRINGE (ML) INJECTION PRN
Status: CANCELLED | OUTPATIENT
Start: 2017-09-01

## 2017-09-01 RX ORDER — SODIUM CHLORIDE 0.9 % (FLUSH) 0.9 %
5 SYRINGE (ML) INJECTION PRN
Status: CANCELLED | OUTPATIENT
Start: 2017-09-01

## 2017-09-01 RX ORDER — 0.9 % SODIUM CHLORIDE 0.9 %
500 INTRAVENOUS SOLUTION INTRAVENOUS ONCE
Status: COMPLETED | OUTPATIENT
Start: 2017-09-01 | End: 2017-09-01

## 2017-09-01 RX ADMIN — SODIUM CHLORIDE 500 ML: 9 INJECTION, SOLUTION INTRAVENOUS at 14:39

## 2017-09-01 ASSESSMENT — PAIN DESCRIPTION - DESCRIPTORS
DESCRIPTORS: ACHING;BURNING
DESCRIPTORS: ACHING;BURNING

## 2017-09-01 ASSESSMENT — PAIN DESCRIPTION - PAIN TYPE
TYPE: ACUTE PAIN
TYPE: ACUTE PAIN

## 2017-09-01 ASSESSMENT — PAIN DESCRIPTION - FREQUENCY
FREQUENCY: CONTINUOUS
FREQUENCY: CONTINUOUS

## 2017-09-01 ASSESSMENT — PAIN DESCRIPTION - ONSET
ONSET: ON-GOING
ONSET: ON-GOING

## 2017-09-01 ASSESSMENT — PAIN SCALES - GENERAL
PAINLEVEL_OUTOF10: 2
PAINLEVEL_OUTOF10: 2

## 2017-09-01 ASSESSMENT — PAIN DESCRIPTION - LOCATION
LOCATION: THROAT
LOCATION: THROAT

## 2017-09-01 NOTE — PROGRESS NOTES
Patient assessed for the following post hydration:    Dizziness   No  Lightheadedness  No      Acute nausea/vomiting No  Headache   No  Chest pain/pressure  No  Rash/itching   No  Shortness of breath             No     Patient tolerated . 9NS 500ml infusion over 1hour without any complications. Voided x1; no po intake. Last vital signs:   /76  Pulse 106  Temp 99.1 °F (37.3 °C) (Oral)   Resp 18  Ht 5' 10\" (1.778 m)  Wt 123 lb 3.2 oz (55.9 kg)  SpO2 98%  BMI 17.68 kg/m2      Patient instructed if experience any of the above symptoms following today's infusion,he/she is to notify MD immediately or go to the emergency department. Discharge instructions given to patient. Verbalizes understanding. Ambulated off unit per wheelchair with family with belongings.

## 2017-09-01 NOTE — IP AVS SNAPSHOT
Patient Information     Patient Name MEE Ha 1970         This is your updated medication list to keep with you all times      ASK your doctor about these medications     albuterol sulfate  (90 Base) MCG/ACT inhaler   Commonly known as:  PROVENTIL HFA   Inhale 2 puffs into the lungs every 4 hours as needed for Wheezing or Shortness of Breath (Space out to every 6 hours as symptoms improve) Space out to every 6 hours as symptoms improve. cephALEXin 500 MG capsule   Commonly known as:  KEFLEX   Take 1 capsule by mouth 3 times daily       * fentaNYL 25 MCG/HR   Commonly known as:  [address] 1 patch onto the skin every 72 hours  Epiglottic CANCER. * fentaNYL 50 MCG/HR   Commonly known as:  [address] 1 patch onto the skin every 72 hours . * fentaNYL 600 MCG Liqd   Commonly known as:  SUBSYS   Place 600 mcg under the tongue every 6 hours as needed for Pain . * fentaNYL 25 MCG/HR   Commonly known as:  [address] 1 patch onto the skin every 72 hours for 15 days  In addition to the 50 mcg = 75 mcg. Cancer pain. Handicap Placard Misc   by Does not apply route Issued disability placard from 2017 till 2018       omeprazole 2 MG/ML Susp 2 mg/mL oral suspension   Commonly known as:  PRILOSEC   Take 20 mLs by mouth Daily       ondansetron 4 MG disintegrating tablet   Commonly known as:  ZOFRAN ODT   Place 1 tablet under your tongue every 8 hours       ondansetron 4 MG tablet   Commonly known as:  ZOFRAN   Take 1 tablet by mouth every 8 hours as needed (For nausea)       potassium chloride 20 MEQ/15ML (10%) oral solution       * Notice: This list has 4 medication(s) that are the same as other medications prescribed for you. Read the directions carefully, and ask your doctor or other care provider to review them with you.

## 2017-09-01 NOTE — PLAN OF CARE
Problem: Pain:  Intervention: Promote participation in pain management plan  Patient encouraged to take pain medication as prescribed and to call the physician if pain is uncontrolled. Patient had taken pain med before coming in for hydration. Goal: Control of acute pain  Control of acute pain   Outcome: Met This Shift  Patient rates throat pain @ \"2\" upon admission and discharge. Problem: Intellectual/Education/Knowledge Deficit  Intervention: Verbal/written education provided  Patient verbalized understanding of signs and symptoms of dehydration. Patient received . 9NS 500ml infusion over 1hour. Patient voided x1. No PO intake. Goal: Teaching initiated upon admission  Outcome: Met This Shift  Patient verbalizes understanding to verbal information given on signs and symptoms of dehydration. Aware to call MD if develop complications. Problem: Discharge Planning  Intervention: Interaction with patient/family and care team  Provide discharge instructions. Goal: Knowledge of discharge instructions  Knowledge of discharge instructions   Outcome: Met This Shift  Verbalized understanding of discharge instructions, follow-up appointments, and when to call the physician. Comments:   Care plan reviewed with patient and family. Patient and family verbalize understanding of the plan of care and contribute to goal setting.

## 2017-09-01 NOTE — IP AVS SNAPSHOT
After Visit Summary  (Discharge Instructions)    Medication List for Home    Based on the information you provided to us as well as any changes during this visit, the following is your updated medication list.  Compare this with your prescription bottles at home. If you have any questions or concerns, contact your primary care physician's office. Daily Medication List (This medication list can be shared with any healthcare provider who is helping you manage your medications)      ASK your doctor about these medications if you have questions        Last Dose    Next Dose Due AM NOON PM NIGHT    albuterol sulfate  (90 Base) MCG/ACT inhaler   Commonly known as:  PROVENTIL HFA   Inhale 2 puffs into the lungs every 4 hours as needed for Wheezing or Shortness of Breath (Space out to every 6 hours as symptoms improve) Space out to every 6 hours as symptoms improve. cephALEXin 500 MG capsule   Commonly known as:  KEFLEX   Take 1 capsule by mouth 3 times daily                                         fentaNYL 25 MCG/HR   Commonly known as:  [address] 1 patch onto the skin every 72 hours  Epiglottic CANCER. fentaNYL 50 MCG/HR   Commonly known as:  [address] 1 patch onto the skin every 72 hours . fentaNYL 600 MCG Liqd   Commonly known as:  SUBSYS   Place 600 mcg under the tongue every 6 hours as needed for Pain . fentaNYL 25 MCG/HR   Commonly known as:  [address] 1 patch onto the skin every 72 hours for 15 days  In addition to the 50 mcg = 75 mcg. Cancer pain.                                          Handicap Placard Misc   by Does not apply route Issued disability placard from August 21, 2017 till February 2018                                         omeprazole 2 MG/ML Susp 2 mg/mL oral suspension Commonly known as:  PRILOSEC   Take 20 mLs by mouth Daily                                         ondansetron 4 MG disintegrating tablet   Commonly known as:  ZOFRAN ODT   Place 1 tablet under your tongue every 8 hours                                         ondansetron 4 MG tablet   Commonly known as:  ZOFRAN   Take 1 tablet by mouth every 8 hours as needed (For nausea)                                         potassium chloride 20 MEQ/15ML (10%) oral solution   TAKE 15 MLS BY MOUTH TWICE DAILY FOR 10 DAYS                                                 Allergies as of 2017     No Known Allergies      Immunizations as of 2017     No immunizations on file. Last Vitals          Most Recent Value    Temp  99.2 °F (37.3 °C)    Pulse  113    Resp  18    BP  109/73         After Visit Summary    This summary was created for you. Thank you for entrusting your care to us. The following information includes details about your hospital/visit stay along with steps you should take to help with your recovery once you leave the hospital.  In this packet, you will find information about the topics listed below:    · Instructions about your medications including a list of your home medications  · A summary of your hospital visit  · Follow-up appointments once you have left the hospital  · Your care plan at home      You may receive a survey regarding the care you received during your stay. Your input is valuable to us. We encourage you to complete and return your survey in the envelope provided. We hope you will choose us in the future for your healthcare needs.           Patient Information     Patient Name MEE Jaime 1970      Care Provided at:     Name Address Phone       [address] [phone]            Your Visit    Here you will find information about your visit, including the reason for your visit. Please take this sheet with you when you visit your doctor or other health care provider in the future. It will help determine the best possible medical care for you at that time. If you have any questions once you leave the hospital, please call the department phone number listed below. Why you were here     Your primary diagnosis was:  Not on File      Visit Information     Date & Time Department Dept. Phone    9/1/2017 [facility] [phone]       Follow-up Appointments    Below is a list of your follow-up and future appointments. This may not be a complete list as you may have made appointments directly with providers that we are not aware of or your providers may have made some for you. Please call your providers to confirm appointments. It is important to keep your appointments. Please bring your current insurance card, photo ID, co-pay, and all medication bottles to your appointment. If self-pay, payment is expected at the time of service. Future Appointments     9/6/2017 9:30 AM     Appointment with Lilly Torres MD at [facility] ([phone])   Please arrive 15 minutes prior to appointment, bring photo ID and insurance card. Please arrive 15 minutes prior to appointment, bring photo ID and insurance card. [facility]       9/6/2017 10:30 AM     Appointment with STR OUT PT ONC INJ RM 16 at [address] [place] [address] ([phone])   [address] [place] [address], [address] [place] [address]         Preventive Care        Date Due    HIV screening is recommended for all people regardless of risk factors  aged 15-65 years at least once (lifetime) who have never been HIV tested.  12/14/1986    Tetanus Combination Vaccine (1 - Tdap) 12/14/1990    Pneumococcal Vaccine - Pneumovax for adults aged 19-64 years with: chronic heart disease, chronic lung disease, diabetes mellitus, alcoholism, chronic liver disease, or cigarette smoking. (1 of 1 - PPSV23) 12/14/1990    Cholesterol Screening 12/14/2011    Yearly Flu Vaccine (1) 8/1/2017                 Care Plan Once You Return Home    This section includes instructions you will need to follow once you leave the hospital.  Your care team will discuss these with you, so you and those caring for you know how to best care for your health needs at home. This section may also include educational information about certain health topics that may be of help to you. Discharge Instructions       Please contact your Oncologist if you have any questions regarding the hydration that you received today. Patient instructed if experience any of the symptoms following today's hydration / to notify MD immediately or go to emergency department. * dizziness/lightheadedness  *acute nausea/vomiting - not relieved with medication  *headache - not relieved from Tylenol/pain medication  *chest pain/pressure  *rash/itching  *shortness of breath        Drink fluids - 48oz fluids daily  Call if develop fever/ chills/ signs or symptoms of infection    Important information for a smoker       SMOKING: QUIT SMOKING. THIS IS THE MOST IMPORTANT ACTION YOU CAN TAKE TO IMPROVE YOUR CURRENT AND FUTURE HEALTH. Call the [address] [place] [address] at [place] NOW ([phone])    Smoking harms nonsmokers. When nonsmokers are around people who smoke, they absorb nicotine, carbon monoxide, and other ingredients of tobacco smoke. DO NOT SMOKE AROUND CHILDREN     Children exposed to secondhand smoke are at an increased risk of:  Sudden Infant Death Syndrome (SIDS), acute respiratory infections, inflammation of the middle ear, and severe asthma. Over a longer time, it causes heart disease and lung cancer. There is no safe level of exposure to secondhand smoke.                 [facility] Signup "[employer] allows you to send messages to your doctor, view your test results, renew your prescriptions, schedule appointments, view visit notes, and more. How Do I Sign Up? 1. In your Internet browser, go to [email or web address]  2. Click on the Sign Up Now link in the Sign In box. You will see the New Member Sign Up page. 3. Enter your "[employer] Access Code exactly as it appears below. You will not need to use this code after youve completed the sign-up process. If you do not sign up before the expiration date, you must request a new code. "[employer] Access Code: [email or web address]  Expires: 9/10/2017  4:33 PM    4. Enter your Social Security Number ([id number]) and Date of Birth (mm/dd/yyyy) as indicated and click Submit. You will be taken to the next sign-up page. 5. Create a "[employer] ID. This will be your "[employer] login ID and cannot be changed, so think of one that is secure and easy to remember. 6. Create a "[employer] password. You can change your password at any time. 7. Enter your Password Reset Question and Answer. This can be used at a later time if you forget your password. 8. Enter your e-mail address. You will receive e-mail notification when new information is available in [employer]. 9. Click Sign Up. You can now view your medical record. Additional Information  If you have questions, please contact the physician practice where you receive care. Remember, "[employer] is NOT to be used for urgent needs. For medical emergencies, dial 911. For questions regarding your "[employer] account call [phone]. If you have a clinical question, please call your doctor's office. View your information online  ? Review your current list of  medications, immunization, and allergies. ? Review your future test results online . ?  Review your discharge instructions provided by your caregivers at discharge    Certain functionality such as prescription refills, scheduling appointments or sending messages to your provider are not activated if your provider does not use [employer] in his/her office    For questions regarding your MyChart account call [phone]. If you have a clinical question, please call your doctor's office. The information on all pages of the After Visit Summary has been reviewed with me, the patient and/or responsible adult, by my health care provider(s). I had the opportunity to ask questions regarding this information. I understand I should dispose of my armband safely at home to protect my health information. A complete copy of the After Visit Summary has been given to me, the patient and/or responsible adult.            Patient Signature/Responsible Adult:____________________    Clinician Signature:_____________________    Date:_____________________    Time:_____________________

## 2017-09-05 ENCOUNTER — HOSPITAL ENCOUNTER (OUTPATIENT)
Dept: RADIATION ONCOLOGY | Age: 47
Discharge: HOME | End: 2017-09-05
Payer: MEDICARE

## 2017-09-05 ENCOUNTER — TELEPHONE (OUTPATIENT)
Dept: ONCOLOGY | Age: 47
End: 2017-09-05

## 2017-09-05 ENCOUNTER — TELEPHONE (OUTPATIENT)
Dept: FAMILY MEDICINE CLINIC | Age: 47
End: 2017-09-05

## 2017-09-05 PROCEDURE — 77386 HC NTSTY MODUL RAD TX DLVR CPLX: CPT | Performed by: RADIOLOGY

## 2017-09-05 RX ORDER — DOCUSATE SODIUM 50 MG/5ML
100 LIQUID ORAL 2 TIMES DAILY PRN
COMMUNITY
End: 2017-09-05 | Stop reason: SDUPTHER

## 2017-09-05 RX ORDER — DOCUSATE SODIUM 50 MG/5ML
100 LIQUID ORAL 3 TIMES DAILY
Qty: 120 ML | Refills: 2 | Status: SHIPPED | OUTPATIENT
Start: 2017-09-05

## 2017-09-06 ENCOUNTER — TELEPHONE (OUTPATIENT)
Dept: PHYSICAL MEDICINE AND REHAB | Age: 47
End: 2017-09-06

## 2017-09-06 ENCOUNTER — CLINICAL DOCUMENTATION (OUTPATIENT)
Dept: NUTRITION | Age: 47
End: 2017-09-06

## 2017-09-06 ENCOUNTER — HOSPITAL ENCOUNTER (OUTPATIENT)
Dept: INFUSION THERAPY | Age: 47
Discharge: HOME | End: 2017-09-06
Payer: MEDICARE

## 2017-09-06 ENCOUNTER — OFFICE VISIT (OUTPATIENT)
Dept: ONCOLOGY | Age: 47
End: 2017-09-06
Payer: MEDICARE

## 2017-09-06 VITALS
WEIGHT: 120.6 LBS | RESPIRATION RATE: 18 BRPM | BODY MASS INDEX: 17.26 KG/M2 | DIASTOLIC BLOOD PRESSURE: 76 MMHG | HEART RATE: 122 BPM | SYSTOLIC BLOOD PRESSURE: 106 MMHG | TEMPERATURE: 97.7 F | HEIGHT: 70 IN | OXYGEN SATURATION: 96 %

## 2017-09-06 VITALS
RESPIRATION RATE: 18 BRPM | OXYGEN SATURATION: 98 % | TEMPERATURE: 99 F | DIASTOLIC BLOOD PRESSURE: 64 MMHG | HEART RATE: 107 BPM | SYSTOLIC BLOOD PRESSURE: 105 MMHG

## 2017-09-06 DIAGNOSIS — D62 ANEMIA ASSOCIATED WITH ACUTE BLOOD LOSS: ICD-10-CM

## 2017-09-06 DIAGNOSIS — C76.0 RECENT DIAGNOSIS OF HEAD AND NECK CANCER (HCC): Primary | ICD-10-CM

## 2017-09-06 DIAGNOSIS — C76.0 RECENT DIAGNOSIS OF HEAD AND NECK CANCER (HCC): ICD-10-CM

## 2017-09-06 DIAGNOSIS — F17.219 CIGARETTE NICOTINE DEPENDENCE WITH NICOTINE-INDUCED DISORDER: ICD-10-CM

## 2017-09-06 DIAGNOSIS — R91.8 PULMONARY INFILTRATES: ICD-10-CM

## 2017-09-06 DIAGNOSIS — F17.200 SMOKER: ICD-10-CM

## 2017-09-06 DIAGNOSIS — J44.9 CHRONIC OBSTRUCTIVE PULMONARY DISEASE, UNSPECIFIED COPD TYPE (HCC): ICD-10-CM

## 2017-09-06 DIAGNOSIS — Z51.11 CHEMOTHERAPY MANAGEMENT, ENCOUNTER FOR: ICD-10-CM

## 2017-09-06 DIAGNOSIS — R04.2 COUGH WITH HEMOPTYSIS: ICD-10-CM

## 2017-09-06 DIAGNOSIS — D66 FACTOR VIII DEFICIENCY (HCC): ICD-10-CM

## 2017-09-06 DIAGNOSIS — F10.930 ALCOHOL WITHDRAWAL SYNDROME WITHOUT COMPLICATION (HCC): ICD-10-CM

## 2017-09-06 DIAGNOSIS — E86.0 DEHYDRATION, MILD: ICD-10-CM

## 2017-09-06 DIAGNOSIS — J43.9 PULMONARY EMPHYSEMA, UNSPECIFIED EMPHYSEMA TYPE (HCC): ICD-10-CM

## 2017-09-06 DIAGNOSIS — F10.11 HISTORY OF ALCOHOL ABUSE: ICD-10-CM

## 2017-09-06 DIAGNOSIS — E87.6 HYPOKALEMIA DUE TO INADEQUATE POTASSIUM INTAKE: ICD-10-CM

## 2017-09-06 LAB
BASINOPHIL, AUTOMATED: 0 % (ref 0–12)
BUN, WHOLE BLOOD: 8 MG/DL (ref 8–26)
CHLORIDE, WHOLE BLOOD: 97 MEQ/L (ref 98–109)
CREATININE, WHOLE BLOOD: 0.7 MG/DL (ref 0.5–1.2)
EOSINOPHILS RELATIVE PERCENT: 1 % (ref 0–12)
GFR, ESTIMATED: > 90 ML/MIN/1.73M2
GLUCOSE, WHOLE BLOOD: 126 MG/DL (ref 70–108)
HCT VFR BLD CALC: 35.6 % (ref 42–52)
HEMOGLOBIN: 11.8 GM/DL (ref 14–18)
IONIZED CALCIUM, WHOLE BLOOD: 1.22 MMOL/L (ref 1.12–1.32)
LYMPHOCYTES # BLD: 18 % (ref 15–47)
MCH RBC QN AUTO: 31.8 PG (ref 27–31)
MCHC RBC AUTO-ENTMCNC: 33.3 GM/DL (ref 33–37)
MCV RBC AUTO: 96 FL (ref 80–94)
MONOCYTES: 7 % (ref 0–12)
PDW BLD-RTO: 10.3 % (ref 11.5–14.5)
PLATELET # BLD: 524 THOU/MM3 (ref 130–400)
PMV BLD AUTO: 7.4 MCM (ref 7.4–10.4)
POTASSIUM, WHOLE BLOOD: 3 MEQ/L (ref 3.5–4.9)
RBC # BLD: 3.71 MILL/MM3 (ref 4.7–6.1)
SEG NEUTROPHILS: 74 % (ref 43–75)
SODIUM, WHOLE BLOOD: 134 MEQ/L (ref 138–146)
TOTAL CO2, WHOLE BLOOD: 25 MEQ/L (ref 23–33)
WBC # BLD: 7.3 THOU/MM3 (ref 4.8–10.8)

## 2017-09-06 PROCEDURE — 99211 OFF/OP EST MAY X REQ PHY/QHP: CPT

## 2017-09-06 PROCEDURE — 99214 OFFICE O/P EST MOD 30 MIN: CPT | Performed by: INTERNAL MEDICINE

## 2017-09-06 PROCEDURE — 96366 THER/PROPH/DIAG IV INF ADDON: CPT

## 2017-09-06 PROCEDURE — 2580000003 HC RX 258: Performed by: INTERNAL MEDICINE

## 2017-09-06 PROCEDURE — 85025 COMPLETE CBC W/AUTO DIFF WBC: CPT

## 2017-09-06 PROCEDURE — 6360000002 HC RX W HCPCS: Performed by: INTERNAL MEDICINE

## 2017-09-06 PROCEDURE — 36415 COLL VENOUS BLD VENIPUNCTURE: CPT

## 2017-09-06 PROCEDURE — 80047 BASIC METABLC PNL IONIZED CA: CPT

## 2017-09-06 PROCEDURE — 96365 THER/PROPH/DIAG IV INF INIT: CPT

## 2017-09-06 PROCEDURE — G0463 HOSPITAL OUTPT CLINIC VISIT: HCPCS

## 2017-09-06 RX ORDER — SODIUM CHLORIDE 0.9 % (FLUSH) 0.9 %
20 SYRINGE (ML) INJECTION PRN
Status: CANCELLED | OUTPATIENT
Start: 2017-09-06

## 2017-09-06 RX ORDER — HEPARIN SODIUM (PORCINE) LOCK FLUSH IV SOLN 100 UNIT/ML 100 UNIT/ML
500 SOLUTION INTRAVENOUS PRN
Status: CANCELLED | OUTPATIENT
Start: 2017-09-06

## 2017-09-06 RX ORDER — SODIUM CHLORIDE 0.9 % (FLUSH) 0.9 %
10 SYRINGE (ML) INJECTION PRN
Status: CANCELLED | OUTPATIENT
Start: 2017-09-06

## 2017-09-06 RX ORDER — SODIUM CHLORIDE 0.9 % (FLUSH) 0.9 %
5 SYRINGE (ML) INJECTION PRN
Status: CANCELLED | OUTPATIENT
Start: 2017-09-06

## 2017-09-06 RX ADMIN — POTASSIUM CHLORIDE: 2 INJECTION, SOLUTION, CONCENTRATE INTRAVENOUS at 14:42

## 2017-09-06 ASSESSMENT — ENCOUNTER SYMPTOMS
RECTAL PAIN: 0
DIARRHEA: 0
VOICE CHANGE: 1
CHEST TIGHTNESS: 1
TROUBLE SWALLOWING: 1
COUGH: 1
BLOOD IN STOOL: 0
COLOR CHANGE: 0
ABDOMINAL PAIN: 0
EYE DISCHARGE: 0
FACIAL SWELLING: 0
CONSTIPATION: 1
SHORTNESS OF BREATH: 1
ABDOMINAL DISTENTION: 0
SORE THROAT: 1
BACK PAIN: 0
WHEEZING: 0
NAUSEA: 0
VOMITING: 0

## 2017-09-06 NOTE — IP AVS SNAPSHOT
Patient Information     Patient Name MEE Israel 1970         This is your updated medication list to keep with you all times      ASK your doctor about these medications     albuterol sulfate  (90 Base) MCG/ACT inhaler   Commonly known as:  PROVENTIL HFA   Inhale 2 puffs into the lungs every 4 hours as needed for Wheezing or Shortness of Breath (Space out to every 6 hours as symptoms improve) Space out to every 6 hours as symptoms improve. cephALEXin 500 MG capsule   Commonly known as:  KEFLEX   Take 1 capsule by mouth 3 times daily       docusate sodium 150 MG/15ML liquid   Commonly known as:  COLACE   Take 10 mLs by mouth 3 times daily       * fentaNYL 25 MCG/HR   Commonly known as:  [address] 1 patch onto the skin every 72 hours  Epiglottic CANCER. * fentaNYL 50 MCG/HR   Commonly known as:  [address] 1 patch onto the skin every 72 hours . * fentaNYL 800 MCG Liqd   Commonly known as:  SUBSYS   Place 1 spray under the tongue every 6 hours as needed for Pain . Handicap Placard Misc   by Does not apply route Issued disability placard from 2017 till 2018       omeprazole 2 MG/ML Susp 2 mg/mL oral suspension   Commonly known as:  PRILOSEC   Take 20 mLs by mouth Daily       ondansetron 4 MG disintegrating tablet   Commonly known as:  ZOFRAN ODT   Place 1 tablet under your tongue every 8 hours       ondansetron 4 MG tablet   Commonly known as:  ZOFRAN   Take 1 tablet by mouth every 8 hours as needed (For nausea)       potassium chloride 20 MEQ/15ML (10%) oral solution       sennosides 8.8 MG/5ML syrup   Commonly known as:  SENOKOT   Take 5 mLs by mouth daily as needed (For constipation)       * Notice: This list has 3 medication(s) that are the same as other medications prescribed for you. Read the directions carefully, and ask your doctor or other care provider to review them with you.

## 2017-09-06 NOTE — PROGRESS NOTES
Hydration with potassium complete. Tolerated well. IV discontinued. Discharged in satisfactory condition. Family assist patient in wheelchair off unit.

## 2017-09-06 NOTE — PLAN OF CARE
Problem: Intellectual/Education/Knowledge Deficit  Intervention: Verbal/written education provided  Discuss hydration and potassium replacement    Goal: Teaching initiated upon admission  Outcome: Met This Shift  Verbalized understanding of hydration    Problem: Discharge Planning  Intervention: Interaction with patient/family and care team  Discuss discharge instructions, follow ups and when to call doctor. Goal: Knowledge of discharge instructions  Knowledge of discharge instructions  Outcome: Met This Shift  Verbalized understanding of discharge instructions, follow ups and when to call doctor    Comments:   Care plan reviewed with patient and family. Patient and family verbalized understanding of the plan of care and contribute to goal setting.

## 2017-09-06 NOTE — IP AVS SNAPSHOT
After Visit Summary  (Discharge Instructions)    Medication List for Home    Based on the information you provided to us as well as any changes during this visit, the following is your updated medication list.  Compare this with your prescription bottles at home. If you have any questions or concerns, contact your primary care physician's office. Daily Medication List (This medication list can be shared with any healthcare provider who is helping you manage your medications)      ASK your doctor about these medications if you have questions        Last Dose    Next Dose Due AM NOON PM NIGHT    albuterol sulfate  (90 Base) MCG/ACT inhaler   Commonly known as:  PROVENTIL HFA   Inhale 2 puffs into the lungs every 4 hours as needed for Wheezing or Shortness of Breath (Space out to every 6 hours as symptoms improve) Space out to every 6 hours as symptoms improve. cephALEXin 500 MG capsule   Commonly known as:  KEFLEX   Take 1 capsule by mouth 3 times daily                                         docusate sodium 150 MG/15ML liquid   Commonly known as:  COLACE   Take 10 mLs by mouth 3 times daily                                         fentaNYL 25 MCG/HR   Commonly known as:  [address] 1 patch onto the skin every 72 hours  Epiglottic CANCER. fentaNYL 50 MCG/HR   Commonly known as:  [address] 1 patch onto the skin every 72 hours . fentaNYL 800 MCG Liqd   Commonly known as:  SUBSYS   Place 1 spray under the tongue every 6 hours as needed for Pain .                                          Handicap Placard Misc   by Does not apply route Issued disability placard from August 21, 2017 till February 2018                                         omeprazole 2 MG/ML Susp 2 mg/mL oral suspension   Commonly known as:  PRILOSEC   Take 20 mLs by mouth Daily ondansetron 4 MG disintegrating tablet   Commonly known as:  ZOFRAN ODT   Place 1 tablet under your tongue every 8 hours                                         ondansetron 4 MG tablet   Commonly known as:  ZOFRAN   Take 1 tablet by mouth every 8 hours as needed (For nausea)                                         potassium chloride 20 MEQ/15ML (10%) oral solution   TAKE 15 MLS BY MOUTH TWICE DAILY FOR 10 DAYS                                         sennosides 8.8 MG/5ML syrup   Commonly known as:  SENOKOT   Take 5 mLs by mouth daily as needed (For constipation)                                              Where to Get Your Medications      These medications were sent to [facility] [address]     Phone:  [phone]     fentaNYL 800 MCG Liqd               Allergies as of 9/6/2017     No Known Allergies      Immunizations as of 9/6/2017     No immunizations on file. Last Vitals          Most Recent Value    Temp  99 °F (37.2 °C)    Pulse  103    Resp  18    BP  116/75         After Visit Summary    This summary was created for you. Thank you for entrusting your care to us. The following information includes details about your hospital/visit stay along with steps you should take to help with your recovery once you leave the hospital.  In this packet, you will find information about the topics listed below:    · Instructions about your medications including a list of your home medications  · A summary of your hospital visit  · Follow-up appointments once you have left the hospital  · Your care plan at home      You may receive a survey regarding the care you received during your stay. Your input is valuable to us. We encourage you to complete and return your survey in the envelope provided. We hope you will choose us in the future for your healthcare needs. Patient Information     Patient Name MEE Israel 1970      Care Provided at:     Name Address Phone       [address] [phone]            Your Visit    Here you will find information about your visit, including the reason for your visit. Please take this sheet with you when you visit your doctor or other health care provider in the future. It will help determine the best possible medical care for you at that time. If you have any questions once you leave the hospital, please call the department phone number listed below. Why you were here     Your primary diagnosis was:  Not on File      Visit Information     Date & Time Department Dept. Phone    2017 [facility] ONCOLOGY [phone]       Follow-up Appointments    Below is a list of your follow-up and future appointments. This may not be a complete list as you may have made appointments directly with providers that we are not aware of or your providers may have made some for you. Please call your providers to confirm appointments. It is important to keep your appointments. Please bring your current insurance card, photo ID, co-pay, and all medication bottles to your appointment. If self-pay, payment is expected at the time of service. Future Appointments     2017 4:30 PM     [facility] ([phone])   Please arrive 15 minutes prior to appointment, bring photo ID and insurance card. [address] [facility] [address]  [facility] [address]       2017 9:30 AM     Appointment with Janet Hooper MD at [facility] ([phone])   Please arrive 15 minutes prior to appointment, bring photo ID and insurance card. Please arrive 15 minutes prior to appointment, bring photo ID and insurance card.    [address] [place] [id number]       2017 1:40 PM Appointment with Murray Bardales CNP at [address] [place] [address] ([phone])   Please arrive 15 minutes prior to scheduled appointment time to complete paper work, bring photo ID and insurance cards. [address] [place] [id number]         Preventive Care        Date Due    HIV screening is recommended for all people regardless of risk factors  aged 15-65 years at least once (lifetime) who have never been HIV tested. 12/14/1986    Tetanus Combination Vaccine (1 - Tdap) 12/14/1990    Pneumococcal Vaccine - Pneumovax for adults aged 19-64 years with: chronic heart disease, chronic lung disease, diabetes mellitus, alcoholism, chronic liver disease, or cigarette smoking. (1 of 1 - PPSV23) 12/14/1990    Cholesterol Screening 12/14/2011    Yearly Flu Vaccine (1) 8/1/2017                 Care Plan Once You Return Home    This section includes instructions you will need to follow once you leave the hospital.  Your care team will discuss these with you, so you and those caring for you know how to best care for your health needs at home. This section may also include educational information about certain health topics that may be of help to you. Discharge Instructions       Please contact your Oncologist if you have any questions regarding the fluids that you received today. Important information for a smoker       SMOKING: QUIT SMOKING. THIS IS THE MOST IMPORTANT ACTION YOU CAN TAKE TO IMPROVE YOUR CURRENT AND FUTURE HEALTH. Call the [facility] NOW ([phone])    Smoking harms nonsmokers. When nonsmokers are around people who smoke, they absorb nicotine, carbon monoxide, and other ingredients of tobacco smoke.      DO NOT SMOKE AROUND CHILDREN     Children exposed to secondhand smoke are at an increased risk of:  Sudden Infant Death Syndrome (SIDS), acute respiratory infections, inflammation of the middle ear, and severe asthma. Over a longer time, it causes heart disease and lung cancer. There is no safe level of exposure to secondhand smoke. MyChart Signup     [employer] allows you to send messages to your doctor, view your test results, renew your prescriptions, schedule appointments, view visit notes, and more. How Do I Sign Up? 1. In your Internet browser, go to [email or web address]  2. Click on the Sign Up Now link in the Sign In box. You will see the New Member Sign Up page. 3. Enter your [employer] Access Code exactly as it appears below. You will not need to use this code after youve completed the sign-up process. If you do not sign up before the expiration date, you must request a new code. [employer] Access Code: [email or web address]  Expires: 9/10/2017  4:33 PM    4. Enter your Social Security Number ([id number]) and Date of Birth (mm/dd/yyyy) as indicated and click Submit. You will be taken to the next sign-up page. 5. Create a [employer] ID. This will be your [employer] login ID and cannot be changed, so think of one that is secure and easy to remember. 6. Create a [employer] password. You can change your password at any time. 7. Enter your Password Reset Question and Answer. This can be used at a later time if you forget your password. 8. Enter your e-mail address. You will receive e-mail notification when new information is available in [address]. 9. Click Sign Up. You can now view your medical record. Additional Information  If you have questions, please contact the physician practice where you receive care. Remember, [employer] is NOT to be used for urgent needs. For medical emergencies, dial 911. For questions regarding your [employer] account call [phone]. If you have a clinical question, please call your doctor's office. View your information online  ? Review your current list of  medications, immunization, and allergies. ? Review your future test results online . ? Review your discharge instructions provided by your caregivers at discharge    Certain functionality such as prescription refills, scheduling appointments or sending messages to your provider are not activated if your provider does not use [facility] in his/her office    For questions regarding your MyChart account call [phone]. If you have a clinical question, please call your doctor's office. The information on all pages of the After Visit Summary has been reviewed with me, the patient and/or responsible adult, by my health care provider(s). I had the opportunity to ask questions regarding this information. I understand I should dispose of my armband safely at home to protect my health information. A complete copy of the After Visit Summary has been given to me, the patient and/or responsible adult.            Patient Signature/Responsible Adult:____________________    Clinician Signature:_____________________    Date:_____________________    Time:_____________________

## 2017-09-06 NOTE — PROGRESS NOTES
Family asking when radiation therapy would be today. Radiation called and no one answered and other line was voicemail. Patient family states that radiation appointment was at 1430.

## 2017-09-07 ENCOUNTER — CLINICAL DOCUMENTATION (OUTPATIENT)
Dept: NUTRITION | Age: 47
End: 2017-09-07

## 2017-09-07 PROCEDURE — 77386 HC NTSTY MODUL RAD TX DLVR CPLX: CPT | Performed by: RADIOLOGY

## 2017-09-07 RX ORDER — FENTANYL 75 UG/H
1 PATCH TRANSDERMAL
Qty: 10 PATCH | Refills: 0 | Status: SHIPPED | OUTPATIENT
Start: 2017-09-07 | End: 2017-10-07

## 2017-09-08 PROCEDURE — 77386 HC NTSTY MODUL RAD TX DLVR CPLX: CPT | Performed by: RADIOLOGY

## 2017-09-08 RX ORDER — SODIUM CHLORIDE 0.9 % (FLUSH) 0.9 %
10 SYRINGE (ML) INJECTION PRN
Status: CANCELLED | OUTPATIENT
Start: 2017-09-11

## 2017-09-08 RX ORDER — METHYLPREDNISOLONE SODIUM SUCCINATE 125 MG/2ML
125 INJECTION, POWDER, LYOPHILIZED, FOR SOLUTION INTRAMUSCULAR; INTRAVENOUS ONCE
Status: CANCELLED | OUTPATIENT
Start: 2017-09-11 | End: 2017-09-11

## 2017-09-08 RX ORDER — DIPHENHYDRAMINE HYDROCHLORIDE 50 MG/ML
50 INJECTION INTRAMUSCULAR; INTRAVENOUS ONCE
Status: CANCELLED | OUTPATIENT
Start: 2017-09-11 | End: 2017-09-11

## 2017-09-08 RX ORDER — 0.9 % SODIUM CHLORIDE 0.9 %
10 VIAL (ML) INJECTION ONCE
Status: CANCELLED | OUTPATIENT
Start: 2017-09-11 | End: 2017-09-11

## 2017-09-08 RX ORDER — HEPARIN SODIUM (PORCINE) LOCK FLUSH IV SOLN 100 UNIT/ML 100 UNIT/ML
500 SOLUTION INTRAVENOUS PRN
Status: CANCELLED | OUTPATIENT
Start: 2017-09-11

## 2017-09-08 RX ORDER — SODIUM CHLORIDE 0.9 % (FLUSH) 0.9 %
5 SYRINGE (ML) INJECTION PRN
Status: CANCELLED | OUTPATIENT
Start: 2017-09-11

## 2017-09-08 RX ORDER — SODIUM CHLORIDE 9 MG/ML
INJECTION, SOLUTION INTRAVENOUS ONCE
Status: CANCELLED | OUTPATIENT
Start: 2017-09-11 | End: 2017-09-11

## 2017-09-08 RX ORDER — SODIUM CHLORIDE 9 MG/ML
INJECTION, SOLUTION INTRAVENOUS CONTINUOUS
Status: CANCELLED | OUTPATIENT
Start: 2017-09-11

## 2017-09-08 RX ORDER — PALONOSETRON 0.05 MG/ML
0.25 INJECTION, SOLUTION INTRAVENOUS ONCE
Status: CANCELLED | OUTPATIENT
Start: 2017-09-11

## 2017-09-11 ENCOUNTER — HOSPITAL ENCOUNTER (OUTPATIENT)
Dept: INFUSION THERAPY | Age: 47
Discharge: HOME | End: 2017-09-11
Payer: MEDICARE

## 2017-09-11 ENCOUNTER — OFFICE VISIT (OUTPATIENT)
Dept: ONCOLOGY | Age: 47
End: 2017-09-11
Payer: MEDICARE

## 2017-09-11 ENCOUNTER — HOSPITAL ENCOUNTER (OUTPATIENT)
Dept: RADIATION ONCOLOGY | Age: 47
Discharge: HOME | End: 2017-09-11
Payer: MEDICARE

## 2017-09-11 VITALS
DIASTOLIC BLOOD PRESSURE: 71 MMHG | OXYGEN SATURATION: 98 % | WEIGHT: 120.8 LBS | TEMPERATURE: 98.2 F | BODY MASS INDEX: 17.29 KG/M2 | HEART RATE: 120 BPM | HEIGHT: 70 IN | RESPIRATION RATE: 18 BRPM | SYSTOLIC BLOOD PRESSURE: 102 MMHG

## 2017-09-11 VITALS
RESPIRATION RATE: 18 BRPM | OXYGEN SATURATION: 98 % | DIASTOLIC BLOOD PRESSURE: 51 MMHG | HEART RATE: 87 BPM | TEMPERATURE: 98.1 F | SYSTOLIC BLOOD PRESSURE: 95 MMHG

## 2017-09-11 DIAGNOSIS — G89.3 CANCER RELATED PAIN: ICD-10-CM

## 2017-09-11 DIAGNOSIS — Z51.11 CHEMOTHERAPY MANAGEMENT, ENCOUNTER FOR: Primary | ICD-10-CM

## 2017-09-11 DIAGNOSIS — E86.0 DEHYDRATION, MILD: ICD-10-CM

## 2017-09-11 DIAGNOSIS — Z51.11 CHEMOTHERAPY MANAGEMENT, ENCOUNTER FOR: ICD-10-CM

## 2017-09-11 DIAGNOSIS — D62 ANEMIA ASSOCIATED WITH ACUTE BLOOD LOSS: ICD-10-CM

## 2017-09-11 DIAGNOSIS — C76.0 RECENT DIAGNOSIS OF HEAD AND NECK CANCER (HCC): ICD-10-CM

## 2017-09-11 DIAGNOSIS — E87.6 HYPOKALEMIA DUE TO INADEQUATE POTASSIUM INTAKE: ICD-10-CM

## 2017-09-11 DIAGNOSIS — J44.9 CHRONIC OBSTRUCTIVE PULMONARY DISEASE, UNSPECIFIED COPD TYPE (HCC): ICD-10-CM

## 2017-09-11 LAB
ALBUMIN SERPL-MCNC: 3.5 G/DL (ref 3.5–5.1)
ALP BLD-CCNC: 78 U/L (ref 38–126)
ALT SERPL-CCNC: 12 U/L (ref 11–66)
AST SERPL-CCNC: 18 U/L (ref 5–40)
BASINOPHIL, AUTOMATED: 0 % (ref 0–12)
BILIRUB SERPL-MCNC: < 0.2 MG/DL (ref 0.3–1.2)
BILIRUBIN DIRECT: < 0.2 MG/DL (ref 0–0.3)
BUN, WHOLE BLOOD: 11 MG/DL (ref 8–26)
CHLORIDE, WHOLE BLOOD: 99 MEQ/L (ref 98–109)
CREATININE, WHOLE BLOOD: 0.6 MG/DL (ref 0.5–1.2)
EOSINOPHILS RELATIVE PERCENT: 4 % (ref 0–12)
GFR, ESTIMATED: > 90 ML/MIN/1.73M2
GLUCOSE, WHOLE BLOOD: 107 MG/DL (ref 70–108)
HCT VFR BLD CALC: 34.9 % (ref 42–52)
HEMOGLOBIN: 11.5 GM/DL (ref 14–18)
IONIZED CALCIUM, WHOLE BLOOD: 1.21 MMOL/L (ref 1.12–1.32)
LYMPHOCYTES # BLD: 16 % (ref 15–47)
MCH RBC QN AUTO: 31.8 PG (ref 27–31)
MCHC RBC AUTO-ENTMCNC: 33 GM/DL (ref 33–37)
MCV RBC AUTO: 97 FL (ref 80–94)
MONOCYTES: 8 % (ref 0–12)
PDW BLD-RTO: 11 % (ref 11.5–14.5)
PLATELET # BLD: 499 THOU/MM3 (ref 130–400)
PMV BLD AUTO: 7.7 MCM (ref 7.4–10.4)
POTASSIUM, WHOLE BLOOD: 3.2 MEQ/L (ref 3.5–4.9)
RBC # BLD: 3.61 MILL/MM3 (ref 4.7–6.1)
SEG NEUTROPHILS: 71 % (ref 43–75)
SODIUM, WHOLE BLOOD: 137 MEQ/L (ref 138–146)
TOTAL CO2, WHOLE BLOOD: 28 MEQ/L (ref 23–33)
TOTAL PROTEIN: 7.8 G/DL (ref 6.1–8)
WBC # BLD: 5.9 THOU/MM3 (ref 4.8–10.8)

## 2017-09-11 PROCEDURE — 77386 HC NTSTY MODUL RAD TX DLVR CPLX: CPT

## 2017-09-11 PROCEDURE — 96367 TX/PROPH/DG ADDL SEQ IV INF: CPT

## 2017-09-11 PROCEDURE — 80047 BASIC METABLC PNL IONIZED CA: CPT

## 2017-09-11 PROCEDURE — G0463 HOSPITAL OUTPT CLINIC VISIT: HCPCS

## 2017-09-11 PROCEDURE — 85025 COMPLETE CBC W/AUTO DIFF WBC: CPT

## 2017-09-11 PROCEDURE — 2580000003 HC RX 258: Performed by: INTERNAL MEDICINE

## 2017-09-11 PROCEDURE — 77336 RADIATION PHYSICS CONSULT: CPT

## 2017-09-11 PROCEDURE — 96413 CHEMO IV INFUSION 1 HR: CPT

## 2017-09-11 PROCEDURE — 96375 TX/PRO/DX INJ NEW DRUG ADDON: CPT

## 2017-09-11 PROCEDURE — 6360000002 HC RX W HCPCS: Performed by: INTERNAL MEDICINE

## 2017-09-11 PROCEDURE — 96366 THER/PROPH/DIAG IV INF ADDON: CPT

## 2017-09-11 PROCEDURE — 80076 HEPATIC FUNCTION PANEL: CPT

## 2017-09-11 PROCEDURE — 96415 CHEMO IV INFUSION ADDL HR: CPT

## 2017-09-11 PROCEDURE — 36415 COLL VENOUS BLD VENIPUNCTURE: CPT

## 2017-09-11 PROCEDURE — 99214 OFFICE O/P EST MOD 30 MIN: CPT | Performed by: INTERNAL MEDICINE

## 2017-09-11 RX ORDER — POTASSIUM CHLORIDE 750 MG/1
10 CAPSULE, EXTENDED RELEASE ORAL 3 TIMES DAILY
COMMUNITY
End: 2017-10-24

## 2017-09-11 RX ORDER — SODIUM CHLORIDE 9 MG/ML
INJECTION, SOLUTION INTRAVENOUS ONCE
Status: COMPLETED | OUTPATIENT
Start: 2017-09-11 | End: 2017-09-11

## 2017-09-11 RX ORDER — PROMETHAZINE HYDROCHLORIDE 6.25 MG/5ML
6.25 SYRUP ORAL 4 TIMES DAILY PRN
COMMUNITY

## 2017-09-11 RX ORDER — PROMETHAZINE HYDROCHLORIDE 25 MG/ML
6.25 INJECTION, SOLUTION INTRAMUSCULAR; INTRAVENOUS EVERY 6 HOURS PRN
COMMUNITY
End: 2017-09-11 | Stop reason: CLARIF

## 2017-09-11 RX ORDER — PALONOSETRON 0.05 MG/ML
0.25 INJECTION, SOLUTION INTRAVENOUS ONCE
Status: COMPLETED | OUTPATIENT
Start: 2017-09-11 | End: 2017-09-11

## 2017-09-11 RX ADMIN — MANNITOL: 250 INJECTION, SOLUTION INTRAVENOUS at 16:03

## 2017-09-11 RX ADMIN — SODIUM CHLORIDE: 9 INJECTION, SOLUTION INTRAVENOUS at 12:40

## 2017-09-11 RX ADMIN — PALONOSETRON HYDROCHLORIDE 0.25 MG: 0.25 INJECTION INTRAVENOUS at 12:49

## 2017-09-11 RX ADMIN — SODIUM CHLORIDE 150 MG: 9 INJECTION, SOLUTION INTRAVENOUS at 13:23

## 2017-09-11 RX ADMIN — POTASSIUM CHLORIDE: 2 INJECTION, SOLUTION, CONCENTRATE INTRAVENOUS at 13:55

## 2017-09-11 RX ADMIN — DEXAMETHASONE SODIUM PHOSPHATE 12 MG: 4 INJECTION, SOLUTION INTRAMUSCULAR; INTRAVENOUS at 12:52

## 2017-09-11 ASSESSMENT — PAIN SCALES - GENERAL: PAINLEVEL_OUTOF10: 0

## 2017-09-11 NOTE — IP AVS SNAPSHOT
Patient Information     Patient Name MEE Gayle 1970         This is your updated medication list to keep with you all times      ASK your doctor about these medications     albuterol sulfate  (90 Base) MCG/ACT inhaler   Commonly known as:  PROVENTIL HFA   Inhale 2 puffs into the lungs every 4 hours as needed for Wheezing or Shortness of Breath (Space out to every 6 hours as symptoms improve) Space out to every 6 hours as symptoms improve. cephALEXin 500 MG capsule   Commonly known as:  KEFLEX   Take 1 capsule by mouth 3 times daily       docusate sodium 150 MG/15ML liquid   Commonly known as:  COLACE   Take 10 mLs by mouth 3 times daily       * fentaNYL 25 MCG/HR   Commonly known as:  [address] 1 patch onto the skin every 72 hours  Epiglottic CANCER. * fentaNYL 800 MCG Liqd   Commonly known as:  SUBSYS   Place 1 spray under the tongue every 6 hours as needed for Pain . * fentaNYL 75 MCG/HR   Commonly known as:  [address] 1 patch onto the skin every 72 hours  CANCER. Handicap Placard Misc   by Does not apply route Issued disability placard from 2017 till 2018       omeprazole 2 MG/ML Susp 2 mg/mL oral suspension   Commonly known as:  PRILOSEC   Take 20 mLs by mouth Daily       ondansetron 4 MG disintegrating tablet   Commonly known as:  ZOFRAN ODT   Place 1 tablet under your tongue every 8 hours       ondansetron 4 MG tablet   Commonly known as:  ZOFRAN   Take 1 tablet by mouth every 8 hours as needed (For nausea)       potassium chloride 10 MEQ extended release capsule   Commonly known as:  MICRO-K       promethazine 6.25 MG/5ML syrup   Commonly known as:  PHENERGAN       sennosides 8.8 MG/5ML syrup   Commonly known as:  SENOKOT   Take 5 mLs by mouth daily as needed (For constipation)       * Notice: This list has 3 medication(s) that are the same as other medications prescribed for you.  Read the directions carefully, and ask your doctor or other care provider to review them with you.

## 2017-09-11 NOTE — PROGRESS NOTES
Chemotherapy Administration    Pre-assessment Data: Antineoplastic Agents  Other:   See toxicity flow sheet for assessment [x]     Physician Notification of Concerns Related to Chemotherapy Administration:   Physician Notified Reid Evangelista / Time of Notification      Interventions:   Lab work assessed  [x]   Height / Weight verified for dose [x]   Current MAR reviewed [x]   Emergency drugs available as appropriate [x]   Anaphylaxis assessment completed [x]   Pre-medications administered as ordered [x]   Blood return noted upon initiation of chemotherapy [x]   Blood return noted each 2-3ml of a vesicant medication if given IV push []   Blood return noted each 3-5ml of a non-vesicant medication if given IV push []   Monitor for signs / symptoms of hypersensitivity reaction [x]   Chemotherapy orders (drug/dose/rate) verified by 2 Chemo certified RNs [x]   Monitor IV site and blood return throughout the infusion of the medication [x]   Document IV site checks on the IV assessment form [x]   Document chemotherapy teaching on the Patient Education tab [x]   Document patient verbalizes understanding of medications being administered [x]   If IV infiltration, see ONS Guidelines []   Other:      []

## 2017-09-11 NOTE — IP AVS SNAPSHOT
After Visit Summary  (Discharge Instructions)    Medication List for Home    Based on the information you provided to us as well as any changes during this visit, the following is your updated medication list.  Compare this with your prescription bottles at home. If you have any questions or concerns, contact your primary care physician's office. Daily Medication List (This medication list can be shared with any healthcare provider who is helping you manage your medications)      ASK your doctor about these medications if you have questions        Last Dose    Next Dose Due AM NOON PM NIGHT    albuterol sulfate  (90 Base) MCG/ACT inhaler   Commonly known as:  PROVENTIL HFA   Inhale 2 puffs into the lungs every 4 hours as needed for Wheezing or Shortness of Breath (Space out to every 6 hours as symptoms improve) Space out to every 6 hours as symptoms improve. cephALEXin 500 MG capsule   Commonly known as:  KEFLEX   Take 1 capsule by mouth 3 times daily                                         docusate sodium 150 MG/15ML liquid   Commonly known as:  COLACE   Take 10 mLs by mouth 3 times daily                                         fentaNYL 25 MCG/HR   Commonly known as:  [address] 1 patch onto the skin every 72 hours  Epiglottic CANCER. fentaNYL 800 MCG Liqd   Commonly known as:  SUBSYS   Place 1 spray under the tongue every 6 hours as needed for Pain . fentaNYL 75 MCG/HR   Commonly known as:  [address] 1 patch onto the skin every 72 hours  CANCER.                                          Handicap Placard Misc   by Does not apply route Issued disability placard from August 21, 2017 till February 2018                                         omeprazole 2 MG/ML Susp 2 mg/mL oral suspension   Commonly known as:  PRILOSEC   Take 20 mLs by mouth Daily ondansetron 4 MG disintegrating tablet   Commonly known as:  ZOFRAN ODT   Place 1 tablet under your tongue every 8 hours                                         ondansetron 4 MG tablet   Commonly known as:  ZOFRAN   Take 1 tablet by mouth every 8 hours as needed (For nausea)                                         potassium chloride 10 MEQ extended release capsule   Commonly known as:  MICRO-K   Take 10 mEq by mouth 3 times daily                                         promethazine 6.25 MG/5ML syrup   Commonly known as:  PHENERGAN   Take 6.25 mg by mouth 4 times daily as needed for Nausea                                         sennosides 8.8 MG/5ML syrup   Commonly known as:  SENOKOT   Take 5 mLs by mouth daily as needed (For constipation)                                                 Allergies as of 2017     No Known Allergies      Immunizations as of 2017     No immunizations on file. Last Vitals          Most Recent Value    Temp  98.6 °F (37 °C)    Pulse  101    Resp  18    BP  92/61         After Visit Summary    This summary was created for you. Thank you for entrusting your care to us. The following information includes details about your hospital/visit stay along with steps you should take to help with your recovery once you leave the hospital.  In this packet, you will find information about the topics listed below:    · Instructions about your medications including a list of your home medications  · A summary of your hospital visit  · Follow-up appointments once you have left the hospital  · Your care plan at home      You may receive a survey regarding the care you received during your stay. Your input is valuable to us. We encourage you to complete and return your survey in the envelope provided. We hope you will choose us in the future for your healthcare needs.           Patient Information     Patient Name MEE Fisher 1970 Care Provided at:     Name Address Phone       [address] [phone]            Your Visit    Here you will find information about your visit, including the reason for your visit. Please take this sheet with you when you visit your doctor or other health care provider in the future. It will help determine the best possible medical care for you at that time. If you have any questions once you leave the hospital, please call the department phone number listed below. Why you were here     Your primary diagnosis was:  Not on File      Visit Information     Date & Time Department Dept. Phone    9/11/2017 [facility] [phone]       Follow-up Appointments    Below is a list of your follow-up and future appointments. This may not be a complete list as you may have made appointments directly with providers that we are not aware of or your providers may have made some for you. Please call your providers to confirm appointments. It is important to keep your appointments. Please bring your current insurance card, photo ID, co-pay, and all medication bottles to your appointment. If self-pay, payment is expected at the time of service. Follow-up Information     Follow up with [address] [place] [address]  Oncology On 9/12/2017. Why:  @ 1pm for fluids    Contact information:    [address]  [address] [place] [address]  [facility]      Future Appointments     9/13/2017 12:00 PM     Appointment with STR OUT PT [facility] 16 at 100 [facility] ([phone], [address]  [facility] 83       9/19/2017 1:30 PM     Appointment with Candace Rueda CNP at [address] ([phone])   Please arrive 15 minutes prior to appointment, bring photo ID and insurance card. [address] [facility] [address]  [facility] [address]       9/21/2017 1:40 PM Appointment with Tolu Steele CNP [facility] ([phone])   Please arrive 15 minutes prior to scheduled appointment time to complete paper work, bring photo ID and insurance cards. [place] [id number]       10/2/2017 9:30 AM     Appointment with Nakia Martins MD at [facility] ([phone])   Please arrive 15 minutes prior to appointment, bring photo ID and insurance card. Please arrive 15 minutes prior to appointment, bring photo ID and insurance card. [address] [facility]       10/2/2017 10:30 AM     Appointment with STR OUT PT ONC INJ RM 12 at 100 [facility] ([phone])   [address] [place] [address], [address]  [facility]         Preventive Care        Date Due    HIV screening is recommended for all people regardless of risk factors  aged 15-65 years at least once (lifetime) who have never been HIV tested. 12/14/1986    Tetanus Combination Vaccine (1 - Tdap) 12/14/1990    Pneumococcal Vaccine - Pneumovax for adults aged 19-64 years with: chronic heart disease, chronic lung disease, diabetes mellitus, alcoholism, chronic liver disease, or cigarette smoking. (1 of 1 - PPSV23) 12/14/1990    Cholesterol Screening 12/14/2011    Yearly Flu Vaccine (1) 9/1/2017                 Care Plan Once You Return Home    This section includes instructions you will need to follow once you leave the hospital.  Your care team will discuss these with you, so you and those caring for you know how to best care for your health needs at home. This section may also include educational information about certain health topics that may be of help to you. Discharge Instructions       Please contact your Oncologist if you have any questions regarding the chemotherapy cisplatin that you received today. Patient instructed if experience any of the symptoms following today's chemotherapy / to notify MD immediately or go to emergency department. * dizziness/lightheadedness  *acute nausea/vomiting - not relieved with medication  *headache - not relieved from Tylenol/pain medication  *chest pain/pressure  *rash/itching  *shortness of breath        Drink fluids - 48oz fluids daily  Call if develop fever/ chills/ signs or symptoms of infection    Important information for a smoker       SMOKING: QUIT SMOKING. THIS IS THE MOST IMPORTANT ACTION YOU CAN TAKE TO IMPROVE YOUR CURRENT AND FUTURE HEALTH. Call the [facility] [place] NOW ([phone])    Smoking harms nonsmokers. When nonsmokers are around people who smoke, they absorb nicotine, carbon monoxide, and other ingredients of tobacco smoke. DO NOT SMOKE AROUND CHILDREN     Children exposed to secondhand smoke are at an increased risk of:  Sudden Infant Death Syndrome (SIDS), acute respiratory infections, inflammation of the middle ear, and severe asthma. Over a longer time, it causes heart disease and lung cancer. There is no safe level of exposure to secondhand smoke. AppJetharBlackford Analysis Signup     Lumiary allows you to send messages to your doctor, view your test results, renew your prescriptions, schedule appointments, view visit notes, and more. How Do I Sign Up? 1. In your Internet browser, go to [email or web address]  2. Click on the Sign Up Now link in the Sign In box. You will see the New Member Sign Up page. 3. Enter your Lumiary Access Code exactly as it appears below. You will not need to use this code after youve completed the sign-up process. If you do not sign up before the expiration date, you must request a new code. Lumiary Access Code: [email or web address]  Expires: 11/10/2017 11:01 AM    4. Enter your Social Security Number ([id number]) and Date of Birth (mm/dd/yyyy) as indicated and click Submit. You will be taken to the next sign-up page. 5. Create a Lumiary ID.  This will be your Lumiary login ID and cannot be changed, so think of one that is secure and easy to remember. 6. Create a Dartfish password. You can change your password at any time. 7. Enter your Password Reset Question and Answer. This can be used at a later time if you forget your password. 8. Enter your e-mail address. You will receive e-mail notification when new information is available in [address] 9. Click Sign Up. You can now view your medical record. Additional Information  If you have questions, please contact the physician practice where you receive care. Remember, Dartfish is NOT to be used for urgent needs. For medical emergencies, dial 911. For questions regarding your Revolightst account call [phone]. If you have a clinical question, please call your doctor's office. View your information online  ? Review your current list of  medications, immunization, and allergies. ? Review your future test results online . ? Review your discharge instructions provided by your caregivers at discharge    Certain functionality such as prescription refills, scheduling appointments or sending messages to your provider are not activated if your provider does not use Join The Wellness Team in his/her office    For questions regarding your Revolightst account call [phone]. If you have a clinical question, please call your doctor's office. The information on all pages of the After Visit Summary has been reviewed with me, the patient and/or responsible adult, by my health care provider(s). I had the opportunity to ask questions regarding this information. I understand I should dispose of my armband safely at home to protect my health information. A complete copy of the After Visit Summary has been given to me, the patient and/or responsible adult.            Patient Signature/Responsible Adult:____________________    Clinician Signature:_____________________    Date:_____________________    Time:_____________________

## 2017-09-11 NOTE — PROGRESS NOTES
Patient assessed for the following post chemotherapy:    Dizziness   No  Lightheadedness  No      Acute nausea/vomiting No  Headache   No  Chest pain/pressure  No  Rash/itching   No  Shortness of breath  No    Patient kept for 20 minutes observation post infusion chemotherapy. Patient tolerated chemotherapy treatment with cisplatin without any complications. Last vital signs:   BP (!) 95/51  Pulse 87  Temp 98.1 °F (36.7 °C) (Oral)   Resp 18  SpO2 98%        Patient instructed if experience any of the above symptoms following today's infusion,he is to notify MD immediately or go to the emergency department. Discharge instructions given to patient. Verbalizes understanding. Ambulated off unit with family and with belongings.

## 2017-09-11 NOTE — PLAN OF CARE
Problem: Musculor/Skeletal Functional Status  Intervention: Fall precautions  Patient aware of fall precautions for here and at home    Goal: Absence of falls  Outcome: Met This Shift  No falls this admission     Problem: Intellectual/Education/Knowledge Deficit  Intervention: Verbal/written education provided  Discharge instruction sheets    Goal: Teaching initiated upon admission  Outcome: Met This Shift  Chemotherapy Teaching      What is Chemotherapy   Drug action [X]   Method of Administration [X]   Handouts given [ ]      Side Effects  Nausea/vomiting [X]   Diarrhea [X]   Fatigue [X]   Signs / Symptoms of infection [X]   Neutropenia [X]   Thrombocytopenia [X]   Alopecia [X]   neuropathy [X]   Clementon diet &  the importance of fluids [X]         Micellaneous  Importance of nutrition [X]   Importance of oral hygiene [X]   When to call the MD [X]   Monitoring labs [X]   Use of supportive services [ ]      Explanation of Drug   cisplatin      Comments  Verbalized understanding to drug,action,side effects and when to call MD       Goal: Written Disposition Instruction form completed  Outcome: Met This Shift  Discharge instructions given and reviewed with patient. All questions answered. Patient verbalized understanding    Problem: Discharge Planning  Intervention: Interaction with patient/family and care team  Patient and family currently denies any needs or concerns  Intervention: Discharge to appropriate level of care  Discharge instruction sheets    Goal: Knowledge of discharge instructions  Knowledge of discharge instructions  Outcome: Met This Shift  Patient and family member able to teach back follow up appointments and when to call the doctor. Patient offers no questions at this time    Comments:   Care plan reviewed with patient and family. Patient and family verbalize understanding of the plan of care and contribute to goal setting.

## 2017-09-12 ENCOUNTER — HOSPITAL ENCOUNTER (OUTPATIENT)
Dept: INFUSION THERAPY | Age: 47
Discharge: HOME | End: 2017-09-12
Payer: MEDICARE

## 2017-09-12 VITALS
TEMPERATURE: 98.7 F | SYSTOLIC BLOOD PRESSURE: 93 MMHG | HEART RATE: 97 BPM | DIASTOLIC BLOOD PRESSURE: 58 MMHG | RESPIRATION RATE: 18 BRPM | OXYGEN SATURATION: 98 %

## 2017-09-12 DIAGNOSIS — J44.9 CHRONIC OBSTRUCTIVE PULMONARY DISEASE, UNSPECIFIED COPD TYPE (HCC): ICD-10-CM

## 2017-09-12 DIAGNOSIS — F17.200 SMOKER: ICD-10-CM

## 2017-09-12 DIAGNOSIS — F10.11 HISTORY OF ALCOHOL ABUSE: ICD-10-CM

## 2017-09-12 DIAGNOSIS — J43.9 PULMONARY EMPHYSEMA, UNSPECIFIED EMPHYSEMA TYPE (HCC): ICD-10-CM

## 2017-09-12 DIAGNOSIS — F17.219 CIGARETTE NICOTINE DEPENDENCE WITH NICOTINE-INDUCED DISORDER: ICD-10-CM

## 2017-09-12 DIAGNOSIS — R04.2 COUGH WITH HEMOPTYSIS: ICD-10-CM

## 2017-09-12 DIAGNOSIS — C76.0 RECENT DIAGNOSIS OF HEAD AND NECK CANCER (HCC): ICD-10-CM

## 2017-09-12 DIAGNOSIS — D66 FACTOR VIII DEFICIENCY (HCC): ICD-10-CM

## 2017-09-12 DIAGNOSIS — D62 ANEMIA ASSOCIATED WITH ACUTE BLOOD LOSS: ICD-10-CM

## 2017-09-12 DIAGNOSIS — F10.930 ALCOHOL WITHDRAWAL SYNDROME WITHOUT COMPLICATION (HCC): ICD-10-CM

## 2017-09-12 DIAGNOSIS — R91.8 PULMONARY INFILTRATES: ICD-10-CM

## 2017-09-12 PROCEDURE — 6360000002 HC RX W HCPCS: Performed by: INTERNAL MEDICINE

## 2017-09-12 PROCEDURE — 2580000003 HC RX 258: Performed by: INTERNAL MEDICINE

## 2017-09-12 PROCEDURE — 96365 THER/PROPH/DIAG IV INF INIT: CPT

## 2017-09-12 PROCEDURE — 77386 HC NTSTY MODUL RAD TX DLVR CPLX: CPT

## 2017-09-12 RX ORDER — SODIUM CHLORIDE 0.9 % (FLUSH) 0.9 %
5 SYRINGE (ML) INJECTION PRN
Status: CANCELLED | OUTPATIENT
Start: 2017-09-12

## 2017-09-12 RX ORDER — SODIUM CHLORIDE 0.9 % (FLUSH) 0.9 %
10 SYRINGE (ML) INJECTION PRN
Status: CANCELLED | OUTPATIENT
Start: 2017-09-12

## 2017-09-12 RX ORDER — SODIUM CHLORIDE 0.9 % (FLUSH) 0.9 %
20 SYRINGE (ML) INJECTION PRN
Status: CANCELLED | OUTPATIENT
Start: 2017-09-12

## 2017-09-12 RX ORDER — HEPARIN SODIUM (PORCINE) LOCK FLUSH IV SOLN 100 UNIT/ML 100 UNIT/ML
500 SOLUTION INTRAVENOUS PRN
Status: CANCELLED | OUTPATIENT
Start: 2017-09-12

## 2017-09-12 RX ORDER — SODIUM CHLORIDE 0.9 % (FLUSH) 0.9 %
10 SYRINGE (ML) INJECTION PRN
Status: DISCONTINUED | OUTPATIENT
Start: 2017-09-12 | End: 2017-09-13 | Stop reason: HOSPADM

## 2017-09-12 ASSESSMENT — PAIN SCALES - GENERAL
PAINLEVEL_OUTOF10: 0

## 2017-09-12 ASSESSMENT — ENCOUNTER SYMPTOMS
DIARRHEA: 0
COUGH: 1
VOMITING: 0
NAUSEA: 0
TROUBLE SWALLOWING: 1
SORE THROAT: 1
CONSTIPATION: 1
SHORTNESS OF BREATH: 1
FACIAL SWELLING: 0
BLOOD IN STOOL: 0
CHEST TIGHTNESS: 1
BACK PAIN: 0
COLOR CHANGE: 0
RECTAL PAIN: 0
VOICE CHANGE: 1
WHEEZING: 0
ABDOMINAL PAIN: 0
EYE DISCHARGE: 0
ABDOMINAL DISTENTION: 0

## 2017-09-12 NOTE — IP AVS SNAPSHOT
After Visit Summary  (Discharge Instructions)    Medication List for Home    Based on the information you provided to us as well as any changes during this visit, the following is your updated medication list.  Compare this with your prescription bottles at home. If you have any questions or concerns, contact your primary care physician's office. Daily Medication List (This medication list can be shared with any healthcare provider who is helping you manage your medications)      ASK your doctor about these medications if you have questions        Last Dose    Next Dose Due AM NOON PM NIGHT    albuterol sulfate  (90 Base) MCG/ACT inhaler   Commonly known as:  PROVENTIL HFA   Inhale 2 puffs into the lungs every 4 hours as needed for Wheezing or Shortness of Breath (Space out to every 6 hours as symptoms improve) Space out to every 6 hours as symptoms improve. cephALEXin 500 MG capsule   Commonly known as:  KEFLEX   Take 1 capsule by mouth 3 times daily                                         docusate sodium 150 MG/15ML liquid   Commonly known as:  COLACE   Take 10 mLs by mouth 3 times daily                                         fentaNYL 25 MCG/HR   Commonly known as:  [address] 1 patch onto the skin every 72 hours  Epiglottic CANCER. fentaNYL 800 MCG Liqd   Commonly known as:  SUBSYS   Place 1 spray under the tongue every 6 hours as needed for Pain . fentaNYL 75 MCG/HR   Commonly known as:  [address] 1 patch onto the skin every 72 hours  CANCER.                                          Handicap Placard Misc   by Does not apply route Issued disability placard from August 21, 2017 till February 2018                                         omeprazole 2 MG/ML Susp 2 mg/mL oral suspension   Commonly known as:  PRILOSEC   Take 20 mLs by mouth Daily Care Provided at:     Name Address Phone       [address] [phone]            Your Visit    Here you will find information about your visit, including the reason for your visit. Please take this sheet with you when you visit your doctor or other health care provider in the future. It will help determine the best possible medical care for you at that time. If you have any questions once you leave the hospital, please call the department phone number listed below. Why you were here     Your primary diagnosis was:  Not on File      Visit Information     Date & Time Department Dept. Phone    9/12/2017 [facility] [phone]       Follow-up Appointments    Below is a list of your follow-up and future appointments. This may not be a complete list as you may have made appointments directly with providers that we are not aware of or your providers may have made some for you. Please call your providers to confirm appointments. It is important to keep your appointments. Please bring your current insurance card, photo ID, co-pay, and all medication bottles to your appointment. If self-pay, payment is expected at the time of service. Future Appointments     9/13/2017 12:00 PM     Appointment with STR OUT PT [facility] 16 at 100 [facility] ([phone], [address]  [facility] 83       9/19/2017 1:30 PM     Appointment with Ivy Klein CNP at [address] [facility] ([phone])   Please arrive 15 minutes prior to appointment, bring photo ID and insurance card. [address] [facility] [address]  [facility] [address]       9/21/2017 1:40 PM     Appointment with Jude Church CNP at [address] [place] [address] ([phone])   Please arrive 15 minutes prior to scheduled appointment time to complete paper work, bring photo ID and insurance cards.    [place] [phone]       10/2/2017 9:30 AM Appointment with Sung Ch MD at [facility] ([phone])   Please arrive 15 minutes prior to appointment, bring photo ID and insurance card. Please arrive 15 minutes prior to appointment, bring photo ID and insurance card. [facility] [facility]       10/2/2017 10:30 AM     Appointment with STR OUT PT ONC INJ RM 12 at 100 [facility] ([phone])   [address] [place] [address], [address]  [place] [address]         Preventive Care        Date Due    HIV screening is recommended for all people regardless of risk factors  aged 15-65 years at least once (lifetime) who have never been HIV tested. 12/14/1986    Tetanus Combination Vaccine (1 - Tdap) 12/14/1990    Pneumococcal Vaccine - Pneumovax for adults aged 19-64 years with: chronic heart disease, chronic lung disease, diabetes mellitus, alcoholism, chronic liver disease, or cigarette smoking. (1 of 1 - PPSV23) 12/14/1990    Cholesterol Screening 12/14/2011    Yearly Flu Vaccine (1) 9/1/2017                 Care Plan Once You Return Home    This section includes instructions you will need to follow once you leave the hospital.  Your care team will discuss these with you, so you and those caring for you know how to best care for your health needs at home. This section may also include educational information about certain health topics that may be of help to you. Discharge Instructions       Please contact your Oncologist if you have any questions regarding the iv hydration that you received today. Patient instructed if experience any of the symptoms following today's hydration / to notify MD immediately or go to emergency department.     * dizziness/lightheadedness  *acute nausea/vomiting - not relieved with medication  *headache - not relieved from Tylenol/pain medication  *chest pain/pressure  *rash/itching  *shortness of breath        Drink fluids - 48oz fluids daily Call if develop fever/ chills/ signs or symptoms of infection    Important information for a smoker       SMOKING: QUIT SMOKING. THIS IS THE MOST IMPORTANT ACTION YOU CAN TAKE TO IMPROVE YOUR CURRENT AND FUTURE HEALTH. Call the [facility] [place] NOW ([phone])    Smoking harms nonsmokers. When nonsmokers are around people who smoke, they absorb nicotine, carbon monoxide, and other ingredients of tobacco smoke. DO NOT SMOKE AROUND CHILDREN     Children exposed to secondhand smoke are at an increased risk of:  Sudden Infant Death Syndrome (SIDS), acute respiratory infections, inflammation of the middle ear, and severe asthma. Over a longer time, it causes heart disease and lung cancer. There is no safe level of exposure to secondhand smoke. MyChart Signup     Modenus allows you to send messages to your doctor, view your test results, renew your prescriptions, schedule appointments, view visit notes, and more. How Do I Sign Up? 1. In your Internet browser, go to [email or web address]  2. Click on the Sign Up Now link in the Sign In box. You will see the New Member Sign Up page. 3. Enter your Modenus Access Code exactly as it appears below. You will not need to use this code after youve completed the sign-up process. If you do not sign up before the expiration date, you must request a new code. Modenus Access Code: [email or web address]  Expires: 11/10/2017 11:01 AM    4. Enter your Social Security Number ([id number]) and Date of Birth (mm/dd/yyyy) as indicated and click Submit. You will be taken to the next sign-up page. 5. Create a Aegis Mobilityt ID. This will be your Modenus login ID and cannot be changed, so think of one that is secure and easy to remember. 6. Create a Modenus password. You can change your password at any time. 7. Enter your Password Reset Question and Answer.  This can be used at a later time if you forget your password. 8. Enter your e-mail address. You will receive e-mail notification when new information is available in [address] 9. Click Sign Up. You can now view your medical record. Additional Information  If you have questions, please contact the physician practice where you receive care. Remember, MyChart is NOT to be used for urgent needs. For medical emergencies, dial 911. For questions regarding your MyChart account call [phone]. If you have a clinical question, please call your doctor's office. View your information online  ? Review your current list of  medications, immunization, and allergies. ? Review your future test results online . ? Review your discharge instructions provided by your caregivers at discharge    Certain functionality such as prescription refills, scheduling appointments or sending messages to your provider are not activated if your provider does not use eSeekers in his/her office    For questions regarding your MyChart account call [phone]. If you have a clinical question, please call your doctor's office. The information on all pages of the After Visit Summary has been reviewed with me, the patient and/or responsible adult, by my health care provider(s). I had the opportunity to ask questions regarding this information. I understand I should dispose of my armband safely at home to protect my health information. A complete copy of the After Visit Summary has been given to me, the patient and/or responsible adult.            Patient Signature/Responsible Adult:____________________    Clinician Signature:_____________________    Date:_____________________    Time:_____________________

## 2017-09-12 NOTE — PLAN OF CARE
Problem: Musculor/Skeletal Functional Status  Intervention: Fall precautions  Patient aware of fall precautions for here and at home -call light in reach while here    Goal: Absence of falls  Outcome: Met This Shift  No falls this admission     Problem: Intellectual/Education/Knowledge Deficit  Intervention: Verbal/written education provided  Discharge instruction sheets     Goal: Teaching initiated upon admission  Outcome: Met This Shift  Reviewed orders for hydration with patient, patient offered no questions or concerns. Patient verbalized understanding of drug being administered. Goal: Written Disposition Instruction form completed  Outcome: Met This Shift  Discharge instructions given and reviewed with patient. All questions answered. Patient verbalized understanding    Problem: Discharge Planning  Intervention: Interaction with patient/family and care team  Patient and family denies any currently needs -girl friend verbalized need for phone because hers was out of minutes call the  [facility] and was informed that they need to call Medicare and they may be eligible for a free phone  Intervention: Discharge to appropriate level of care  Discharge home with mother and girlfriend assisting in care as needed    Goal: Knowledge of discharge instructions  Knowledge of discharge instructions  Outcome: Met This Shift  Patient and family member able to teach back follow up appointments and when to call the doctor. Patient offers no questions at this time    Comments:   Care plan reviewed with patient and famiy. Patient and family verbalize understanding of the plan of care and contribute to goal setting.

## 2017-09-12 NOTE — PROGRESS NOTES
Patient assessed for the following post fluid administration:    Dizziness   No  Lightheadedness  No      Acute nausea/vomiting No  Headache   No  Chest pain/pressure  No  Rash/itching   No  Shortness of breath  No      Patient tolerated iv hydration without any complications. Last vital signs:   BP (!) 93/58  Pulse 97  Temp 98.7 °F (37.1 °C) (Oral)   Resp 18  SpO2 98%  . Patient instructed if experience any of the above symptoms following today's infusion,he is to notify MD immediately or go to the emergency department. Discharge instructions given to patient. Verbalizes understanding. Off unit via wheelchair accompanied by nurse, taken down to radation therapy with belongings.

## 2017-09-12 NOTE — IP AVS SNAPSHOT
Patient Information     Patient Name MEE Pressley 1970         This is your updated medication list to keep with you all times      ASK your doctor about these medications     albuterol sulfate  (90 Base) MCG/ACT inhaler   Commonly known as:  PROVENTIL HFA   Inhale 2 puffs into the lungs every 4 hours as needed for Wheezing or Shortness of Breath (Space out to every 6 hours as symptoms improve) Space out to every 6 hours as symptoms improve. cephALEXin 500 MG capsule   Commonly known as:  KEFLEX   Take 1 capsule by mouth 3 times daily       docusate sodium 150 MG/15ML liquid   Commonly known as:  COLACE   Take 10 mLs by mouth 3 times daily       * fentaNYL 25 MCG/HR   Commonly known as:  [address] 1 patch onto the skin every 72 hours  Epiglottic CANCER. * fentaNYL 800 MCG Liqd   Commonly known as:  SUBSYS   Place 1 spray under the tongue every 6 hours as needed for Pain . * fentaNYL 75 MCG/HR   Commonly known as:  [address] 1 patch onto the skin every 72 hours  CANCER. Handicap Placard Misc   by Does not apply route Issued disability placard from 2017 till 2018       omeprazole 2 MG/ML Susp 2 mg/mL oral suspension   Commonly known as:  PRILOSEC   Take 20 mLs by mouth Daily       ondansetron 4 MG disintegrating tablet   Commonly known as:  ZOFRAN ODT   Place 1 tablet under your tongue every 8 hours       ondansetron 4 MG tablet   Commonly known as:  ZOFRAN   Take 1 tablet by mouth every 8 hours as needed (For nausea)       potassium chloride 10 MEQ extended release capsule   Commonly known as:  MICRO-K       promethazine 6.25 MG/5ML syrup   Commonly known as:  PHENERGAN       sennosides 8.8 MG/5ML syrup   Commonly known as:  SENOKOT   Take 5 mLs by mouth daily as needed (For constipation)       * Notice: This list has 3 medication(s) that are the same as other medications prescribed for you.  Read the directions carefully, and ask your doctor or other care provider to review them with you.

## 2017-09-13 ENCOUNTER — TELEPHONE (OUTPATIENT)
Dept: FAMILY MEDICINE CLINIC | Age: 47
End: 2017-09-13

## 2017-09-13 ENCOUNTER — CLINICAL DOCUMENTATION (OUTPATIENT)
Dept: NUTRITION | Age: 47
End: 2017-09-13

## 2017-09-13 ENCOUNTER — HOSPITAL ENCOUNTER (OUTPATIENT)
Dept: INFUSION THERAPY | Age: 47
Discharge: HOME | End: 2017-09-13
Payer: MEDICARE

## 2017-09-13 ENCOUNTER — TELEPHONE (OUTPATIENT)
Dept: ONCOLOGY | Age: 47
End: 2017-09-13

## 2017-09-13 VITALS
OXYGEN SATURATION: 100 % | SYSTOLIC BLOOD PRESSURE: 110 MMHG | RESPIRATION RATE: 18 BRPM | HEART RATE: 80 BPM | TEMPERATURE: 98.2 F | DIASTOLIC BLOOD PRESSURE: 60 MMHG

## 2017-09-13 PROCEDURE — 99211 OFF/OP EST MAY X REQ PHY/QHP: CPT

## 2017-09-13 PROCEDURE — 77386 HC NTSTY MODUL RAD TX DLVR CPLX: CPT | Performed by: RADIOLOGY

## 2017-09-13 RX ORDER — FLUCONAZOLE 200 MG/1
200 TABLET ORAL DAILY
Qty: 7 TABLET | Refills: 0 | Status: SHIPPED | OUTPATIENT
Start: 2017-09-13 | End: 2017-09-20

## 2017-09-13 NOTE — PLAN OF CARE
Problem: Discharge Planning  Intervention: Interaction with patient/family and care team  Discuss understanding of discharge instructions,follow-up appointments, and when to call the physician. Goal: Knowledge of discharge instructions  Knowledge of discharge instructions   Outcome: Met This Shift  Verbalized understanding of discharge instructions, follow-up appointments, and when to call the physician. Problem: Intellectual/Education/Knowledge Deficit  Intervention: Verbal/written education provided  Patient verbalized understanding to verbal information given about thrush and how to take the medications prescribed for thrush. Goal: Teaching initiated upon admission  Outcome: Met This Shift  Patient verbalizes understanding to verbal information given on Oral thrush. Aware to call MD if develop complications. Comments:   Care plan reviewed with patient and significant other. Patient and significant other verbalize understanding of the plan of care and contribute to goal setting.

## 2017-09-13 NOTE — IP AVS SNAPSHOT
Patient Information     Patient Name MEE Cheney 1970         This is your updated medication list to keep with you all times      ASK your doctor about these medications     albuterol sulfate  (90 Base) MCG/ACT inhaler   Commonly known as:  PROVENTIL HFA   Inhale 2 puffs into the lungs every 4 hours as needed for Wheezing or Shortness of Breath (Space out to every 6 hours as symptoms improve) Space out to every 6 hours as symptoms improve. cephALEXin 500 MG capsule   Commonly known as:  KEFLEX   Take 1 capsule by mouth 3 times daily       docusate sodium 150 MG/15ML liquid   Commonly known as:  COLACE   Take 10 mLs by mouth 3 times daily       * fentaNYL 25 MCG/HR   Commonly known as:  [address] 1 patch onto the skin every 72 hours  Epiglottic CANCER. * fentaNYL 800 MCG Liqd   Commonly known as:  SUBSYS   Place 1 spray under the tongue every 6 hours as needed for Pain . * fentaNYL 75 MCG/HR   Commonly known as:  [address] 1 patch onto the skin every 72 hours  CANCER.        fluconazole 200 MG tablet   Commonly known as:  DIFLUCAN   Take 1 tablet by mouth daily for 7 days       Handicap Placard Misc   by Does not apply route Issued disability placard from 2017 till 2018       nystatin [id number] UNIT/ML suspension   Commonly known as:  MYCOSTATIN   Take 5 mLs by mouth 4 times daily       omeprazole 2 MG/ML Susp 2 mg/mL oral suspension   Commonly known as:  PRILOSEC   Take 20 mLs by mouth Daily       ondansetron 4 MG disintegrating tablet   Commonly known as:  ZOFRAN ODT   Place 1 tablet under your tongue every 8 hours       ondansetron 4 MG tablet   Commonly known as:  ZOFRAN   Take 1 tablet by mouth every 8 hours as needed (For nausea)       potassium chloride 10 MEQ extended release capsule   Commonly known as:  MICRO-K       promethazine 6.25 MG/5ML syrup   Commonly known as:  PHENERGAN       sennosides 8.8 MG/5ML syrup Commonly known as:  SENOKOT   Take 5 mLs by mouth daily as needed (For constipation)       * Notice: This list has 3 medication(s) that are the same as other medications prescribed for you. Read the directions carefully, and ask your doctor or other care provider to review them with you.

## 2017-09-13 NOTE — PROGRESS NOTES
Discharge instructions given to patient-verbalizes understanding. Off unit in wheelchair in stable condition accompanied by family.

## 2017-09-13 NOTE — IP AVS SNAPSHOT
After Visit Summary  (Discharge Instructions)    Medication List for Home    Based on the information you provided to us as well as any changes during this visit, the following is your updated medication list.  Compare this with your prescription bottles at home. If you have any questions or concerns, contact your primary care physician's office. Daily Medication List (This medication list can be shared with any healthcare provider who is helping you manage your medications)      ASK your doctor about these medications if you have questions        Last Dose    Next Dose Due AM NOON PM NIGHT    albuterol sulfate  (90 Base) MCG/ACT inhaler   Commonly known as:  PROVENTIL HFA   Inhale 2 puffs into the lungs every 4 hours as needed for Wheezing or Shortness of Breath (Space out to every 6 hours as symptoms improve) Space out to every 6 hours as symptoms improve. cephALEXin 500 MG capsule   Commonly known as:  KEFLEX   Take 1 capsule by mouth 3 times daily                                         docusate sodium 150 MG/15ML liquid   Commonly known as:  COLACE   Take 10 mLs by mouth 3 times daily                                         fentaNYL 25 MCG/HR   Commonly known as:  [address] 1 patch onto the skin every 72 hours  Epiglottic CANCER. fentaNYL 800 MCG Liqd   Commonly known as:  SUBSYS   Place 1 spray under the tongue every 6 hours as needed for Pain . fentaNYL 75 MCG/HR   Commonly known as:  [address] 1 patch onto the skin every 72 hours  CANCER.                                          fluconazole 200 MG tablet   Commonly known as:  DIFLUCAN   Take 1 tablet by mouth daily for 7 days                                         Handicap Placard Misc   by Does not apply route Issued disability placard from August 21, 2017 till February 2018 nystatin [id number] UNIT/ML suspension   Commonly known as:  MYCOSTATIN   Take 5 mLs by mouth 4 times daily                                         omeprazole 2 MG/ML Susp 2 mg/mL oral suspension   Commonly known as:  PRILOSEC   Take 20 mLs by mouth Daily                                         ondansetron 4 MG disintegrating tablet   Commonly known as:  ZOFRAN ODT   Place 1 tablet under your tongue every 8 hours                                         ondansetron 4 MG tablet   Commonly known as:  ZOFRAN   Take 1 tablet by mouth every 8 hours as needed (For nausea)                                         potassium chloride 10 MEQ extended release capsule   Commonly known as:  MICRO-K   Take 10 mEq by mouth 3 times daily                                         promethazine 6.25 MG/5ML syrup   Commonly known as:  PHENERGAN   Take 6.25 mg by mouth 4 times daily as needed for Nausea                                         sennosides 8.8 MG/5ML syrup   Commonly known as:  SENOKOT   Take 5 mLs by mouth daily as needed (For constipation)                                              Where to Get Your Medications      These medications were sent to [facility] [address], [address] [facility] [phone] -  [phone]  [address] [place] [address]     Phone:  [phone]     fluconazole 200 MG tablet    nystatin [id number] UNIT/ML suspension               Allergies as of 9/13/2017     No Known Allergies      Immunizations as of 9/13/2017     No immunizations on file. Last Vitals          Most Recent Value    Temp  98.4 °F (36.9 °C)    Pulse  82    Resp  18    BP  (!)  108/59         After Visit Summary    This summary was created for you. Thank you for entrusting your care to us.   The following information includes details about your hospital/visit stay along with steps you should take to help with your recovery once you leave the hospital.  In this packet, you will find information about the topics listed below:    · Instructions about your medications including a list of your home medications  · A summary of your hospital visit  · Follow-up appointments once you have left the hospital  · Your care plan at home      You may receive a survey regarding the care you received during your stay. Your input is valuable to us. We encourage you to complete and return your survey in the envelope provided. We hope you will choose us in the future for your healthcare needs. Patient Information     Patient Name MEE Verma 1970      Care Provided at:     Name Address Phone       [address] [phone]            Your Visit    Here you will find information about your visit, including the reason for your visit. Please take this sheet with you when you visit your doctor or other health care provider in the future. It will help determine the best possible medical care for you at that time. If you have any questions once you leave the hospital, please call the department phone number listed below. Why you were here     Your primary diagnosis was:  Not on File      Visit Information     Date & Time Department Dept. Phone    2017 [facility] [phone]       Follow-up Appointments    Below is a list of your follow-up and future appointments. This may not be a complete list as you may have made appointments directly with providers that we are not aware of or your providers may have made some for you. Please call your providers to confirm appointments. It is important to keep your appointments. Please bring your current insurance card, photo ID, co-pay, and all medication bottles to your appointment. If self-pay, payment is expected at the time of service. Follow-up Information     Follow up with Annmarie Flores. Specialty:  Infusion Therapy    Why:  12:30    Contact information:    [address], [address]  [facility] [address]  [phone]      Future Appointments     9/19/2017 1:30 PM     Appointment with Shakira Pham CNP at [address] ([phone])   Please arrive 15 minutes prior to appointment, bring photo ID and insurance card. [address] [facility] [address]  [facility] [address]       9/21/2017 1:40 PM     Appointment with Ketan King CNP at [address] [place] [address] ([phone])   Please arrive 15 minutes prior to scheduled appointment time to complete paper work, bring photo ID and insurance cards. [facility] [id number]       10/2/2017 8:45 AM     Appointment with Latanya Pirde MD at [facility]. [place] ([phone])   Please arrive 15 minutes prior to appointment, bring photo ID and insurance card. Please arrive 15 minutes prior to appointment, bring photo ID and insurance card. [address] [facility]       10/2/2017 9:45 AM     Appointment with [facility] BED 2 at [address] [place] [address] ([phone])   [address]  [address] [place] [address] Drive        Date Due    HIV screening is recommended for all people regardless of risk factors  aged 15-65 years at least once (lifetime) who have never been HIV tested. 12/14/1986    Tetanus Combination Vaccine (1 - Tdap) 12/14/1990    Pneumococcal Vaccine - Pneumovax for adults aged 19-64 years with: chronic heart disease, chronic lung disease, diabetes mellitus, alcoholism, chronic liver disease, or cigarette smoking. (1 of 1 - PPSV23) 12/14/1990    Cholesterol Screening 12/14/2011    Yearly Flu Vaccine (1) 9/1/2017                 Care Plan Once You Return Home    This section includes instructions you will need to follow once you leave the hospital.  Your care team will discuss these with you, so you and those caring for you know how to best care for your health needs at home. This section may also include educational information about certain health topics that may be of help to you. Discharge Instructions       Please contact your Oncologist if you have any questions regarding your visit       * dizziness/lightheadedness  *acute nausea/vomiting - not relieved with medication  *headache - not relieved from Tylenol/pain medication  *chest pain/pressure  *rash/itching  *shortness of breath        Drink fluids - 48oz fluids daily  Call if develop fever/ chills/ signs or symptoms of infection    Important information for a smoker       SMOKING: QUIT SMOKING. THIS IS THE MOST IMPORTANT ACTION YOU CAN TAKE TO IMPROVE YOUR CURRENT AND FUTURE HEALTH. Call the [address] [place] [address] at [employer] NOW ([phone])    Smoking harms nonsmokers. When nonsmokers are around people who smoke, they absorb nicotine, carbon monoxide, and other ingredients of tobacco smoke. DO NOT SMOKE AROUND CHILDREN     Children exposed to secondhand smoke are at an increased risk of:  Sudden Infant Death Syndrome (SIDS), acute respiratory infections, inflammation of the middle ear, and severe asthma. Over a longer time, it causes heart disease and lung cancer. There is no safe level of exposure to secondhand smoke. [employer] Signup     [employer] allows you to send messages to your doctor, view your test results, renew your prescriptions, schedule appointments, view visit notes, and more. How Do I Sign Up? 1. In your Internet browser, go to [email or web address]  2. Click on the Sign Up Now link in the Sign In box. You will see the New Member Sign Up page. 3. Enter your [employer] Access Code exactly as it appears below. You will not need to use this code after youve completed the sign-up process. If you do not sign up before the expiration date, you must request a new code.   [employer] Access Code: [email or web address]  Expires: 11/10/2017 11:01 AM 4. Enter your Social Security Number ([id number]) and Date of Birth (mm/dd/yyyy) as indicated and click Submit. You will be taken to the next sign-up page. 5. Create a [employer] ID. This will be your [employer] login ID and cannot be changed, so think of one that is secure and easy to remember. 6. Create a [employer] password. You can change your password at any time. 7. Enter your Password Reset Question and Answer. This can be used at a later time if you forget your password. 8. Enter your e-mail address. You will receive e-mail notification when new information is available in [address] 9. Click Sign Up. You can now view your medical record. Additional Information  If you have questions, please contact the physician practice where you receive care. Remember, [employer] is NOT to be used for urgent needs. For medical emergencies, dial 911. For questions regarding your [employer] account call [phone]. If you have a clinical question, please call your doctor's office. View your information online  ? Review your current list of  medications, immunization, and allergies. ? Review your future test results online . ? Review your discharge instructions provided by your caregivers at discharge    Certain functionality such as prescription refills, scheduling appointments or sending messages to your provider are not activated if your provider does not use [employer] in his/her office    For questions regarding your [employer] account call [phone]. If you have a clinical question, please call your doctor's office. The information on all pages of the After Visit Summary has been reviewed with me, the patient and/or responsible adult, by my health care provider(s). I had the opportunity to ask questions regarding this information. I understand I should dispose of my armband safely at home to protect my health information.  A complete copy of the After Visit Summary

## 2017-09-14 PROCEDURE — 77386 HC NTSTY MODUL RAD TX DLVR CPLX: CPT

## 2017-09-15 ENCOUNTER — HOSPITAL ENCOUNTER (OUTPATIENT)
Dept: INFUSION THERAPY | Age: 47
Discharge: HOME | End: 2017-09-15
Payer: MEDICARE

## 2017-09-15 VITALS
TEMPERATURE: 98.7 F | SYSTOLIC BLOOD PRESSURE: 96 MMHG | OXYGEN SATURATION: 97 % | RESPIRATION RATE: 18 BRPM | DIASTOLIC BLOOD PRESSURE: 68 MMHG | WEIGHT: 118 LBS | BODY MASS INDEX: 16.89 KG/M2 | HEIGHT: 70 IN | HEART RATE: 98 BPM

## 2017-09-15 DIAGNOSIS — J43.9 PULMONARY EMPHYSEMA, UNSPECIFIED EMPHYSEMA TYPE (HCC): ICD-10-CM

## 2017-09-15 DIAGNOSIS — C76.0 RECENT DIAGNOSIS OF HEAD AND NECK CANCER (HCC): Primary | ICD-10-CM

## 2017-09-15 DIAGNOSIS — C76.0 RECENT DIAGNOSIS OF HEAD AND NECK CANCER (HCC): ICD-10-CM

## 2017-09-15 DIAGNOSIS — F17.219 CIGARETTE NICOTINE DEPENDENCE WITH NICOTINE-INDUCED DISORDER: ICD-10-CM

## 2017-09-15 DIAGNOSIS — R04.2 COUGH WITH HEMOPTYSIS: ICD-10-CM

## 2017-09-15 DIAGNOSIS — R91.8 PULMONARY INFILTRATES: ICD-10-CM

## 2017-09-15 DIAGNOSIS — F17.200 SMOKER: ICD-10-CM

## 2017-09-15 DIAGNOSIS — J44.9 CHRONIC OBSTRUCTIVE PULMONARY DISEASE, UNSPECIFIED COPD TYPE (HCC): ICD-10-CM

## 2017-09-15 DIAGNOSIS — D62 ANEMIA ASSOCIATED WITH ACUTE BLOOD LOSS: ICD-10-CM

## 2017-09-15 DIAGNOSIS — F10.930 ALCOHOL WITHDRAWAL SYNDROME WITHOUT COMPLICATION (HCC): ICD-10-CM

## 2017-09-15 DIAGNOSIS — D66 FACTOR VIII DEFICIENCY (HCC): ICD-10-CM

## 2017-09-15 DIAGNOSIS — F10.11 HISTORY OF ALCOHOL ABUSE: ICD-10-CM

## 2017-09-15 LAB
BUN, WHOLE BLOOD: 14 MG/DL (ref 8–26)
CHLORIDE, WHOLE BLOOD: 95 MEQ/L (ref 98–109)
CREATININE, WHOLE BLOOD: 0.6 MG/DL (ref 0.5–1.2)
GFR, ESTIMATED: > 90 ML/MIN/1.73M2
GLUCOSE, WHOLE BLOOD: 102 MG/DL (ref 70–108)
IONIZED CALCIUM, WHOLE BLOOD: 1.13 MMOL/L (ref 1.12–1.32)
POTASSIUM, WHOLE BLOOD: 3 MEQ/L (ref 3.5–4.9)
SODIUM, WHOLE BLOOD: 136 MEQ/L (ref 138–146)
TOTAL CO2, WHOLE BLOOD: 29 MEQ/L (ref 23–33)

## 2017-09-15 PROCEDURE — 77386 HC NTSTY MODUL RAD TX DLVR CPLX: CPT | Performed by: RADIOLOGY

## 2017-09-15 PROCEDURE — 96365 THER/PROPH/DIAG IV INF INIT: CPT

## 2017-09-15 PROCEDURE — 2580000003 HC RX 258: Performed by: INTERNAL MEDICINE

## 2017-09-15 PROCEDURE — 6360000002 HC RX W HCPCS: Performed by: INTERNAL MEDICINE

## 2017-09-15 PROCEDURE — 80047 BASIC METABLC PNL IONIZED CA: CPT

## 2017-09-15 RX ORDER — SODIUM CHLORIDE 0.9 % (FLUSH) 0.9 %
20 SYRINGE (ML) INJECTION PRN
Status: CANCELLED | OUTPATIENT
Start: 2017-09-15

## 2017-09-15 RX ORDER — HEPARIN SODIUM (PORCINE) LOCK FLUSH IV SOLN 100 UNIT/ML 100 UNIT/ML
500 SOLUTION INTRAVENOUS PRN
Status: CANCELLED | OUTPATIENT
Start: 2017-09-15

## 2017-09-15 RX ORDER — SODIUM CHLORIDE 0.9 % (FLUSH) 0.9 %
10 SYRINGE (ML) INJECTION PRN
Status: CANCELLED | OUTPATIENT
Start: 2017-09-15

## 2017-09-15 RX ADMIN — POTASSIUM CHLORIDE: 2 INJECTION, SOLUTION, CONCENTRATE INTRAVENOUS at 14:35

## 2017-09-15 NOTE — PLAN OF CARE
Problem: Intellectual/Education/Knowledge Deficit  Intervention: Verbal/written education provided  Patient verbalized understanding of signs and symptoms of dehydration and when to call the physician. Patient received . 9NS 500ml with 10meq of potassium infusing over 1hour. No po fluid taken. Goal: Teaching initiated upon admission  Outcome: Met This Shift  Patient verbalizes understanding to verbal information given on signs and symptoms of dehydration,action and possible side effects. Aware to call MD if develop complications. Problem: Discharge Planning  Intervention: Interaction with patient/family and care team  Provide discharge instructions. Goal: Knowledge of discharge instructions  Knowledge of discharge instructions   Outcome: Met This Shift  Verbalized understanding of discharge instructions, follow-up appointments, and when to call the physician. Comments:   Care plan reviewed with patient and friend. Patient and friend verbalize understanding of the plan of care and contribute to goal setting.

## 2017-09-15 NOTE — PROGRESS NOTES
Patient assessed for the following post hydration:    Dizziness   No  Lightheadedness  No      Acute nausea/vomiting No  Headache   No  Chest pain/pressure  No  Rash/itching   No  Shortness of breath  No    Patient tolerated . 9NS 500ml with 10meq of potassium over 1hour without any complications. Last vital signs:   BP 96/68  Pulse 98  Temp 98.7 °F (37.1 °C) (Oral)   Resp 18  SpO2 97%    Patient instructed if experience any of the above symptoms following today's infusion,he/she is to notify MD immediately or go to the emergency department. Discharge instructions given to patient. Verbalizes understanding. Ambulated off unit per wheelchair with friend with belongings.

## 2017-09-15 NOTE — IP AVS SNAPSHOT
Patient Information     Patient Name MEE Gayle 1970         This is your updated medication list to keep with you all times      ASK your doctor about these medications     albuterol sulfate  (90 Base) MCG/ACT inhaler   Commonly known as:  PROVENTIL HFA   Inhale 2 puffs into the lungs every 4 hours as needed for Wheezing or Shortness of Breath (Space out to every 6 hours as symptoms improve) Space out to every 6 hours as symptoms improve. cephALEXin 500 MG capsule   Commonly known as:  KEFLEX   Take 1 capsule by mouth 3 times daily       docusate sodium 150 MG/15ML liquid   Commonly known as:  COLACE   Take 10 mLs by mouth 3 times daily       * fentaNYL 25 MCG/HR   Commonly known as:  [address] 1 patch onto the skin every 72 hours  Epiglottic CANCER. * fentaNYL 800 MCG Liqd   Commonly known as:  SUBSYS   Place 1 spray under the tongue every 6 hours as needed for Pain . * fentaNYL 75 MCG/HR   Commonly known as:  [address] 1 patch onto the skin every 72 hours  CANCER.        fluconazole 200 MG tablet   Commonly known as:  DIFLUCAN   Take 1 tablet by mouth daily for 7 days       Handicap Placard Misc   by Does not apply route Issued disability placard from 2017 till 2018       nystatin [id number] UNIT/ML suspension   Commonly known as:  MYCOSTATIN   Take 5 mLs by mouth 4 times daily       omeprazole 2 MG/ML Susp 2 mg/mL oral suspension   Commonly known as:  PRILOSEC   Take 20 mLs by mouth Daily       ondansetron 4 MG disintegrating tablet   Commonly known as:  ZOFRAN ODT   Place 1 tablet under your tongue every 8 hours       ondansetron 4 MG tablet   Commonly known as:  ZOFRAN   Take 1 tablet by mouth every 8 hours as needed (For nausea)       potassium chloride 10 MEQ extended release capsule   Commonly known as:  MICRO-K       promethazine 6.25 MG/5ML syrup   Commonly known as:  PHENERGAN       sennosides 8.8 MG/5ML syrup Commonly known as:  SENOKOT   Take 5 mLs by mouth daily as needed (For constipation)       * Notice: This list has 3 medication(s) that are the same as other medications prescribed for you. Read the directions carefully, and ask your doctor or other care provider to review them with you.

## 2017-09-15 NOTE — IP AVS SNAPSHOT
After Visit Summary  (Discharge Instructions)    Medication List for Home    Based on the information you provided to us as well as any changes during this visit, the following is your updated medication list.  Compare this with your prescription bottles at home. If you have any questions or concerns, contact your primary care physician's office. Daily Medication List (This medication list can be shared with any healthcare provider who is helping you manage your medications)      ASK your doctor about these medications if you have questions        Last Dose    Next Dose Due AM NOON PM NIGHT    albuterol sulfate  (90 Base) MCG/ACT inhaler   Commonly known as:  PROVENTIL HFA   Inhale 2 puffs into the lungs every 4 hours as needed for Wheezing or Shortness of Breath (Space out to every 6 hours as symptoms improve) Space out to every 6 hours as symptoms improve. cephALEXin 500 MG capsule   Commonly known as:  KEFLEX   Take 1 capsule by mouth 3 times daily                                         docusate sodium 150 MG/15ML liquid   Commonly known as:  COLACE   Take 10 mLs by mouth 3 times daily                                         fentaNYL 25 MCG/HR   Commonly known as:  Piilostentie 53 1 patch onto the skin every 72 hours  Epiglottic CANCER. fentaNYL 800 MCG Liqd   Commonly known as:  SUBSYS   Place 1 spray under the tongue every 6 hours as needed for Pain . fentaNYL 75 MCG/HR   Commonly known as:  Piilostentie 53 1 patch onto the skin every 72 hours  CANCER.                                          fluconazole 200 MG tablet   Commonly known as:  DIFLUCAN   Take 1 tablet by mouth daily for 7 days                                         Handicap Placard Misc   by Does not apply route Issued disability placard from August 21, 2017 till February 2018 nystatin 988750 UNIT/ML suspension   Commonly known as:  MYCOSTATIN   Take 5 mLs by mouth 4 times daily                                         omeprazole 2 MG/ML Susp 2 mg/mL oral suspension   Commonly known as:  PRILOSEC   Take 20 mLs by mouth Daily                                         ondansetron 4 MG disintegrating tablet   Commonly known as:  ZOFRAN ODT   Place 1 tablet under your tongue every 8 hours                                         ondansetron 4 MG tablet   Commonly known as:  ZOFRAN   Take 1 tablet by mouth every 8 hours as needed (For nausea)                                         potassium chloride 10 MEQ extended release capsule   Commonly known as:  MICRO-K   Take 10 mEq by mouth 3 times daily                                         promethazine 6.25 MG/5ML syrup   Commonly known as:  PHENERGAN   Take 6.25 mg by mouth 4 times daily as needed for Nausea                                         sennosides 8.8 MG/5ML syrup   Commonly known as:  SENOKOT   Take 5 mLs by mouth daily as needed (For constipation)                                                 Allergies as of 9/15/2017     No Known Allergies      Immunizations as of 9/15/2017     No immunizations on file. Last Vitals          Most Recent Value    Temp  98.6 °F (37 °C)    Pulse  116    Resp  20    BP  98/67         After Visit Summary    This summary was created for you. Thank you for entrusting your care to us.   The following information includes details about your hospital/visit stay along with steps you should take to help with your recovery once you leave the hospital.  In this packet, you will find information about the topics listed below:    · Instructions about your medications including a list of your home medications  · A summary of your hospital visit  · Follow-up appointments once you have left the hospital  · Your care plan at home You may receive a survey regarding the care you received during your stay. Your input is valuable to us. We encourage you to complete and return your survey in the envelope provided. We hope you will choose us in the future for your healthcare needs. Patient Information     Patient Name MEE Dunbar 1970      Care Provided at:     Name Address Phone       [address] [phone]            Your Visit    Here you will find information about your visit, including the reason for your visit. Please take this sheet with you when you visit your doctor or other health care provider in the future. It will help determine the best possible medical care for you at that time. If you have any questions once you leave the hospital, please call the department phone number listed below. Why you were here     Your primary diagnosis was:  Not on File      Visit Information     Date & Time Department Dept. Phone    9/15/2017 [facility] [phone]       Follow-up Appointments    Below is a list of your follow-up and future appointments. This may not be a complete list as you may have made appointments directly with providers that we are not aware of or your providers may have made some for you. Please call your providers to confirm appointments. It is important to keep your appointments. Please bring your current insurance card, photo ID, co-pay, and all medication bottles to your appointment. If self-pay, payment is expected at the time of service. Future Appointments     2017 1:30 PM     Appointment with STR OUT PT [facility] 09 at 100 [facility] ([phone])   [address] [facility], [address]  [facility]       2017 1:30 PM     Appointment with Shakira Pham CNP at [address] [facility] ([phone])   Please arrive 15 minutes prior to appointment, bring photo ID and insurance card. [facility] [address]  [facility] [address]       9/20/2017 1:30 PM     Appointment with STR OUT PT ONC INJ RM 06 at 100 [facility] ([phone])   [address] [facility], [address]  [address]       9/21/2017 1:40 PM     Appointment with Jacqui Escobar CNP at [address] [facility] ([phone])   Please arrive 15 minutes prior to scheduled appointment time to complete paper work, bring photo ID and insurance cards. [facility] [phone]       10/2/2017 8:45 AM     Appointment with Jerry Younger MD at [facility] [address] [facility] ([phone])   Please arrive 15 minutes prior to appointment, bring photo ID and insurance card. Please arrive 15 minutes prior to appointment, bring photo ID and insurance card. [address] [place] [address]       10/2/2017 9:45 AM     Appointment with STR OUT PT ONC BED 2 at 100 [facility] ([phone])   [address] [place] [address], [address]  [address]        Date Due    HIV screening is recommended for all people regardless of risk factors  aged 15-65 years at least once (lifetime) who have never been HIV tested. 12/14/1986    Tetanus Combination Vaccine (1 - Tdap) 12/14/1990    Pneumococcal Vaccine - Pneumovax for adults aged 19-64 years with: chronic heart disease, chronic lung disease, diabetes mellitus, alcoholism, chronic liver disease, or cigarette smoking. (1 of 1 - PPSV23) 12/14/1990    Cholesterol Screening 12/14/2011    Yearly Flu Vaccine (1) 9/1/2017                 Care Plan Once You Return Home    This section includes instructions you will need to follow once you leave the hospital.  Your care team will discuss these with you, so you and those caring for you know how to best care for your health needs at home. This section may also include educational information about certain health topics that may be of help to you.           Discharge Instructions Surv[employer] Access Code: [email or web address]  Expires: 11/10/2017 11:01 AM    4. Enter your Social Security Number ([id number]) and Date of Birth (mm/dd/yyyy) as indicated and click Submit. You will be taken to the next sign-up page. 5. Create a [employer] ID. This will be your Survios login ID and cannot be changed, so think of one that is secure and easy to remember. 6. Create a Survios password. You can change your password at any time. 7. Enter your Password Reset Question and Answer. This can be used at a later time if you forget your password. 8. Enter your e-mail address. You will receive e-mail notification when new information is available in [address] 9. Click Sign Up. You can now view your medical record. Additional Information  If you have questions, please contact the physician practice where you receive care. Remember, [employer] is NOT to be used for urgent needs. For medical emergencies, dial 911. For questions regarding your [employer] account call [phone]. If you have a clinical question, please call your doctor's office. View your information online  ? Review your current list of  medications, immunization, and allergies. ? Review your future test results online . ? Review your discharge instructions provided by your caregivers at discharge    Certain functionality such as prescription refills, scheduling appointments or sending messages to your provider are not activated if your provider does not use [employer] in his/her office    For questions regarding your [employer] account call [phone]. If you have a clinical question, please call your doctor's office. The information on all pages of the After Visit Summary has been reviewed with me, the patient and/or responsible adult, by my health care provider(s). I had the opportunity to ask questions regarding this information.  I understand I should dispose of my armband safely at home to protect my health information. A complete copy of the After Visit Summary has been given to me, the patient and/or responsible adult.            Patient Signature/Responsible Adult:____________________    Clinician Signature:_____________________    Date:_____________________    Time:_____________________

## 2017-09-18 ENCOUNTER — HOSPITAL ENCOUNTER (OUTPATIENT)
Dept: INFUSION THERAPY | Age: 47
Discharge: HOME | End: 2017-09-18
Payer: MEDICARE

## 2017-09-18 ENCOUNTER — HOSPITAL ENCOUNTER (OUTPATIENT)
Dept: RADIATION ONCOLOGY | Age: 47
Discharge: HOME | End: 2017-09-18
Payer: MEDICARE

## 2017-09-18 DIAGNOSIS — C76.0 RECENT DIAGNOSIS OF HEAD AND NECK CANCER (HCC): ICD-10-CM

## 2017-09-18 LAB
BUN, WHOLE BLOOD: 13 MG/DL (ref 8–26)
CHLORIDE, WHOLE BLOOD: 97 MEQ/L (ref 98–109)
CREATININE, WHOLE BLOOD: 0.7 MG/DL (ref 0.5–1.2)
GFR, ESTIMATED: > 90 ML/MIN/1.73M2
GLUCOSE, WHOLE BLOOD: 86 MG/DL (ref 70–108)
IONIZED CALCIUM, WHOLE BLOOD: 1.2 MMOL/L (ref 1.12–1.32)
POTASSIUM, WHOLE BLOOD: 3.8 MEQ/L (ref 3.5–4.9)
SODIUM, WHOLE BLOOD: 138 MEQ/L (ref 138–146)
TOTAL CO2, WHOLE BLOOD: 29 MEQ/L (ref 23–33)

## 2017-09-18 PROCEDURE — 77386 HC NTSTY MODUL RAD TX DLVR CPLX: CPT | Performed by: RADIOLOGY

## 2017-09-18 PROCEDURE — 80047 BASIC METABLC PNL IONIZED CA: CPT

## 2017-09-18 PROCEDURE — 77336 RADIATION PHYSICS CONSULT: CPT | Performed by: RADIOLOGY

## 2017-09-18 PROCEDURE — 99211 OFF/OP EST MAY X REQ PHY/QHP: CPT

## 2017-09-18 RX ORDER — SODIUM CHLORIDE 0.9 % (FLUSH) 0.9 %
10 SYRINGE (ML) INJECTION PRN
Status: CANCELLED | OUTPATIENT
Start: 2017-09-18

## 2017-09-18 RX ORDER — SODIUM CHLORIDE 0.9 % (FLUSH) 0.9 %
20 SYRINGE (ML) INJECTION PRN
Status: CANCELLED | OUTPATIENT
Start: 2017-09-18

## 2017-09-18 RX ORDER — 0.9 % SODIUM CHLORIDE 0.9 %
500 INTRAVENOUS SOLUTION INTRAVENOUS ONCE
Status: CANCELLED | OUTPATIENT
Start: 2017-09-18 | End: 2017-09-18

## 2017-09-18 RX ORDER — HEPARIN SODIUM (PORCINE) LOCK FLUSH IV SOLN 100 UNIT/ML 100 UNIT/ML
500 SOLUTION INTRAVENOUS PRN
Status: CANCELLED | OUTPATIENT
Start: 2017-09-18

## 2017-09-18 NOTE — PROGRESS NOTES
Patient assessed for the following post lab draw:    Dizziness   No  Lightheadedness             No     Acute nausea/vomiting No  Headache   No  Chest pain/pressure  No  Rash/itching   No  Shortness of breath  No    Patient tolerated lab draw without any complications. INT placed before patient went to radiation therapy. Patient returned from radiation and refused to stay for an hour of hydration. He states \"i want to go home\". Last vital signs: There were no vitals taken for this visit. Attending physician notified of patient refusing hydration, orders received: No    Patient instructed if experience any of the above symptoms following today's lab draw,he/she is to notify MD immediately or go to the emergency department. Discharge instructions given to patient. Verbalizes understanding. Ambulated off unit per wheelchair with family with belongings.

## 2017-09-18 NOTE — IP AVS SNAPSHOT
nystatin [id number] UNIT/ML suspension   Commonly known as:  MYCOSTATIN   Take 5 mLs by mouth 4 times daily                                         omeprazole 2 MG/ML Susp 2 mg/mL oral suspension   Commonly known as:  PRILOSEC   Take 20 mLs by mouth Daily                                         ondansetron 4 MG disintegrating tablet   Commonly known as:  ZOFRAN ODT   Place 1 tablet under your tongue every 8 hours                                         ondansetron 4 MG tablet   Commonly known as:  ZOFRAN   Take 1 tablet by mouth every 8 hours as needed (For nausea)                                         potassium chloride 10 MEQ extended release capsule   Commonly known as:  MICRO-K   Take 10 mEq by mouth 3 times daily                                         promethazine 6.25 MG/5ML syrup   Commonly known as:  PHENERGAN   Take 6.25 mg by mouth 4 times daily as needed for Nausea                                         sennosides 8.8 MG/5ML syrup   Commonly known as:  SENOKOT   Take 5 mLs by mouth daily as needed (For constipation)                                                 Allergies as of 9/18/2017     No Known Allergies      Immunizations as of 9/18/2017     No immunizations on file. After Visit Summary    This summary was created for you. Thank you for entrusting your care to us. The following information includes details about your hospital/visit stay along with steps you should take to help with your recovery once you leave the hospital.  In this packet, you will find information about the topics listed below:    · Instructions about your medications including a list of your home medications  · A summary of your hospital visit  · Follow-up appointments once you have left the hospital  · Your care plan at home      You may receive a survey regarding the care you received during your stay. Your input is valuable to us.  We encourage you to complete and return your survey in the envelope provided. We hope you will choose us in the future for your healthcare needs. Patient Information     Patient Name MEE [facility] 1970      Care Provided at:     Name Address Phone       [address] [facility] [address] [facility] [address] [phone]            Your Visit    Here you will find information about your visit, including the reason for your visit. Please take this sheet with you when you visit your doctor or other health care provider in the future. It will help determine the best possible medical care for you at that time. If you have any questions once you leave the hospital, please call the department phone number listed below. Why you were here     Your primary diagnosis was:  Not on File      Visit Information     Date & Time Department Dept. Phone    2017 [facility] [phone]       Follow-up Appointments    Below is a list of your follow-up and future appointments. This may not be a complete list as you may have made appointments directly with providers that we are not aware of or your providers may have made some for you. Please call your providers to confirm appointments. It is important to keep your appointments. Please bring your current insurance card, photo ID, co-pay, and all medication bottles to your appointment. If self-pay, payment is expected at the time of service. Future Appointments     2017 1:30 PM     Appointment with Aguila Castle CNP at [facility] [facility] ([phone])   Please arrive 15 minutes prior to appointment, bring photo ID and insurance card. [address] [facility] [address]  [facility] [address]       2017 1:30 PM     Appointment with STR OUT PT ONC INJ RM 06 at 100 [facility] ([phone])   [address] [facility], [address]  [place] [address]       2017 1:40 PM     Appointment with Leander Eisenmenger, CNP at [address] [facility] ([phone]) Please arrive 15 minutes prior to scheduled appointment time to complete paper work, bring photo ID and insurance cards. [place] [phone]       10/2/2017 8:45 AM     Appointment with So Hardy MD at [facility] [place] ([phone])   Please arrive 15 minutes prior to appointment, bring photo ID and insurance card. Please arrive 15 minutes prior to appointment, bring photo ID and insurance card. [address] [facility]       10/2/2017 9:45 AM     Appointment with STR OUT PT ONC BED 2 at [address] [place] [address] ([phone])   [address] [place] [address], [address]  [address] [facility] [address]        Date Due    HIV screening is recommended for all people regardless of risk factors  aged 15-65 years at least once (lifetime) who have never been HIV tested. 12/14/1986    Tetanus Combination Vaccine (1 - Tdap) 12/14/1990    Pneumococcal Vaccine - Pneumovax for adults aged 19-64 years with: chronic heart disease, chronic lung disease, diabetes mellitus, alcoholism, chronic liver disease, or cigarette smoking. (1 of 1 - PPSV23) 12/14/1990    Cholesterol Screening 12/14/2011    Yearly Flu Vaccine (1) 9/1/2017                 Care Plan Once You Return Home    This section includes instructions you will need to follow once you leave the hospital.  Your care team will discuss these with you, so you and those caring for you know how to best care for your health needs at home. This section may also include educational information about certain health topics that may be of help to you. Discharge Instructions       Please contact your Oncologist if you have any questions regarding the lab draw that you received today. Patient instructed if experience any of the symptoms following today's lab draw / to notify MD immediately or go to emergency department.     * dizziness/lightheadedness  *acute nausea/vomiting - not relieved with medication *headache - not relieved from Tylenol/pain medication  *chest pain/pressure  *rash/itching  *shortness of breath        Drink fluids - 48oz fluids daily  Call if develop fever/ chills/ signs or symptoms of infection    Important information for a smoker       SMOKING: QUIT SMOKING. THIS IS THE MOST IMPORTANT ACTION YOU CAN TAKE TO IMPROVE YOUR CURRENT AND FUTURE HEALTH. Call the [facility] [place] NOW ([phone])    Smoking harms nonsmokers. When nonsmokers are around people who smoke, they absorb nicotine, carbon monoxide, and other ingredients of tobacco smoke. DO NOT SMOKE AROUND CHILDREN     Children exposed to secondhand smoke are at an increased risk of:  Sudden Infant Death Syndrome (SIDS), acute respiratory infections, inflammation of the middle ear, and severe asthma. Over a longer time, it causes heart disease and lung cancer. There is no safe level of exposure to secondhand smoke. [employer] Signup     [employer] allows you to send messages to your doctor, view your test results, renew your prescriptions, schedule appointments, view visit notes, and more. How Do I Sign Up? 1. In your Internet browser, go to [email or web address]  2. Click on the Sign Up Now link in the Sign In box. You will see the New Member Sign Up page. 3. Enter your [employer] Access Code exactly as it appears below. You will not need to use this code after youve completed the sign-up process. If you do not sign up before the expiration date, you must request a new code. [employer] Access Code: [email or web address]  Expires: 11/10/2017 11:01 AM    4. Enter your Social Security Number (xxx-[id number]) and Date of Birth (mm/dd/yyyy) as indicated and click Submit. You will be taken to the next sign-up page. 5. Create a [employer] ID. This will be your [employer] login ID and cannot be changed, so think of one that is secure and easy to remember. 6. Create a [employer] password. You can change your password at any time. 7. Enter your Password Reset Question and Answer. This can be used at a later time if you forget your password. 8. Enter your e-mail address. You will receive e-mail notification when new information is available in [address] 9. Click Sign Up. You can now view your medical record. Additional Information  If you have questions, please contact the physician practice where you receive care. Remember, [employer] is NOT to be used for urgent needs. For medical emergencies, dial 911. For questions regarding your [employer] account call [phone]. If you have a clinical question, please call your doctor's office. View your information online  ? Review your current list of  medications, immunization, and allergies. ? Review your future test results online . ? Review your discharge instructions provided by your caregivers at discharge    Certain functionality such as prescription refills, scheduling appointments or sending messages to your provider are not activated if your provider does not use [employer] in his/her office    For questions regarding your [employer] account call [phone]. If you have a clinical question, please call your doctor's office. The information on all pages of the After Visit Summary has been reviewed with me, the patient and/or responsible adult, by my health care provider(s). I had the opportunity to ask questions regarding this information. I understand I should dispose of my armband safely at home to protect my health information. A complete copy of the After Visit Summary has been given to me, the patient and/or responsible adult.            Patient Signature/Responsible Adult:____________________    Clinician Signature:_____________________    Date:_____________________    Time:_____________________

## 2017-09-18 NOTE — IP AVS SNAPSHOT
Patient Information     Patient Name MEE Hernandez 1970         This is your updated medication list to keep with you all times      ASK your doctor about these medications     albuterol sulfate  (90 Base) MCG/ACT inhaler   Commonly known as:  PROVENTIL HFA   Inhale 2 puffs into the lungs every 4 hours as needed for Wheezing or Shortness of Breath (Space out to every 6 hours as symptoms improve) Space out to every 6 hours as symptoms improve. cephALEXin 500 MG capsule   Commonly known as:  KEFLEX   Take 1 capsule by mouth 3 times daily       docusate sodium 150 MG/15ML liquid   Commonly known as:  COLACE   Take 10 mLs by mouth 3 times daily       * fentaNYL 25 MCG/HR   Commonly known as:  [address] 1 patch onto the skin every 72 hours  Epiglottic CANCER. * fentaNYL 800 MCG Liqd   Commonly known as:  SUBSYS   Place 1 spray under the tongue every 6 hours as needed for Pain . * fentaNYL 75 MCG/HR   Commonly known as:  [address] 1 patch onto the skin every 72 hours  CANCER.        fluconazole 200 MG tablet   Commonly known as:  DIFLUCAN   Take 1 tablet by mouth daily for 7 days       Handicap Placard Misc   by Does not apply route Issued disability placard from 2017 till 2018       nystatin [id number] UNIT/ML suspension   Commonly known as:  MYCOSTATIN   Take 5 mLs by mouth 4 times daily       omeprazole 2 MG/ML Susp 2 mg/mL oral suspension   Commonly known as:  PRILOSEC   Take 20 mLs by mouth Daily       ondansetron 4 MG disintegrating tablet   Commonly known as:  ZOFRAN ODT   Place 1 tablet under your tongue every 8 hours       ondansetron 4 MG tablet   Commonly known as:  ZOFRAN   Take 1 tablet by mouth every 8 hours as needed (For nausea)       potassium chloride 10 MEQ extended release capsule   Commonly known as:  MICRO-K       promethazine 6.25 MG/5ML syrup   Commonly known as:  PHENERGAN       sennosides 8.8 MG/5ML syrup Commonly known as:  SENOKOT   Take 5 mLs by mouth daily as needed (For constipation)       * Notice: This list has 3 medication(s) that are the same as other medications prescribed for you. Read the directions carefully, and ask your doctor or other care provider to review them with you.

## 2017-09-19 DIAGNOSIS — C76.0 RECENT DIAGNOSIS OF HEAD AND NECK CANCER (HCC): Primary | ICD-10-CM

## 2017-09-20 ENCOUNTER — HOSPITAL ENCOUNTER (OUTPATIENT)
Dept: INFUSION THERAPY | Age: 47
End: 2017-09-20
Payer: MEDICARE

## 2017-09-20 PROCEDURE — 77386 HC NTSTY MODUL RAD TX DLVR CPLX: CPT | Performed by: RADIOLOGY

## 2017-09-21 ENCOUNTER — TELEPHONE (OUTPATIENT)
Dept: FAMILY MEDICINE CLINIC | Age: 47
End: 2017-09-21

## 2017-09-21 PROCEDURE — 77386 HC NTSTY MODUL RAD TX DLVR CPLX: CPT | Performed by: RADIOLOGY

## 2017-09-21 RX ORDER — TRAZODONE HYDROCHLORIDE 50 MG/1
50 TABLET ORAL NIGHTLY
Qty: 30 TABLET | Refills: 0 | Status: SHIPPED | OUTPATIENT
Start: 2017-09-21 | End: 2017-11-20 | Stop reason: SDUPTHER

## 2017-09-22 ENCOUNTER — HOSPITAL ENCOUNTER (OUTPATIENT)
Dept: INFUSION THERAPY | Age: 47
Discharge: HOME | End: 2017-09-22
Payer: MEDICARE

## 2017-09-22 VITALS
BODY MASS INDEX: 16.21 KG/M2 | RESPIRATION RATE: 18 BRPM | TEMPERATURE: 100 F | WEIGHT: 113 LBS | OXYGEN SATURATION: 93 % | SYSTOLIC BLOOD PRESSURE: 90 MMHG | HEART RATE: 128 BPM | DIASTOLIC BLOOD PRESSURE: 78 MMHG

## 2017-09-22 DIAGNOSIS — F10.930 ALCOHOL WITHDRAWAL SYNDROME WITHOUT COMPLICATION (HCC): ICD-10-CM

## 2017-09-22 DIAGNOSIS — R91.8 PULMONARY INFILTRATES: ICD-10-CM

## 2017-09-22 DIAGNOSIS — F17.200 SMOKER: ICD-10-CM

## 2017-09-22 DIAGNOSIS — F10.11 HISTORY OF ALCOHOL ABUSE: ICD-10-CM

## 2017-09-22 DIAGNOSIS — R53.81 PHYSICAL DECONDITIONING: ICD-10-CM

## 2017-09-22 DIAGNOSIS — J44.9 CHRONIC OBSTRUCTIVE PULMONARY DISEASE, UNSPECIFIED COPD TYPE (HCC): ICD-10-CM

## 2017-09-22 DIAGNOSIS — J43.9 PULMONARY EMPHYSEMA, UNSPECIFIED EMPHYSEMA TYPE (HCC): ICD-10-CM

## 2017-09-22 DIAGNOSIS — Z43.0 TRACHEOSTOMY CARE (HCC): Primary | ICD-10-CM

## 2017-09-22 DIAGNOSIS — R04.2 COUGH WITH HEMOPTYSIS: ICD-10-CM

## 2017-09-22 DIAGNOSIS — D66 FACTOR VIII DEFICIENCY (HCC): ICD-10-CM

## 2017-09-22 DIAGNOSIS — C76.0 RECENT DIAGNOSIS OF HEAD AND NECK CANCER (HCC): ICD-10-CM

## 2017-09-22 DIAGNOSIS — D62 ANEMIA ASSOCIATED WITH ACUTE BLOOD LOSS: ICD-10-CM

## 2017-09-22 DIAGNOSIS — F17.219 CIGARETTE NICOTINE DEPENDENCE WITH NICOTINE-INDUCED DISORDER: ICD-10-CM

## 2017-09-22 DIAGNOSIS — C32.9 LARYNX CANCER (HCC): ICD-10-CM

## 2017-09-22 PROCEDURE — 2580000003 HC RX 258: Performed by: INTERNAL MEDICINE

## 2017-09-22 PROCEDURE — 96360 HYDRATION IV INFUSION INIT: CPT

## 2017-09-22 PROCEDURE — 77386 HC NTSTY MODUL RAD TX DLVR CPLX: CPT | Performed by: RADIOLOGY

## 2017-09-22 PROCEDURE — G0463 HOSPITAL OUTPT CLINIC VISIT: HCPCS

## 2017-09-22 RX ORDER — SODIUM CHLORIDE 0.9 % (FLUSH) 0.9 %
10 SYRINGE (ML) INJECTION PRN
Status: CANCELLED | OUTPATIENT
Start: 2017-09-22

## 2017-09-22 RX ORDER — SODIUM CHLORIDE 0.9 % (FLUSH) 0.9 %
20 SYRINGE (ML) INJECTION PRN
Status: CANCELLED | OUTPATIENT
Start: 2017-09-22

## 2017-09-22 RX ORDER — 0.9 % SODIUM CHLORIDE 0.9 %
500 INTRAVENOUS SOLUTION INTRAVENOUS ONCE
Status: COMPLETED | OUTPATIENT
Start: 2017-09-22 | End: 2017-09-22

## 2017-09-22 RX ORDER — 0.9 % SODIUM CHLORIDE 0.9 %
500 INTRAVENOUS SOLUTION INTRAVENOUS ONCE
Status: CANCELLED | OUTPATIENT
Start: 2017-09-22 | End: 2017-09-22

## 2017-09-22 RX ORDER — HEPARIN SODIUM (PORCINE) LOCK FLUSH IV SOLN 100 UNIT/ML 100 UNIT/ML
500 SOLUTION INTRAVENOUS PRN
Status: CANCELLED | OUTPATIENT
Start: 2017-09-22

## 2017-09-22 RX ADMIN — SODIUM CHLORIDE 500 ML: 9 INJECTION, SOLUTION INTRAVENOUS at 14:35

## 2017-09-22 ASSESSMENT — PAIN SCALES - GENERAL: PAINLEVEL_OUTOF10: 0

## 2017-09-22 NOTE — IP AVS SNAPSHOT
After Visit Summary  (Discharge Instructions)    Medication List for Home    Based on the information you provided to us as well as any changes during this visit, the following is your updated medication list.  Compare this with your prescription bottles at home. If you have any questions or concerns, contact your primary care physician's office. Daily Medication List (This medication list can be shared with any healthcare provider who is helping you manage your medications)      ASK your doctor about these medications if you have questions        Last Dose    Next Dose Due AM NOON PM NIGHT    albuterol sulfate  (90 Base) MCG/ACT inhaler   Commonly known as:  PROVENTIL HFA   Inhale 2 puffs into the lungs every 4 hours as needed for Wheezing or Shortness of Breath (Space out to every 6 hours as symptoms improve) Space out to every 6 hours as symptoms improve. cephALEXin 500 MG capsule   Commonly known as:  KEFLEX   Take 1 capsule by mouth 3 times daily                                         docusate sodium 150 MG/15ML liquid   Commonly known as:  COLACE   Take 10 mLs by mouth 3 times daily                                         fentaNYL 25 MCG/HR   Commonly known as:  [address] 1 patch onto the skin every 72 hours  Epiglottic CANCER. fentaNYL 800 MCG Liqd   Commonly known as:  SUBSYS   Place 1 spray under the tongue every 6 hours as needed for Pain . fentaNYL 75 MCG/HR   Commonly known as:  [address] 1 patch onto the skin every 72 hours  CANCER.                                          Handicap Placard Misc   by Does not apply route Issued disability placard from August 21, 2017 till February 2018                                         nystatin 142409 UNIT/ML suspension   Commonly known as:  MYCOSTATIN   Take 5 mLs by mouth 4 times daily omeprazole 2 MG/ML Susp 2 mg/mL oral suspension   Commonly known as:  PRILOSEC   Take 20 mLs by mouth Daily                                         ondansetron 4 MG disintegrating tablet   Commonly known as:  ZOFRAN ODT   Place 1 tablet under your tongue every 8 hours                                         ondansetron 4 MG tablet   Commonly known as:  ZOFRAN   Take 1 tablet by mouth every 8 hours as needed (For nausea)                                         potassium chloride 10 MEQ extended release capsule   Commonly known as:  MICRO-K   Take 10 mEq by mouth 3 times daily                                         promethazine 6.25 MG/5ML syrup   Commonly known as:  PHENERGAN   Take 6.25 mg by mouth 4 times daily as needed for Nausea                                         sennosides 8.8 MG/5ML syrup   Commonly known as:  SENOKOT   Take 5 mLs by mouth daily as needed (For constipation)                                         traZODone 50 MG tablet   Commonly known as:  DESYREL   Take 1 tablet by mouth nightly                                                 Allergies as of 9/22/2017     No Known Allergies      Immunizations as of 9/22/2017     No immunizations on file. Last Vitals          Most Recent Value    Temp  100.2 °F (37.9 °C)    Pulse  113    Resp  18    BP  107/64         After Visit Summary    This summary was created for you. Thank you for entrusting your care to us. The following information includes details about your hospital/visit stay along with steps you should take to help with your recovery once you leave the hospital.  In this packet, you will find information about the topics listed below:    · Instructions about your medications including a list of your home medications  · A summary of your hospital visit  · Follow-up appointments once you have left the hospital  · Your care plan at home      You may receive a survey regarding the care you received during your stay. Your input is valuable to us. We encourage you to complete and return your survey in the envelope provided. We hope you will choose us in the future for your healthcare needs. Patient Information     Patient Name MEE Frye 1970      Care Provided at:     Name Address Phone       [address] [phone]            Your Visit    Here you will find information about your visit, including the reason for your visit. Please take this sheet with you when you visit your doctor or other health care provider in the future. It will help determine the best possible medical care for you at that time. If you have any questions once you leave the hospital, please call the department phone number listed below. Why you were here     Your primary diagnosis was:  Not on File      Visit Information     Date & Time Department Dept. Phone    2017 [facility] [phone]       Follow-up Appointments    Below is a list of your follow-up and future appointments. This may not be a complete list as you may have made appointments directly with providers that we are not aware of or your providers may have made some for you. Please call your providers to confirm appointments. It is important to keep your appointments. Please bring your current insurance card, photo ID, co-pay, and all medication bottles to your appointment. If self-pay, payment is expected at the time of service. Future Appointments     2017 2:15 PM     Appointment with Kell Merlos MD at [address] [place] [address] ([phone])   Please arrive 15 minutes prior to appointment, bring photo ID and insurance card. [address] [place] [id number]       10/2/2017 8:45 AM     Appointment with Dusty Enrique MD at [facility] ([phone])   Please arrive 15 minutes prior to appointment, bring photo ID and IMPROVE YOUR CURRENT AND FUTURE HEALTH. Call the [address] [place] [address] at [place] NOW ([phone])    Smoking harms nonsmokers. When nonsmokers are around people who smoke, they absorb nicotine, carbon monoxide, and other ingredients of tobacco smoke. DO NOT SMOKE AROUND CHILDREN     Children exposed to secondhand smoke are at an increased risk of:  Sudden Infant Death Syndrome (SIDS), acute respiratory infections, inflammation of the middle ear, and severe asthma. Over a longer time, it causes heart disease and lung cancer. There is no safe level of exposure to secondhand smoke. gestigonhart Signup     [employer] allows you to send messages to your doctor, view your test results, renew your prescriptions, schedule appointments, view visit notes, and more. How Do I Sign Up? 1. In your Internet browser, go to [email or web address]  2. Click on the Sign Up Now link in the Sign In box. You will see the New Member Sign Up page. 3. Enter your [employer] Access Code exactly as it appears below. You will not need to use this code after youve completed the sign-up process. If you do not sign up before the expiration date, you must request a new code. [employer] Access Code: [email or web address]  Expires: 11/10/2017 11:01 AM    4. Enter your Social Security Number ([id number]) and Date of Birth (mm/dd/yyyy) as indicated and click Submit. You will be taken to the next sign-up page. 5. Create a [facility] ID. This will be your [employer] login ID and cannot be changed, so think of one that is secure and easy to remember. 6. Create a [employer] password. You can change your password at any time. 7. Enter your Password Reset Question and Answer. This can be used at a later time if you forget your password. 8. Enter your e-mail address. You will receive e-mail notification when new information is available in [address] 9. Click Sign Up. You can now view your medical record. Additional Information  If you have questions, please contact the physician practice where you receive care. Remember, MyChart is NOT to be used for urgent needs. For medical emergencies, dial 911. For questions regarding your MyChart account call [phone]. If you have a clinical question, please call your doctor's office. View your information online  ? Review your current list of  medications, immunization, and allergies. ? Review your future test results online . ? Review your discharge instructions provided by your caregivers at discharge    Certain functionality such as prescription refills, scheduling appointments or sending messages to your provider are not activated if your provider does not use MEDSEEK in his/her office    For questions regarding your MyChart account call [phone]. If you have a clinical question, please call your doctor's office. The information on all pages of the After Visit Summary has been reviewed with me, the patient and/or responsible adult, by my health care provider(s). I had the opportunity to ask questions regarding this information. I understand I should dispose of my armband safely at home to protect my health information. A complete copy of the After Visit Summary has been given to me, the patient and/or responsible adult.            Patient Signature/Responsible Adult:____________________    Clinician Signature:_____________________    Date:_____________________    Time:_____________________

## 2017-09-22 NOTE — IP AVS SNAPSHOT
Patient Information     Patient Name MEE Lopez 1970         This is your updated medication list to keep with you all times      ASK your doctor about these medications     albuterol sulfate  (90 Base) MCG/ACT inhaler   Commonly known as:  PROVENTIL HFA   Inhale 2 puffs into the lungs every 4 hours as needed for Wheezing or Shortness of Breath (Space out to every 6 hours as symptoms improve) Space out to every 6 hours as symptoms improve. cephALEXin 500 MG capsule   Commonly known as:  KEFLEX   Take 1 capsule by mouth 3 times daily       docusate sodium 150 MG/15ML liquid   Commonly known as:  COLACE   Take 10 mLs by mouth 3 times daily       * fentaNYL 25 MCG/HR   Commonly known as:  [address] 1 patch onto the skin every 72 hours  Epiglottic CANCER. * fentaNYL 800 MCG Liqd   Commonly known as:  SUBSYS   Place 1 spray under the tongue every 6 hours as needed for Pain . * fentaNYL 75 MCG/HR   Commonly known as:  [address] 1 patch onto the skin every 72 hours  CANCER.        Handicap Placard Misc   by Does not apply route Issued disability placard from 2017 till 2018       nystatin [id number] UNIT/ML suspension   Commonly known as:  MYCOSTATIN   Take 5 mLs by mouth 4 times daily       omeprazole 2 MG/ML Susp 2 mg/mL oral suspension   Commonly known as:  PRILOSEC   Take 20 mLs by mouth Daily       ondansetron 4 MG disintegrating tablet   Commonly known as:  ZOFRAN ODT   Place 1 tablet under your tongue every 8 hours       ondansetron 4 MG tablet   Commonly known as:  ZOFRAN   Take 1 tablet by mouth every 8 hours as needed (For nausea)       potassium chloride 10 MEQ extended release capsule   Commonly known as:  MICRO-K       promethazine 6.25 MG/5ML syrup   Commonly known as:  PHENERGAN       sennosides 8.8 MG/5ML syrup   Commonly known as:  SENOKOT   Take 5 mLs by mouth daily as needed (For constipation)       traZODone 50 MG tablet Commonly known as:  DESYREL   Take 1 tablet by mouth nightly       * Notice: This list has 3 medication(s) that are the same as other medications prescribed for you. Read the directions carefully, and ask your doctor or other care provider to review them with you.

## 2017-09-22 NOTE — PROGRESS NOTES
Patient and girlfriend arrived on floor stating that needs fluids today. Patient not on schedule. Patient very pale and on arrival, patients girlfriend stating that he needs fluids. And that he has only has had one can of feeding today. patient stating that doesn't want to stay for fluids. Patient and girl friend explained that one can of feeding is not enough fluids for a day let alone with it being in the [address] outside. Informed of what can happen if get dehydrated. patient agreeable to fluids if can be done by 330 radaition time

## 2017-09-22 NOTE — PROGRESS NOTES
Patient assessed for the following post fluids:    Dizziness   No  Lightheadedness  No      Acute nausea/vomiting No  Headache   No  Chest pain/pressure  No  Rash/itching   No  Shortness of breath  No    Patient kept for 20 minutes observation post infusion     Patient tolerated hydration  without any complications. Last vital signs:   BP 90/78  Pulse 128  Temp 100 °F (37.8 °C) (Oral)   Resp 18  Wt 113 lb (51.3 kg)  SpO2 93%  BMI 16.21 kg/m2    . Patient instructed if experience any of the above symptoms following today's infusion,he is to notify MD immediately or go to the emergency department. Discharge instructions given to patient. Verbalizes understanding. Off unit via wheelchair accompanied by girlfriend  with belongings.

## 2017-09-22 NOTE — PLAN OF CARE
Problem: Musculor/Skeletal Functional Status  Intervention: Fall precautions  Patient aware of fall precautions for here and at home -call light in reach while here     Goal: Absence of falls  Outcome: Met This Shift  Patient will have no falls this admission    Problem: Intellectual/Education/Knowledge Deficit  Intervention: Verbal/written education provided  Discharge instruction sheets    Goal: Teaching initiated upon admission  Patient and girlfriend verbalized understanding of orders for hydration and the need to increase water and feedings through the peg tube. Patient and girlfriend also verbalized understanding of the consequences that can happen if don't increase fluid intake. Goal: Written Disposition Instruction form completed  Outcome: Met This Shift  Discharge instructions given and reviewed with patient. All questions answered. Patient verbalized understanding    Problem: Discharge Planning  Intervention: Interaction with patient/family and care team  Patient is relucant to be complaint with fluid and nutrition supplimentation but was agreeable to iv hydration today  Intervention: Discharge to appropriate level of care  Discharge home with girlfriend    Goal: Knowledge of discharge instructions  Knowledge of discharge instructions  Outcome: Met This Shift  Patient and family member able to teach back follow up appointments and when to call the doctor. Patient offers no questions at this time    Comments:   Care plan reviewed with patient and girlfriend. Patient and girlfriend verbalize understanding of the plan of care and contribute to goal setting.

## 2017-09-25 ENCOUNTER — HOSPITAL ENCOUNTER (OUTPATIENT)
Dept: RADIATION ONCOLOGY | Age: 47
Discharge: HOME | End: 2017-09-25
Payer: MEDICARE

## 2017-09-25 ENCOUNTER — HOSPITAL ENCOUNTER (OUTPATIENT)
Dept: INFUSION THERAPY | Age: 47
Discharge: HOME | End: 2017-09-25
Payer: MEDICARE

## 2017-09-25 VITALS
TEMPERATURE: 98.8 F | DIASTOLIC BLOOD PRESSURE: 58 MMHG | SYSTOLIC BLOOD PRESSURE: 100 MMHG | OXYGEN SATURATION: 95 % | RESPIRATION RATE: 18 BRPM | HEART RATE: 105 BPM

## 2017-09-25 DIAGNOSIS — D66 FACTOR VIII DEFICIENCY (HCC): ICD-10-CM

## 2017-09-25 DIAGNOSIS — F10.11 HISTORY OF ALCOHOL ABUSE: ICD-10-CM

## 2017-09-25 DIAGNOSIS — J43.9 PULMONARY EMPHYSEMA, UNSPECIFIED EMPHYSEMA TYPE (HCC): ICD-10-CM

## 2017-09-25 DIAGNOSIS — R04.2 COUGH WITH HEMOPTYSIS: ICD-10-CM

## 2017-09-25 DIAGNOSIS — C76.0 RECENT DIAGNOSIS OF HEAD AND NECK CANCER (HCC): ICD-10-CM

## 2017-09-25 DIAGNOSIS — E46 MALNUTRITION (HCC): ICD-10-CM

## 2017-09-25 DIAGNOSIS — D62 ANEMIA ASSOCIATED WITH ACUTE BLOOD LOSS: ICD-10-CM

## 2017-09-25 DIAGNOSIS — J44.9 CHRONIC OBSTRUCTIVE PULMONARY DISEASE, UNSPECIFIED COPD TYPE (HCC): ICD-10-CM

## 2017-09-25 DIAGNOSIS — R91.8 PULMONARY INFILTRATES: ICD-10-CM

## 2017-09-25 DIAGNOSIS — F10.930 ALCOHOL WITHDRAWAL SYNDROME WITHOUT COMPLICATION (HCC): ICD-10-CM

## 2017-09-25 DIAGNOSIS — C76.0 RECENT DIAGNOSIS OF HEAD AND NECK CANCER (HCC): Primary | ICD-10-CM

## 2017-09-25 DIAGNOSIS — F17.219 CIGARETTE NICOTINE DEPENDENCE WITH NICOTINE-INDUCED DISORDER: ICD-10-CM

## 2017-09-25 DIAGNOSIS — F17.200 SMOKER: ICD-10-CM

## 2017-09-25 PROCEDURE — 2580000003 HC RX 258: Performed by: INTERNAL MEDICINE

## 2017-09-25 PROCEDURE — 96360 HYDRATION IV INFUSION INIT: CPT

## 2017-09-25 PROCEDURE — 77386 HC NTSTY MODUL RAD TX DLVR CPLX: CPT | Performed by: RADIOLOGY

## 2017-09-25 PROCEDURE — 77336 RADIATION PHYSICS CONSULT: CPT | Performed by: RADIOLOGY

## 2017-09-25 RX ORDER — 0.9 % SODIUM CHLORIDE 0.9 %
500 INTRAVENOUS SOLUTION INTRAVENOUS ONCE
Status: CANCELLED | OUTPATIENT
Start: 2017-09-25 | End: 2017-09-25

## 2017-09-25 RX ORDER — SODIUM CHLORIDE 0.9 % (FLUSH) 0.9 %
10 SYRINGE (ML) INJECTION PRN
Status: CANCELLED | OUTPATIENT
Start: 2017-09-25

## 2017-09-25 RX ORDER — 0.9 % SODIUM CHLORIDE 0.9 %
500 INTRAVENOUS SOLUTION INTRAVENOUS ONCE
Status: COMPLETED | OUTPATIENT
Start: 2017-09-25 | End: 2017-09-25

## 2017-09-25 RX ORDER — SODIUM CHLORIDE 0.9 % (FLUSH) 0.9 %
20 SYRINGE (ML) INJECTION PRN
Status: CANCELLED | OUTPATIENT
Start: 2017-09-25

## 2017-09-25 RX ORDER — HEPARIN SODIUM (PORCINE) LOCK FLUSH IV SOLN 100 UNIT/ML 100 UNIT/ML
500 SOLUTION INTRAVENOUS PRN
Status: CANCELLED | OUTPATIENT
Start: 2017-09-25

## 2017-09-25 RX ADMIN — SODIUM CHLORIDE 500 ML: 9 INJECTION, SOLUTION INTRAVENOUS at 14:35

## 2017-09-25 NOTE — PROGRESS NOTES
Patient tolerated  IV hydration without any complications. Discharge instructions given to patient-verbalizes understanding. Ambulated off unit per self in stable condition accompanied by family.

## 2017-09-25 NOTE — IP AVS SNAPSHOT
After Visit Summary  (Discharge Instructions)    Medication List for Home    Based on the information you provided to us as well as any changes during this visit, the following is your updated medication list.  Compare this with your prescription bottles at home. If you have any questions or concerns, contact your primary care physician's office. Daily Medication List (This medication list can be shared with any healthcare provider who is helping you manage your medications)      ASK your doctor about these medications if you have questions        Last Dose    Next Dose Due AM NOON PM NIGHT    albuterol sulfate  (90 Base) MCG/ACT inhaler   Commonly known as:  PROVENTIL HFA   Inhale 2 puffs into the lungs every 4 hours as needed for Wheezing or Shortness of Breath (Space out to every 6 hours as symptoms improve) Space out to every 6 hours as symptoms improve. cephALEXin 500 MG capsule   Commonly known as:  KEFLEX   Take 1 capsule by mouth 3 times daily                                         docusate sodium 150 MG/15ML liquid   Commonly known as:  COLACE   Take 10 mLs by mouth 3 times daily                                         fentaNYL 25 MCG/HR   Commonly known as:  [address] 1 patch onto the skin every 72 hours  Epiglottic CANCER. fentaNYL 800 MCG Liqd   Commonly known as:  SUBSYS   Place 1 spray under the tongue every 6 hours as needed for Pain . fentaNYL 75 MCG/HR   Commonly known as:  [address] 1 patch onto the skin every 72 hours  CANCER.                                          Handicap Placard Misc   by Does not apply route Issued disability placard from August 21, 2017 till February 2018                                         nystatin 689666 UNIT/ML suspension   Commonly known as:  MYCOSTATIN   Take 5 mLs by mouth 4 times daily omeprazole 2 MG/ML Susp 2 mg/mL oral suspension   Commonly known as:  PRILOSEC   Take 20 mLs by mouth Daily                                         ondansetron 4 MG disintegrating tablet   Commonly known as:  ZOFRAN ODT   Place 1 tablet under your tongue every 8 hours                                         ondansetron 4 MG tablet   Commonly known as:  ZOFRAN   Take 1 tablet by mouth every 8 hours as needed (For nausea)                                         potassium chloride 10 MEQ extended release capsule   Commonly known as:  MICRO-K   Take 10 mEq by mouth 3 times daily                                         promethazine 6.25 MG/5ML syrup   Commonly known as:  PHENERGAN   Take 6.25 mg by mouth 4 times daily as needed for Nausea                                         sennosides 8.8 MG/5ML syrup   Commonly known as:  SENOKOT   Take 5 mLs by mouth daily as needed (For constipation)                                         traZODone 50 MG tablet   Commonly known as:  DESYREL   Take 1 tablet by mouth nightly                                                 Allergies as of 9/25/2017     No Known Allergies      Immunizations as of 9/25/2017     No immunizations on file. Last Vitals          Most Recent Value    Temp  99.3 °F (37.4 °C)    Pulse  123    Resp  18    BP  92/62         After Visit Summary    This summary was created for you. Thank you for entrusting your care to us. The following information includes details about your hospital/visit stay along with steps you should take to help with your recovery once you leave the hospital.  In this packet, you will find information about the topics listed below:    · Instructions about your medications including a list of your home medications  · A summary of your hospital visit  · Follow-up appointments once you have left the hospital  · Your care plan at home      You may receive a survey regarding the care you received during your stay. Your input is valuable to us. We encourage you to complete and return your survey in the envelope provided. We hope you will choose us in the future for your healthcare needs. Patient Information     Patient Name MEE Dunbar 1970      Care Provided at:     Name Address Phone       [address] [phone]            Your Visit    Here you will find information about your visit, including the reason for your visit. Please take this sheet with you when you visit your doctor or other health care provider in the future. It will help determine the best possible medical care for you at that time. If you have any questions once you leave the hospital, please call the department phone number listed below. Why you were here     Your primary diagnosis was:  Not on File      Visit Information     Date & Time Department Dept. Phone    2017 [facility] [phone]       Follow-up Appointments    Below is a list of your follow-up and future appointments. This may not be a complete list as you may have made appointments directly with providers that we are not aware of or your providers may have made some for you. Please call your providers to confirm appointments. It is important to keep your appointments. Please bring your current insurance card, photo ID, co-pay, and all medication bottles to your appointment. If self-pay, payment is expected at the time of service. Future Appointments     2017 2:15 PM     [facility] ([phone])   Please arrive 15 minutes prior to appointment, bring photo ID and insurance card. [address] [place] [address]       2017 11:00 AM     Appointment with Kendra Michael CNP at [address] [place] [address] ([phone])   Please arrive 15 minutes prior to scheduled appointment time to complete paper work, bring photo ID and insurance cards. [place] [id number] [phone] Hanna Benjamin       10/2/2017 8:45 AM     Appointment with Nitin Moy MD at [facility] ([phone])   Please arrive 15 minutes prior to appointment, bring photo ID and insurance card. Please arrive 15 minutes prior to appointment, bring photo ID and insurance card. [facility]       10/2/2017  9:45 AM     Appointment with [facility] ONC BED 2 at [address] [place] [address] ([phone])   [address] [place] [address], [address]  [address]        Date Due    HIV screening is recommended for all people regardless of risk factors  aged 15-65 years at least once (lifetime) who have never been HIV tested. 12/14/1986    Tetanus Combination Vaccine (1 - Tdap) 12/14/1990    Pneumococcal Vaccine - Pneumovax for adults aged 19-64 years with: chronic heart disease, chronic lung disease, diabetes mellitus, alcoholism, chronic liver disease, or cigarette smoking. (1 of 1 - PPSV23) 12/14/1990    Cholesterol Screening 12/14/2011    Yearly Flu Vaccine (1) 9/1/2017                 Care Plan Once You Return Home    This section includes instructions you will need to follow once you leave the hospital.  Your care team will discuss these with you, so you and those caring for you know how to best care for your health needs at home. This section may also include educational information about certain health topics that may be of help to you. Discharge Instructions       Please contact your Oncologist if you have any questions regarding your IV hydration. If you experience any of the following symptoms after  todays treatment, notify your physician immediately or go to the emergency department.     * dizziness/lightheadedness  * acute nausea/vomiting that is not relieved with medication  * headache that is not relieved from Tylenol or your usual pain medication * chest pain or pressure  * rash/itching  * shortness of breath    Drink plenty of fluids; at least 48-64 ounces daily    Call if you develop any fever, chills, or other signs or symptoms of an infection. Important information for a smoker       SMOKING: QUIT SMOKING. THIS IS THE MOST IMPORTANT ACTION YOU CAN TAKE TO IMPROVE YOUR CURRENT AND FUTURE HEALTH. Call the [address] [place] [address] at [place] NOW ([phone])    Smoking harms nonsmokers. When nonsmokers are around people who smoke, they absorb nicotine, carbon monoxide, and other ingredients of tobacco smoke. DO NOT SMOKE AROUND CHILDREN     Children exposed to secondhand smoke are at an increased risk of:  Sudden Infant Death Syndrome (SIDS), acute respiratory infections, inflammation of the middle ear, and severe asthma. Over a longer time, it causes heart disease and lung cancer. There is no safe level of exposure to secondhand smoke. [employer] Signup     [employer] allows you to send messages to your doctor, view your test results, renew your prescriptions, schedule appointments, view visit notes, and more. How Do I Sign Up? 1. In your Internet browser, go to [email or web address]  2. Click on the Sign Up Now link in the Sign In box. You will see the New Member Sign Up page. 3. Enter your [employer] Access Code exactly as it appears below. You will not need to use this code after youve completed the sign-up process. If you do not sign up before the expiration date, you must request a new code. [employer] Access Code: [email or web address]  Expires: 11/10/2017 11:01 AM    4. Enter your Social Security Number ([id number]) and Date of Birth (mm/dd/yyyy) as indicated and click Submit. You will be taken to the next sign-up page. 5. Create a [employer] ID. This will be your [employer] login ID and cannot be changed, so think of one that is secure and easy to remember. 6. Create a [employer] password. You can change your password at any time. 7. Enter your Password Reset Question and Answer. This can be used at a later time if you forget your password. 8. Enter your e-mail address. You will receive e-mail notification when new information is available in [address] 9. Click Sign Up. You can now view your medical record. Additional Information  If you have questions, please contact the physician practice where you receive care. Remember, [employer] is NOT to be used for urgent needs. For medical emergencies, dial 911. For questions regarding your [employer] account call [phone]. If you have a clinical question, please call your doctor's office. View your information online  ? Review your current list of  medications, immunization, and allergies. ? Review your future test results online . ? Review your discharge instructions provided by your caregivers at discharge    Certain functionality such as prescription refills, scheduling appointments or sending messages to your provider are not activated if your provider does not use [employer] in his/her office    For questions regarding your [employer] account call [phone]. If you have a clinical question, please call your doctor's office. The information on all pages of the After Visit Summary has been reviewed with me, the patient and/or responsible adult, by my health care provider(s). I had the opportunity to ask questions regarding this information. I understand I should dispose of my armband safely at home to protect my health information. A complete copy of the After Visit Summary has been given to me, the patient and/or responsible adult.            Patient Signature/Responsible Adult:____________________    Clinician Signature:_____________________    Date:_____________________    Time:_____________________

## 2017-09-25 NOTE — IP AVS SNAPSHOT
Patient Information     Patient Name MEE Pizarro 1970         This is your updated medication list to keep with you all times      ASK your doctor about these medications     albuterol sulfate  (90 Base) MCG/ACT inhaler   Commonly known as:  PROVENTIL HFA   Inhale 2 puffs into the lungs every 4 hours as needed for Wheezing or Shortness of Breath (Space out to every 6 hours as symptoms improve) Space out to every 6 hours as symptoms improve. cephALEXin 500 MG capsule   Commonly known as:  KEFLEX   Take 1 capsule by mouth 3 times daily       docusate sodium 150 MG/15ML liquid   Commonly known as:  COLACE   Take 10 mLs by mouth 3 times daily       * fentaNYL 25 MCG/HR   Commonly known as:  [address] 1 patch onto the skin every 72 hours  Epiglottic CANCER. * fentaNYL 800 MCG Liqd   Commonly known as:  SUBSYS   Place 1 spray under the tongue every 6 hours as needed for Pain . * fentaNYL 75 MCG/HR   Commonly known as:  [address] 1 patch onto the skin every 72 hours  CANCER.        Handicap Placard Misc   by Does not apply route Issued disability placard from 2017 till 2018       nystatin [id number] UNIT/ML suspension   Commonly known as:  MYCOSTATIN   Take 5 mLs by mouth 4 times daily       omeprazole 2 MG/ML Susp 2 mg/mL oral suspension   Commonly known as:  PRILOSEC   Take 20 mLs by mouth Daily       ondansetron 4 MG disintegrating tablet   Commonly known as:  ZOFRAN ODT   Place 1 tablet under your tongue every 8 hours       ondansetron 4 MG tablet   Commonly known as:  ZOFRAN   Take 1 tablet by mouth every 8 hours as needed (For nausea)       potassium chloride 10 MEQ extended release capsule   Commonly known as:  MICRO-K       promethazine 6.25 MG/5ML syrup   Commonly known as:  PHENERGAN       sennosides 8.8 MG/5ML syrup   Commonly known as:  SENOKOT   Take 5 mLs by mouth daily as needed (For constipation)       traZODone 50 MG tablet Commonly known as:  DESYREL   Take 1 tablet by mouth nightly       * Notice: This list has 3 medication(s) that are the same as other medications prescribed for you. Read the directions carefully, and ask your doctor or other care provider to review them with you.

## 2017-09-26 PROCEDURE — 77386 HC NTSTY MODUL RAD TX DLVR CPLX: CPT | Performed by: RADIOLOGY

## 2017-09-28 ENCOUNTER — HOSPITAL ENCOUNTER (OUTPATIENT)
Dept: CT IMAGING | Age: 47
Discharge: HOME | End: 2017-09-28
Payer: MEDICARE

## 2017-09-28 DIAGNOSIS — C32.8 MALIGNANT NEOPLASM OF OVERLAPPING SITES OF LARYNX (HCC): ICD-10-CM

## 2017-09-28 PROCEDURE — 77386 HC NTSTY MODUL RAD TX DLVR CPLX: CPT | Performed by: RADIOLOGY

## 2017-09-28 PROCEDURE — 3209999900 CT GUIDE RADIATION THERAPY NO CHARGE

## 2017-09-29 ENCOUNTER — HOSPITAL ENCOUNTER (OUTPATIENT)
Dept: INFUSION THERAPY | Age: 47
Discharge: HOME | End: 2017-09-29
Payer: MEDICARE

## 2017-09-29 VITALS
RESPIRATION RATE: 18 BRPM | HEART RATE: 150 BPM | OXYGEN SATURATION: 96 % | DIASTOLIC BLOOD PRESSURE: 73 MMHG | SYSTOLIC BLOOD PRESSURE: 125 MMHG | TEMPERATURE: 98.9 F

## 2017-09-29 DIAGNOSIS — J44.9 CHRONIC OBSTRUCTIVE PULMONARY DISEASE, UNSPECIFIED COPD TYPE (HCC): ICD-10-CM

## 2017-09-29 DIAGNOSIS — F17.200 SMOKER: ICD-10-CM

## 2017-09-29 DIAGNOSIS — R04.2 COUGH WITH HEMOPTYSIS: ICD-10-CM

## 2017-09-29 DIAGNOSIS — C76.0 RECENT DIAGNOSIS OF HEAD AND NECK CANCER (HCC): ICD-10-CM

## 2017-09-29 DIAGNOSIS — D66 FACTOR VIII DEFICIENCY (HCC): ICD-10-CM

## 2017-09-29 DIAGNOSIS — D62 ANEMIA ASSOCIATED WITH ACUTE BLOOD LOSS: ICD-10-CM

## 2017-09-29 DIAGNOSIS — F17.219 CIGARETTE NICOTINE DEPENDENCE WITH NICOTINE-INDUCED DISORDER: ICD-10-CM

## 2017-09-29 DIAGNOSIS — F10.11 HISTORY OF ALCOHOL ABUSE: ICD-10-CM

## 2017-09-29 DIAGNOSIS — F10.930 ALCOHOL WITHDRAWAL SYNDROME WITHOUT COMPLICATION (HCC): ICD-10-CM

## 2017-09-29 DIAGNOSIS — R91.8 PULMONARY INFILTRATES: ICD-10-CM

## 2017-09-29 DIAGNOSIS — J43.9 PULMONARY EMPHYSEMA, UNSPECIFIED EMPHYSEMA TYPE (HCC): ICD-10-CM

## 2017-09-29 PROCEDURE — 99211 OFF/OP EST MAY X REQ PHY/QHP: CPT

## 2017-09-29 RX ORDER — 0.9 % SODIUM CHLORIDE 0.9 %
500 INTRAVENOUS SOLUTION INTRAVENOUS ONCE
Status: CANCELLED | OUTPATIENT
Start: 2017-09-29 | End: 2017-09-29

## 2017-09-29 NOTE — PLAN OF CARE
Problem: Discharge Planning  Intervention: Interaction with patient/family and care team  Patient and girlfriend verbalized understanding to go to the emergency room from here  Intervention: Discharge to appropriate level of care  Discharge to go to the emergency room    Goal: Knowledge of discharge instructions  Knowledge of discharge instructions  Outcome: Met This Shift  Patient and girlfriend verbalized understanding to go to the emergency room    Comments:   Care plan reviewed with patient and girlfriend. Patient and girlfriend verbalize understanding of the plan of care and contribute to goal setting.

## 2017-09-29 NOTE — PROGRESS NOTES
Upon arrival to floor patient told  that wasn't feeling good and was feeling anxious and needed to go out side and get some air. patient left via wheelchair accompanied by girlfriend . Patient returned a short time later via wheelchair accompanied by girlfriend and was escorted to the infusion area. Patient got up out of wheel chair per self and went to infusion chair. Patient's girlfriend  States that couldn't due radaition therapy due to panic attack. Patient very pale looking in chair. Vital signs taken and patient has a rapid heart rate when listening to patient chest heart rate is rapid and regular. Pulse ox was 100% on room air. Patient states can you turn that noise off ( the heart rate noise on the pulse oxygen machine) . Patient girlfriend states that,that is making him more anxious. Dr Esteban Arnett called and order received to send patient to ER. Patient states I am not going via rescue squad, I will have my girlfriend take me over to the ER. Patient and girlfriend explained the consequences of what could happen on the way to the EMergency room and he still refused to have me call the squad.  Report called the emergency room and patient left unit via wheelchair escorted by girlfrienroxann at [phone]

## 2017-10-02 ENCOUNTER — HOSPITAL ENCOUNTER (OUTPATIENT)
Dept: RADIATION ONCOLOGY | Age: 47
Discharge: HOME | End: 2017-10-02
Payer: MEDICARE

## 2017-10-02 PROCEDURE — 77386 HC NTSTY MODUL RAD TX DLVR CPLX: CPT

## 2017-10-03 ENCOUNTER — HOSPITAL ENCOUNTER (INPATIENT)
Age: 47
LOS: 6 days | Discharge: LEFT BEFORE DISCHARGE | DRG: 137 | End: 2017-10-09
Attending: INTERNAL MEDICINE | Admitting: INTERNAL MEDICINE
Payer: MEDICARE

## 2017-10-03 ENCOUNTER — APPOINTMENT (OUTPATIENT)
Dept: CT IMAGING | Age: 47
DRG: 137 | End: 2017-10-03
Payer: MEDICARE

## 2017-10-03 ENCOUNTER — APPOINTMENT (OUTPATIENT)
Dept: GENERAL RADIOLOGY | Age: 47
DRG: 137 | End: 2017-10-03
Payer: MEDICARE

## 2017-10-03 DIAGNOSIS — J18.9 PNEUMONIA OF BOTH LOWER LOBES DUE TO INFECTIOUS ORGANISM: ICD-10-CM

## 2017-10-03 DIAGNOSIS — J96.01 ACUTE HYPOXEMIC RESPIRATORY FAILURE (HCC): ICD-10-CM

## 2017-10-03 DIAGNOSIS — J44.9 CHRONIC OBSTRUCTIVE PULMONARY DISEASE, UNSPECIFIED COPD TYPE (HCC): Primary | ICD-10-CM

## 2017-10-03 PROBLEM — J15.3 PNEUMONIA OF BOTH LUNGS DUE TO GROUP B STREPTOCOCCUS (HCC): Status: ACTIVE | Noted: 2017-10-03

## 2017-10-03 PROBLEM — R06.02 SOB (SHORTNESS OF BREATH): Status: ACTIVE | Noted: 2017-10-03

## 2017-10-03 LAB
ALBUMIN SERPL-MCNC: 3.7 G/DL (ref 3.5–5.1)
ALP BLD-CCNC: 80 U/L (ref 38–126)
ALT SERPL-CCNC: 13 U/L (ref 11–66)
ANION GAP SERPL CALCULATED.3IONS-SCNC: 15 MEQ/L (ref 8–16)
ANISOCYTOSIS: ABNORMAL
AST SERPL-CCNC: 21 U/L (ref 5–40)
BASOPHILS # BLD: 0.6 %
BASOPHILS ABSOLUTE: 0 THOU/MM3 (ref 0–0.1)
BILIRUB SERPL-MCNC: 0.2 MG/DL (ref 0.3–1.2)
BUN BLDV-MCNC: 6 MG/DL (ref 7–22)
CALCIUM SERPL-MCNC: 9.2 MG/DL (ref 8.5–10.5)
CHLORIDE BLD-SCNC: 96 MEQ/L (ref 98–111)
CO2: 22 MEQ/L (ref 23–33)
CREAT SERPL-MCNC: 0.7 MG/DL (ref 0.4–1.2)
D-DIMER QUANTITATIVE: 619 NG/ML FEU (ref 0–500)
EKG ATRIAL RATE: 118 BPM
EKG P AXIS: 87 DEGREES
EKG P-R INTERVAL: 154 MS
EKG Q-T INTERVAL: 318 MS
EKG QRS DURATION: 82 MS
EKG QTC CALCULATION (BAZETT): 445 MS
EKG R AXIS: 95 DEGREES
EKG T AXIS: 77 DEGREES
EKG VENTRICULAR RATE: 118 BPM
EOSINOPHIL # BLD: 2.1 %
EOSINOPHILS ABSOLUTE: 0.1 THOU/MM3 (ref 0–0.4)
GFR SERPL CREATININE-BSD FRML MDRD: > 90 ML/MIN/1.73M2
GLUCOSE BLD-MCNC: 112 MG/DL (ref 70–108)
HCT VFR BLD CALC: 38.2 % (ref 42–52)
HEMOGLOBIN: 12.9 GM/DL (ref 14–18)
INR BLD: 0.99 (ref 0.85–1.13)
LYMPHOCYTES # BLD: 10.9 %
LYMPHOCYTES ABSOLUTE: 0.6 THOU/MM3 (ref 1–4.8)
MCH RBC QN AUTO: 32.7 PG (ref 27–31)
MCHC RBC AUTO-ENTMCNC: 33.7 GM/DL (ref 33–37)
MCV RBC AUTO: 97 FL (ref 80–94)
MONOCYTES # BLD: 8.2 %
MONOCYTES ABSOLUTE: 0.4 THOU/MM3 (ref 0.4–1.3)
NUCLEATED RED BLOOD CELLS: 0 /100 WBC
OSMOLALITY CALCULATION: 264.7 MOSMOL/KG (ref 275–300)
PDW BLD-RTO: 17.1 % (ref 11.5–14.5)
PLATELET # BLD: 271 THOU/MM3 (ref 130–400)
PMV BLD AUTO: 7.9 MCM (ref 7.4–10.4)
POTASSIUM SERPL-SCNC: 3.8 MEQ/L (ref 3.5–5.2)
RBC # BLD: 3.93 MILL/MM3 (ref 4.7–6.1)
RBC # BLD: NORMAL 10*6/UL
SEG NEUTROPHILS: 78.2 %
SEGMENTED NEUTROPHILS ABSOLUTE COUNT: 4.1 THOU/MM3 (ref 1.8–7.7)
SODIUM BLD-SCNC: 133 MEQ/L (ref 135–145)
TOTAL PROTEIN: 7.4 G/DL (ref 6.1–8)
TROPONIN T: < 0.01 NG/ML
TSH SERPL DL<=0.05 MIU/L-ACNC: 1.28 UIU/ML (ref 0.4–4.2)
WBC # BLD: 5.2 THOU/MM3 (ref 4.8–10.8)

## 2017-10-03 PROCEDURE — 96361 HYDRATE IV INFUSION ADD-ON: CPT

## 2017-10-03 PROCEDURE — 2580000003 HC RX 258: Performed by: INTERNAL MEDICINE

## 2017-10-03 PROCEDURE — 99221 1ST HOSP IP/OBS SF/LOW 40: CPT | Performed by: INTERNAL MEDICINE

## 2017-10-03 PROCEDURE — 87040 BLOOD CULTURE FOR BACTERIA: CPT

## 2017-10-03 PROCEDURE — 84484 ASSAY OF TROPONIN QUANT: CPT

## 2017-10-03 PROCEDURE — 6370000000 HC RX 637 (ALT 250 FOR IP): Performed by: INTERNAL MEDICINE

## 2017-10-03 PROCEDURE — B32S1ZZ COMPUTERIZED TOMOGRAPHY (CT SCAN) OF RIGHT PULMONARY ARTERY USING LOW OSMOLAR CONTRAST: ICD-10-PCS | Performed by: RADIOLOGY

## 2017-10-03 PROCEDURE — 96374 THER/PROPH/DIAG INJ IV PUSH: CPT

## 2017-10-03 PROCEDURE — B32T1ZZ COMPUTERIZED TOMOGRAPHY (CT SCAN) OF LEFT PULMONARY ARTERY USING LOW OSMOLAR CONTRAST: ICD-10-PCS | Performed by: RADIOLOGY

## 2017-10-03 PROCEDURE — 6360000002 HC RX W HCPCS: Performed by: INTERNAL MEDICINE

## 2017-10-03 PROCEDURE — 80050 GENERAL HEALTH PANEL: CPT

## 2017-10-03 PROCEDURE — 85379 FIBRIN DEGRADATION QUANT: CPT

## 2017-10-03 PROCEDURE — 99254 IP/OBS CNSLTJ NEW/EST MOD 60: CPT | Performed by: INTERNAL MEDICINE

## 2017-10-03 PROCEDURE — 71275 CT ANGIOGRAPHY CHEST: CPT

## 2017-10-03 PROCEDURE — 6360000004 HC RX CONTRAST MEDICATION: Performed by: INTERNAL MEDICINE

## 2017-10-03 PROCEDURE — 99285 EMERGENCY DEPT VISIT HI MDM: CPT

## 2017-10-03 PROCEDURE — 93005 ELECTROCARDIOGRAM TRACING: CPT | Performed by: INTERNAL MEDICINE

## 2017-10-03 PROCEDURE — 71020 XR CHEST STANDARD TWO VW: CPT

## 2017-10-03 PROCEDURE — 85610 PROTHROMBIN TIME: CPT

## 2017-10-03 PROCEDURE — 1200000003 HC TELEMETRY R&B

## 2017-10-03 PROCEDURE — 36415 COLL VENOUS BLD VENIPUNCTURE: CPT

## 2017-10-03 RX ORDER — SODIUM CHLORIDE 0.9 % (FLUSH) 0.9 %
10 SYRINGE (ML) INJECTION EVERY 12 HOURS SCHEDULED
Status: DISCONTINUED | OUTPATIENT
Start: 2017-10-03 | End: 2017-10-09 | Stop reason: HOSPADM

## 2017-10-03 RX ORDER — ACETAMINOPHEN 325 MG/1
650 TABLET ORAL EVERY 4 HOURS PRN
Status: DISCONTINUED | OUTPATIENT
Start: 2017-10-03 | End: 2017-10-09 | Stop reason: HOSPADM

## 2017-10-03 RX ORDER — FENTANYL 75 UG/H
1 PATCH TRANSDERMAL
Status: DISCONTINUED | OUTPATIENT
Start: 2017-10-05 | End: 2017-10-09 | Stop reason: HOSPADM

## 2017-10-03 RX ORDER — SODIUM CHLORIDE 9 MG/ML
INJECTION, SOLUTION INTRAVENOUS CONTINUOUS
Status: DISCONTINUED | OUTPATIENT
Start: 2017-10-03 | End: 2017-10-09 | Stop reason: HOSPADM

## 2017-10-03 RX ORDER — 0.9 % SODIUM CHLORIDE 0.9 %
1000 INTRAVENOUS SOLUTION INTRAVENOUS ONCE
Status: COMPLETED | OUTPATIENT
Start: 2017-10-03 | End: 2017-10-03

## 2017-10-03 RX ORDER — IPRATROPIUM BROMIDE AND ALBUTEROL SULFATE 2.5; .5 MG/3ML; MG/3ML
1 SOLUTION RESPIRATORY (INHALATION) ONCE
Status: COMPLETED | OUTPATIENT
Start: 2017-10-03 | End: 2017-10-03

## 2017-10-03 RX ORDER — ALBUTEROL SULFATE 90 UG/1
2 AEROSOL, METERED RESPIRATORY (INHALATION) EVERY 4 HOURS PRN
Status: DISCONTINUED | OUTPATIENT
Start: 2017-10-03 | End: 2017-10-09 | Stop reason: HOSPADM

## 2017-10-03 RX ORDER — TRAZODONE HYDROCHLORIDE 50 MG/1
50 TABLET ORAL NIGHTLY
Status: DISCONTINUED | OUTPATIENT
Start: 2017-10-03 | End: 2017-10-09 | Stop reason: HOSPADM

## 2017-10-03 RX ORDER — SODIUM CHLORIDE 0.9 % (FLUSH) 0.9 %
10 SYRINGE (ML) INJECTION PRN
Status: DISCONTINUED | OUTPATIENT
Start: 2017-10-03 | End: 2017-10-09 | Stop reason: HOSPADM

## 2017-10-03 RX ORDER — DOCUSATE SODIUM 50 MG/5ML
100 LIQUID ORAL 3 TIMES DAILY
Status: DISCONTINUED | OUTPATIENT
Start: 2017-10-03 | End: 2017-10-04 | Stop reason: CLARIF

## 2017-10-03 RX ORDER — ONDANSETRON 4 MG/1
4 TABLET, ORALLY DISINTEGRATING ORAL EVERY 8 HOURS PRN
Status: DISCONTINUED | OUTPATIENT
Start: 2017-10-03 | End: 2017-10-09 | Stop reason: HOSPADM

## 2017-10-03 RX ORDER — PROMETHAZINE HYDROCHLORIDE 6.25 MG/5ML
6.25 SYRUP ORAL 4 TIMES DAILY PRN
Status: DISCONTINUED | OUTPATIENT
Start: 2017-10-03 | End: 2017-10-09 | Stop reason: HOSPADM

## 2017-10-03 RX ORDER — LORAZEPAM 2 MG/ML
0.5 INJECTION INTRAMUSCULAR ONCE
Status: COMPLETED | OUTPATIENT
Start: 2017-10-03 | End: 2017-10-03

## 2017-10-03 RX ADMIN — VANCOMYCIN HYDROCHLORIDE 1000 MG: 1 INJECTION, POWDER, LYOPHILIZED, FOR SOLUTION INTRAVENOUS at 21:07

## 2017-10-03 RX ADMIN — IOPAMIDOL 80 ML: 755 INJECTION, SOLUTION INTRAVENOUS at 12:37

## 2017-10-03 RX ADMIN — PIPERACILLIN SODIUM,TAZOBACTAM SODIUM 3.38 G: 3; .375 INJECTION, POWDER, FOR SOLUTION INTRAVENOUS at 22:43

## 2017-10-03 RX ADMIN — LANSOPRAZOLE 30 MG: 30 TABLET, ORALLY DISINTEGRATING, DELAYED RELEASE ORAL at 21:16

## 2017-10-03 RX ADMIN — SODIUM CHLORIDE: 9 INJECTION, SOLUTION INTRAVENOUS at 19:41

## 2017-10-03 RX ADMIN — NYSTATIN 500000 UNITS: 100000 SUSPENSION ORAL at 21:18

## 2017-10-03 RX ADMIN — TRAZODONE HYDROCHLORIDE 50 MG: 50 TABLET ORAL at 21:16

## 2017-10-03 RX ADMIN — SODIUM CHLORIDE 1000 ML: 9 INJECTION, SOLUTION INTRAVENOUS at 12:02

## 2017-10-03 RX ADMIN — LORAZEPAM 0.5 MG: 2 INJECTION INTRAMUSCULAR; INTRAVENOUS at 12:02

## 2017-10-03 RX ADMIN — IPRATROPIUM BROMIDE AND ALBUTEROL SULFATE 1 AMPULE: .5; 3 SOLUTION RESPIRATORY (INHALATION) at 12:14

## 2017-10-03 ASSESSMENT — ENCOUNTER SYMPTOMS
RHINORRHEA: 0
EYE DISCHARGE: 0
COUGH: 0
WHEEZING: 0
SORE THROAT: 0
SHORTNESS OF BREATH: 0
EYE REDNESS: 0
DIARRHEA: 0
NAUSEA: 0
VOMITING: 0
BACK PAIN: 0
ABDOMINAL PAIN: 0

## 2017-10-03 NOTE — ED NOTES
[facility] Patient monitored on telemetry.          Cinthia Sarabia, KENYA  44/01/78 4700

## 2017-10-03 NOTE — ED NOTES
Patient resting on cot, respirations are easy and unlabored. Updated on POC. Patient and family denies of any needs or concerns at this time.       Pastora Guadalupe RN  10/03/17 [id number]

## 2017-10-03 NOTE — CONSULTS
[place] [facility]    Patient - Tommy Watkins   MRN -  [id number]   Henrietta # - [de-identified]   - 1970      Date of Admission -  10/3/2017 11:11 AM  Date of evaluation -  10/3/2017  Room - [facility] Day - 0  Consulting - Caitlyn Fischer MD Primary Care Physician - No primary care provider on file. Problem List      Active Hospital Problems    Diagnosis Date Noted    SOB (shortness of breath) [R06.02] 10/03/2017    Pneumonia of both lungs due to group B Streptococcus (HCC) [J15.3] 10/03/2017    Recent diagnosis of head and neck cancer ([facility] [C76.0] 2017    Factor VIII deficiency ([facility] Justice Sepideh 2015    Pulmonary infiltrates [R91.8]     Cigarette nicotine dependence with nicotine-induced disorder [F17.219]     History of alcohol abuse [Z87.898] 2012    Smoker [F17.200] 2012    Bipolar affective ([address] [F31.9] 2012    COPD (chronic obstructive pulmonary disease) (HCC) [J44.9]     GERD (gastroesophageal reflux disease) [K21.9]     Bipolar disorder ([facility] [F31.9]      Reason for Consult    Bilateral pneumonia. HPI   History Obtained From: Patient  and electronic medical record. Tommy Watkins is a 40 y.o. 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 He used to follow with Dr. Jeffrey Simons. He was referred to [facility]NT. He underwent tracheostomy, esophagoscopy at [facility] along with PEG placement. The biopsy of Epiglottic mass and left base of tongue showed invasive squamous cell carcinoma. He is currently following with Dr. Dick Segundo.     He was admitted under hospitalist service. Pulmonary medicine was consulted for further management of bilateral pneumonia.       PMHx   Past Medical History      Diagnosis Date    Bipolar disorder ([address]     Cancer ([facility]     thoart     COPD (chronic obstructive pulmonary disease) ([facility]     Disease of blood and blood forming organ     GERD (PROVENTIL HFA) 108 (90 BASE) MCG/ACT inhaler, Inhale 2 puffs into the lungs every 4 hours as needed for Wheezing or Shortness of Breath (Space out to every 6 hours as symptoms improve) Space out to every 6 hours as symptoms improve. nystatin (MYCOSTATIN) 389282 UNIT/ML suspension, Take 5 mLs by mouth 4 times daily  Handicap Melody MISC, by Does not apply route Issued disability placard from August 21, 2017 till February 2018  Diet    DIET LOW SODIUM 2 GM; Allergies    Review of patient's allergies indicates no known allergies. Social History     Social History     Social History    Marital status: Single     Spouse name: N/A    Number of children: 3    Years of education: N/A     Occupational History    Not on file. Social History Main Topics    Smoking status: Current Every Day Smoker     Packs/day: 0.50     Years: 27.00     Types: Cigarettes    Smokeless tobacco: Never Used    Alcohol use 2.4 oz/week     4 Cans of beer per week      Comment: daily    Drug use: No    Sexual activity: Yes     Partners: Female     Other Topics Concern    Not on file     Social History Narrative     Family History          Problem Relation Age of Onset    COPD Father     Cancer Maternal Grandmother     Heart Attack Maternal Grandfather      [place] of systems   General/Constitutional: he lost ~20lbs of weight in the last 6months with decreased appetite. No fever or chills. HENT: Negative. Eyes: Negative. Upper respiratory tract: No nasal stuffiness or post nasal drip. Lower respiratory tract/ lungs: Frequent cough with thick sputum production. No hemoptysis. Cardiovascular: No palpitations or chest pain. Gastrointestinal: No nausea or vomiting. Poor appetite  Neurological: No focal neurologiacal weakness. Extremities: No edema. Musculoskeletal: No complaints. Genitourinary: No complaints. Hematological: Negative. Psychiatric/Behavioral: Negative. Skin: No itching.     Vitals     height is 6' (1.829 m) and weight is 120 lb (54.4 kg). His oral temperature is 98.2 °F (36.8 °C). His blood pressure is 115/69 and his pulse is 95. His respiration is 18 and oxygen saturation is 97%. Body mass index is 16.27 kg/(m^2). I/O    No intake or output data in the 24 hours ending 10/03/17 1907      Patient Vitals for the past 96 hrs (Last 3 readings):   Weight   10/03/17 1121 120 lb (54.4 kg)       Exam   General Appearance: Patient appears ill built and ill nourished in no acute distress  HEENT: Normal, Head is normocephalic, atraumatic. Oropharynx is clear and moist.  No oral thrush. PERRLA  Neck - Supple, No JVD present. No tracheal deviation present. S/p Trach with #[id number] with no balloon trach in place  Lungs - Bilateral air entry present. Diminished breath sounds at both bases. no wheezes, rales or rhonchi,   Cardiovascular - Heart sounds are normal.  Regular rhythm normal rate without murmur, gallop or rub. Abdomen - Soft, nontender, nondistended, no masses or organomegaly. S/p PEG in place. Neurologic - Awake, alert, oriented. There are no focal motor or sensory deficits  Extremities - No cyanosis, clubbing or edema. Musculoskeletal: Normal range of motion. Patient exhibits no tenderness. Lymphadenopathy:  No cervical adenopathy. Psychiatric: Patient  has a normal mood and affect. Skin - No bruising or bleeding.     Labs  - Old records and notes have been reviewed in [facility]   ABG  No results found for: PH, PO2, PCO2, HCO3, O2SAT  No results found for: IFIO2, MODE, SETTIDVOL, SETPEEP  CBC  Recent Labs      10/03/17   1130   WBC  5.2   RBC  3.93*   HGB  12.9*   HCT  38.2*   MCV  97.0*   MCH  32.7*   MCHC  33.7   RDW  17.1*   PLT  271   MPV  7.9      BMP  Recent Labs      10/03/17   1130   NA  133*   K  3.8   CL  96*   CO2  22*   BUN  6*   CREATININE  0.7   GLUCOSE  112*   CALCIUM  9.2     LFT  Recent Labs      10/03/17   1130   AST  21   ALT  13   BILITOT  0.2*   ALKPHOS  80     TROP  Lab Results Component Value Date    TROPONINT < 0.010 10/03/2017    TROPONINT < 0.010 05/11/2017    TROPONINT < 0.010 08/05/2016     BNP  No results for input(s): BNP in the last 72 hours. Lactic Acid  No results for input(s): LACTA in the last 72 hours. INR  No results for input(s): INR, PROTIME in the last 72 hours. PTT  No results for input(s): APTT in the last 72 hours. Glucose  No results for input(s): POCGLU in the last 72 hours. UA No results for input(s): SPECGRAV, PHUR, COLORU, CLARITYU, MUCUS, PROTEINU, BLOODU, RBCUA, WBCUA, BACTERIA, NITRU, GLUCOSEU, BILIRUBINUR, UROBILINOGEN, KETUA, LABCAST, LABCASTTY, AMORPHOS in the last 72 hours. Invalid input(s): CRYSTALS. Cultures    Blood cultures- pending    EKG     Sinus tachycardia  Biatrial enlargement  Rightward axis  Pulmonary disease pattern  Abnormal ECG  When compared with ECG of 20-JUN-2017 16:37,  No significant change was found           Echocardiogram   None. Radiology    CXR  Oct 3, 2017  PROCEDURE: XR CHEST STANDARD TWO VW  Mild accentuation of the bronchovascular markings, best demonstrated on the lateral view. Possible very tiny left pleural effusion posteriorly. CT Scans  (See actual reports for details)            Oct 3, 2017  PROCEDURE: CTA CHEST W WO CONTRAST  1. No CT evidence of pulmonary embolus. 2. There are retained mucous secretions and/or aspirated contents present within the bilateral lower lobe bronchi. The lower lobe bronchi are nearly entirely opacified. 3. Patchy groundglass and centrilobular nodular densities are demonstrated within the lower lobes bilaterally. This may represent sequela from aspiration pneumonia and/or sequela from an atypical mycobacterial infectious pneumonia. Recommend continued follow-up to document resolution. Assessment   Bilateral pneumonia due to CAP Vs aspiration Vs HCAP. Obstruction of air ways- most likely due to mucus plubs Vs endobronchial lesions less likely.   Chronic hx of tobacco smoking. He is still smoking 0.5PPD. Stage IV Epiglottic squamous cell carcinoma S/p Chemotherapy and radiation therapy. Severe protein malnutrition. Factor VIII deficiency    Plan   Keep NPO except meds from 8:00 Am [place] for bronch. Aspiration precautions. For bronch at 3:00pm [place] in [facility]. Robles Hogan and his mother were educated and informed about bronchoscopy procedure,method, complicantions of procedure including but not limited to development of pneumothorax with requirement of chest tube placement. He agreed for the procedure and verbalizes understanding. ENT consult for trach change. Continue current antibiotics. Danny Burris Gaineseducated about my impression and plan. He verbalizes understanding. \"Thank you for asking us to see this patient\"     Case discussed with nurse and patient/family. Questions and concerns addressed.     Electronically signed by   Raquel Romo MD on 10/3/2017 at 7:07 PM

## 2017-10-03 NOTE — ED PROVIDER NOTES
indicated that his mother is alive. He indicated that his father is . He indicated that the status of his maternal grandmother is unknown. He indicated that the status of his maternal grandfather is unknown.  family history includes COPD in his father; Cancer in his maternal grandmother; Heart Attack in his maternal grandfather. SOCIAL HISTORY      reports that he has been smoking Cigarettes. He has a 13.50 pack-year smoking history. He has never used smokeless tobacco. He reports that he drinks about 2.4 oz of alcohol per week  He reports that he does not use illicit drugs. PHYSICAL EXAM     INITIAL VITALS:  height is 6' (1.829 m) and weight is 120 lb (54.4 kg). His oral temperature is 98 °F (36.7 °C). His blood pressure is 142/90 (abnormal) and his pulse is 107. His respiration is 18 and oxygen saturation is 90%. Physical Exam   Constitutional: He is oriented to person, place, and time. He appears well-developed and well-nourished. Patient had a tracheotomy tube implanted, patient was emaciated,   HENT:   Head: Normocephalic and atraumatic. Right Ear: External ear normal.   Left Ear: External ear normal.   Mouth/Throat: Oropharynx is clear and moist.   Decreased ear movement   Eyes: Conjunctivae are normal. Right eye exhibits no discharge. Left eye exhibits no discharge. No scleral icterus. Neck: Normal range of motion. Neck supple. No JVD present. Cardiovascular: Tachycardia present. Pulmonary/Chest: Effort normal. No stridor. No respiratory distress. He has rhonchi (mild bilateral rhonchi). Abdominal: Soft. He exhibits no distension. Patient had a peg tube   Musculoskeletal: Normal range of motion. He exhibits no edema. Neurological: He is alert and oriented to person, place, and time. He exhibits normal muscle tone. GCS eye subscore is 4. GCS verbal subscore is 5. GCS motor subscore is 6. Skin: Skin is warm and dry. He is not diaphoretic. No erythema.    Psychiatric: He has (*)     MCV 97.0 (*)     MCH 32.7 (*)     RDW 17.1 (*)     Lymphocytes # 0.6 (*)     All other components within normal limits   D-DIMER, QUANTITATIVE - Abnormal; Notable for the following:     D-Dimer, Quant 619.00 (*)     All other components within normal limits   COMPREHENSIVE METABOLIC PANEL - Abnormal; Notable for the following:     Glucose 112 (*)     BUN 6 (*)     Sodium 133 (*)     Chloride 96 (*)     CO2 22 (*)     Total Bilirubin 0.2 (*)     All other components within normal limits   OSMOLALITY - Abnormal; Notable for the following:     Osmolality Calc 264.7 (*)     All other components within normal limits   TROPONIN   TSH WITHOUT REFLEX   ANION GAP   GLOMERULAR FILTRATION RATE, ESTIMATED       EMERGENCY DEPARTMENT COURSE:   Vitals:    Vitals:    10/03/17 1121 10/03/17 1319 10/03/17 1438   BP: (!) 130/98 (!) 140/90 (!) 142/90   Pulse: 122 120 107   Resp: 16 18 18   Temp: 98 °F (36.7 °C)     TempSrc: Oral     SpO2: (!) 85% 96% 90%   Weight: 120 lb (54.4 kg)     Height: 6' (1.829 m)         11:42 AM: The patient was seen and evaluated. MDM:  Patient was given breathing treatment and IV fluids in the emergency department Patient's CT scan of the chest was done to rule out any pulmonary embolism and was negative, place and other labs were also reviewed. From the CT scan it looks like patient is unable to get rid of his secretions which is contributing to his hypoxia along with COPD. Patient will be admitted by hospitalist for further workup and management. All of patient's lab the testing that reviewed discussed with the patient also with the admitting physician and the family. CRITICAL CARE:        CONSULTS:  Dr. Bryan Conway:       FINAL IMPRESSION      1. Chronic obstructive pulmonary disease, unspecified COPD type ([facility]          DISPOSITION/PLAN   Admit    PATIENT REFERRED TO:  No follow-up provider specified.     DISCHARGE MEDICATIONS:  New Prescriptions    No medications on file (Please note that portions of this note were completed with a voice recognition program.  Efforts were made to edit the dictations but occasionally words are mis-transcribed.)    Scribe:  Ehsan Rosales 10/3/17 11:42 AM Scribing for and in the presence of Wolfgang Hutchison MD.    Signed by: Adair Landin, 10/03/17 3:42 PM    Provider:  I personally performed the services described in the documentation, reviewed and edited the documentation which was dictated to the scribe in my presence, and it accurately records my words and actions.     Wolfgang Hutchison MD 10/3/17 3:42 PM         Wolfgang Hutchison MD  10/03/17 1542

## 2017-10-03 NOTE — PROGRESS NOTES
Pharmacy Medication History Note      List of current medications patient is taking is complete. Source of information: Patient and girlfriend    Changes made to medication list:  Medications removed (include reason, ex. therapy complete or physician discontinued):  · cephalexin  · Fentanyl 25 mcg/hr patch    Medications added/doses adjusted:  · Docusate prn    Other notes (ex. Recent course of antibiotics, Coumadin dosing):  · Fentanyl patch put on yesterday 10/2  · Denies use of other OTC or herbal medications.       Allergies reviewed      Electronically signed by Ciro Manuel [facility] on 10/3/2017 at 2:28 PM

## 2017-10-03 NOTE — ED NOTES
Patient resting with eyes closed, respirations are easy and unlabored. Updated family on POC. Patient's family denies of any needs or concerns at this time.       Cathi Bacon RN  10/03/17 8742

## 2017-10-03 NOTE — ED TRIAGE NOTES
Patient arrived by [facility] for c/o tachycardia. Patient stated this started a couple of days ago with no chest pain noted. Patient stated that he does have anxiety and is feeling anxious now.  Patient stated he feels like his heart is pounding in his chest.

## 2017-10-04 ENCOUNTER — HOSPITAL ENCOUNTER (OUTPATIENT)
Dept: INFUSION THERAPY | Age: 47
End: 2017-10-04
Payer: MEDICARE

## 2017-10-04 PROBLEM — E43 SEVERE MALNUTRITION (HCC): Chronic | Status: ACTIVE | Noted: 2017-10-04

## 2017-10-04 PROBLEM — J96.01 ACUTE HYPOXEMIC RESPIRATORY FAILURE (HCC): Status: ACTIVE | Noted: 2017-10-04

## 2017-10-04 LAB
ANION GAP SERPL CALCULATED.3IONS-SCNC: 12 MEQ/L (ref 8–16)
ANISOCYTOSIS: ABNORMAL
BASOPHILS # BLD: 0.8 %
BASOPHILS ABSOLUTE: 0 THOU/MM3 (ref 0–0.1)
BUN BLDV-MCNC: 6 MG/DL (ref 7–22)
CALCIUM SERPL-MCNC: 9.1 MG/DL (ref 8.5–10.5)
CHLORIDE BLD-SCNC: 100 MEQ/L (ref 98–111)
CO2: 24 MEQ/L (ref 23–33)
CREAT SERPL-MCNC: 0.6 MG/DL (ref 0.4–1.2)
CYTOLOGY-NON GYN: NORMAL
EOSINOPHIL # BLD: 3.3 %
EOSINOPHILS ABSOLUTE: 0.1 THOU/MM3 (ref 0–0.4)
GFR SERPL CREATININE-BSD FRML MDRD: > 90 ML/MIN/1.73M2
GLUCOSE BLD-MCNC: 71 MG/DL (ref 70–108)
HCT VFR BLD CALC: 32.8 % (ref 42–52)
HEMOGLOBIN: 11.2 GM/DL (ref 14–18)
LYMPHOCYTES # BLD: 12.7 %
LYMPHOCYTES ABSOLUTE: 0.6 THOU/MM3 (ref 1–4.8)
MCH RBC QN AUTO: 33.7 PG (ref 27–31)
MCHC RBC AUTO-ENTMCNC: 34.3 GM/DL (ref 33–37)
MCV RBC AUTO: 98.3 FL (ref 80–94)
MODIFIED ACID FAST SMEAR: NORMAL
MONOCYTES # BLD: 13.2 %
MONOCYTES ABSOLUTE: 0.6 THOU/MM3 (ref 0.4–1.3)
NUCLEATED RED BLOOD CELLS: 0 /100 WBC
PDW BLD-RTO: 15.8 % (ref 11.5–14.5)
PLATELET # BLD: 196 THOU/MM3 (ref 130–400)
PMV BLD AUTO: 8.4 MCM (ref 7.4–10.4)
POTASSIUM SERPL-SCNC: 3.9 MEQ/L (ref 3.5–5.2)
RBC # BLD: 3.34 MILL/MM3 (ref 4.7–6.1)
RBC # BLD: NORMAL 10*6/UL
SEG NEUTROPHILS: 70 %
SEGMENTED NEUTROPHILS ABSOLUTE COUNT: 3.1 THOU/MM3 (ref 1.8–7.7)
SODIUM BLD-SCNC: 136 MEQ/L (ref 135–145)
WBC # BLD: 4.4 THOU/MM3 (ref 4.8–10.8)

## 2017-10-04 PROCEDURE — 87184 SC STD DISK METHOD PER PLATE: CPT

## 2017-10-04 PROCEDURE — 1200000003 HC TELEMETRY R&B

## 2017-10-04 PROCEDURE — 85025 COMPLETE CBC W/AUTO DIFF WBC: CPT

## 2017-10-04 PROCEDURE — 31645 BRNCHSC W/THER ASPIR 1ST: CPT | Performed by: INTERNAL MEDICINE

## 2017-10-04 PROCEDURE — 7100000001 HC PACU RECOVERY - ADDTL 15 MIN: Performed by: INTERNAL MEDICINE

## 2017-10-04 PROCEDURE — A7520 TRACH/LARYN TUBE NON-CUFFED: HCPCS

## 2017-10-04 PROCEDURE — 88108 CYTOPATH CONCENTRATE TECH: CPT

## 2017-10-04 PROCEDURE — G8978 MOBILITY CURRENT STATUS: HCPCS

## 2017-10-04 PROCEDURE — 99232 SBSQ HOSP IP/OBS MODERATE 35: CPT | Performed by: INTERNAL MEDICINE

## 2017-10-04 PROCEDURE — 6360000002 HC RX W HCPCS: Performed by: INTERNAL MEDICINE

## 2017-10-04 PROCEDURE — 0BC58ZZ EXTIRPATION OF MATTER FROM RIGHT MIDDLE LOBE BRONCHUS, VIA NATURAL OR ARTIFICIAL OPENING ENDOSCOPIC: ICD-10-PCS | Performed by: INTERNAL MEDICINE

## 2017-10-04 PROCEDURE — 99999 PR OFFICE/OUTPT VISIT,PROCEDURE ONLY: CPT | Performed by: INTERNAL MEDICINE

## 2017-10-04 PROCEDURE — 80048 BASIC METABOLIC PNL TOTAL CA: CPT

## 2017-10-04 PROCEDURE — 0BC88ZZ EXTIRPATION OF MATTER FROM LEFT UPPER LOBE BRONCHUS, VIA NATURAL OR ARTIFICIAL OPENING ENDOSCOPIC: ICD-10-PCS | Performed by: INTERNAL MEDICINE

## 2017-10-04 PROCEDURE — 6370000000 HC RX 637 (ALT 250 FOR IP): Performed by: INTERNAL MEDICINE

## 2017-10-04 PROCEDURE — 99153 MOD SED SAME PHYS/QHP EA: CPT | Performed by: INTERNAL MEDICINE

## 2017-10-04 PROCEDURE — 7100000000 HC PACU RECOVERY - FIRST 15 MIN: Performed by: INTERNAL MEDICINE

## 2017-10-04 PROCEDURE — 2700000000 HC OXYGEN THERAPY PER DAY

## 2017-10-04 PROCEDURE — 0BC78ZZ EXTIRPATION OF MATTER FROM LEFT MAIN BRONCHUS, VIA NATURAL OR ARTIFICIAL OPENING ENDOSCOPIC: ICD-10-PCS | Performed by: INTERNAL MEDICINE

## 2017-10-04 PROCEDURE — 6370000000 HC RX 637 (ALT 250 FOR IP)

## 2017-10-04 PROCEDURE — 87081 CULTURE SCREEN ONLY: CPT

## 2017-10-04 PROCEDURE — 99152 MOD SED SAME PHYS/QHP 5/>YRS: CPT | Performed by: INTERNAL MEDICINE

## 2017-10-04 PROCEDURE — 97162 PT EVAL MOD COMPLEX 30 MIN: CPT

## 2017-10-04 PROCEDURE — 87116 MYCOBACTERIA CULTURE: CPT

## 2017-10-04 PROCEDURE — 77300 RADIATION THERAPY DOSE PLAN: CPT | Performed by: RADIOLOGY

## 2017-10-04 PROCEDURE — 87186 SC STD MICRODIL/AGAR DIL: CPT

## 2017-10-04 PROCEDURE — 3609027000 HC BRONCHOSCOPY: Performed by: INTERNAL MEDICINE

## 2017-10-04 PROCEDURE — 2580000003 HC RX 258: Performed by: INTERNAL MEDICINE

## 2017-10-04 PROCEDURE — 2500000003 HC RX 250 WO HCPCS

## 2017-10-04 PROCEDURE — 94760 N-INVAS EAR/PLS OXIMETRY 1: CPT

## 2017-10-04 PROCEDURE — 36415 COLL VENOUS BLD VENIPUNCTURE: CPT

## 2017-10-04 PROCEDURE — 87070 CULTURE OTHR SPECIMN AEROBIC: CPT

## 2017-10-04 PROCEDURE — 0BCB8ZZ EXTIRPATION OF MATTER FROM LEFT LOWER LOBE BRONCHUS, VIA NATURAL OR ARTIFICIAL OPENING ENDOSCOPIC: ICD-10-PCS | Performed by: INTERNAL MEDICINE

## 2017-10-04 PROCEDURE — 87205 SMEAR GRAM STAIN: CPT

## 2017-10-04 PROCEDURE — 87206 SMEAR FLUORESCENT/ACID STAI: CPT

## 2017-10-04 PROCEDURE — 88312 SPECIAL STAINS GROUP 1: CPT

## 2017-10-04 PROCEDURE — 99233 SBSQ HOSP IP/OBS HIGH 50: CPT | Performed by: INTERNAL MEDICINE

## 2017-10-04 PROCEDURE — 0BC38ZZ EXTIRPATION OF MATTER FROM RIGHT MAIN BRONCHUS, VIA NATURAL OR ARTIFICIAL OPENING ENDOSCOPIC: ICD-10-PCS | Performed by: INTERNAL MEDICINE

## 2017-10-04 PROCEDURE — 0BC68ZZ EXTIRPATION OF MATTER FROM RIGHT LOWER LOBE BRONCHUS, VIA NATURAL OR ARTIFICIAL OPENING ENDOSCOPIC: ICD-10-PCS | Performed by: INTERNAL MEDICINE

## 2017-10-04 PROCEDURE — 88112 CYTOPATH CELL ENHANCE TECH: CPT

## 2017-10-04 PROCEDURE — 77386 HC NTSTY MODUL RAD TX DLVR CPLX: CPT | Performed by: RADIOLOGY

## 2017-10-04 PROCEDURE — 87255 GENET VIRUS ISOLATE HSV: CPT

## 2017-10-04 PROCEDURE — 87102 FUNGUS ISOLATION CULTURE: CPT

## 2017-10-04 PROCEDURE — 0BC48ZZ EXTIRPATION OF MATTER FROM RIGHT UPPER LOBE BRONCHUS, VIA NATURAL OR ARTIFICIAL OPENING ENDOSCOPIC: ICD-10-PCS | Performed by: INTERNAL MEDICINE

## 2017-10-04 PROCEDURE — G8979 MOBILITY GOAL STATUS: HCPCS

## 2017-10-04 RX ORDER — FENTANYL CITRATE 50 UG/ML
INJECTION, SOLUTION INTRAMUSCULAR; INTRAVENOUS PRN
Status: DISCONTINUED | OUTPATIENT
Start: 2017-10-04 | End: 2017-10-04 | Stop reason: HOSPADM

## 2017-10-04 RX ORDER — FENTANYL CITRATE 50 UG/ML
50 INJECTION, SOLUTION INTRAMUSCULAR; INTRAVENOUS
Status: DISCONTINUED | OUTPATIENT
Start: 2017-10-04 | End: 2017-10-04

## 2017-10-04 RX ORDER — MIDAZOLAM HYDROCHLORIDE 1 MG/ML
INJECTION INTRAMUSCULAR; INTRAVENOUS PRN
Status: DISCONTINUED | OUTPATIENT
Start: 2017-10-04 | End: 2017-10-04 | Stop reason: HOSPADM

## 2017-10-04 RX ORDER — LORAZEPAM 2 MG/ML
1 INJECTION INTRAMUSCULAR ONCE
Status: COMPLETED | OUTPATIENT
Start: 2017-10-04 | End: 2017-10-04

## 2017-10-04 RX ADMIN — LORAZEPAM 1 MG: 2 INJECTION INTRAMUSCULAR; INTRAVENOUS at 09:32

## 2017-10-04 RX ADMIN — VANCOMYCIN HYDROCHLORIDE 1000 MG: 1 INJECTION, POWDER, LYOPHILIZED, FOR SOLUTION INTRAVENOUS at 06:59

## 2017-10-04 RX ADMIN — PIPERACILLIN SODIUM,TAZOBACTAM SODIUM 3.38 G: 3; .375 INJECTION, POWDER, FOR SOLUTION INTRAVENOUS at 13:37

## 2017-10-04 RX ADMIN — NYSTATIN 500000 UNITS: 100000 SUSPENSION ORAL at 21:11

## 2017-10-04 RX ADMIN — PIPERACILLIN SODIUM,TAZOBACTAM SODIUM 3.38 G: 3; .375 INJECTION, POWDER, FOR SOLUTION INTRAVENOUS at 06:05

## 2017-10-04 RX ADMIN — LANSOPRAZOLE 30 MG: 30 TABLET, ORALLY DISINTEGRATING, DELAYED RELEASE ORAL at 08:55

## 2017-10-04 RX ADMIN — PIPERACILLIN SODIUM,TAZOBACTAM SODIUM 3.38 G: 3; .375 INJECTION, POWDER, FOR SOLUTION INTRAVENOUS at 23:25

## 2017-10-04 RX ADMIN — VANCOMYCIN HYDROCHLORIDE 1000 MG: 1 INJECTION, POWDER, LYOPHILIZED, FOR SOLUTION INTRAVENOUS at 21:00

## 2017-10-04 RX ADMIN — Medication 6.25 MG: at 02:20

## 2017-10-04 RX ADMIN — SODIUM CHLORIDE: 9 INJECTION, SOLUTION INTRAVENOUS at 17:58

## 2017-10-04 ASSESSMENT — PAIN SCALES - GENERAL
PAINLEVEL_OUTOF10: 0
PAINLEVEL_OUTOF10: 6
PAINLEVEL_OUTOF10: 6
PAINLEVEL_OUTOF10: 0
PAINLEVEL_OUTOF10: 0

## 2017-10-04 ASSESSMENT — PAIN DESCRIPTION - PAIN TYPE: TYPE: ACUTE PAIN

## 2017-10-04 ASSESSMENT — PAIN DESCRIPTION - ORIENTATION: ORIENTATION: LEFT

## 2017-10-04 ASSESSMENT — PAIN DESCRIPTION - DESCRIPTORS: DESCRIPTORS: SHARP;ACHING

## 2017-10-04 ASSESSMENT — PAIN DESCRIPTION - FREQUENCY: FREQUENCY: INTERMITTENT

## 2017-10-04 ASSESSMENT — PAIN DESCRIPTION - LOCATION: LOCATION: NECK

## 2017-10-04 NOTE — CARE COORDINATION
10/4/17, 11:12 AM      Jhonny Red       Admitted from: ER 10/3/2017/ 1131 No. [place] day: 1   Location: [address] [place] [address] Reason for admit: Pneumonia [J18.9] Status: inpt  Admit order signed?: yes  PMH:  has a past medical history of Bipolar disorder ([facility] Cancer [facility]); COPD (chronic obstructive pulmonary disease) ([facility] Disease of blood and blood forming organ; GERD (gastroesophageal reflux disease); History of blood transfusion; and Pneumonia of both lungs due to group B Streptococcus ([facility] Procedure: trach care  Pertinent abnormal Imaging:CTA Chest:   No CT evidence of pulmonary embolus. 2. There are retained mucous secretions and/or aspirated contents present within the bilateral lower lobe bronchi. The lower lobe bronchi are nearly entirely opacified. 3. Patchy groundglass and centrilobular nodular densities are demonstrated within the lower lobes bilaterally. This may represent sequela from aspiration pneumonia and/or sequela from an atypical mycobacterial infectious pneumonia. Recommend continued    follow-up to document resolution.           Medications:  Scheduled Meds:   docusate  100 mg Oral TID    [START ON 10/5/2017] fentaNYL  1 patch Transdermal Q72H    nystatin  500,000 Units Mouth/Throat 4x Daily    traZODone  50 mg Oral Nightly    sodium chloride flush  10 mL Intravenous 2 times per day    piperacillin-tazobactam  3.375 g Intravenous Q8H    lansoprazole  30 mg Oral QAM AC    vancomycin (VANCOCIN) intermittent dosing (placeholder)   Other RX Placeholder    vancomycin  1,000 mg Intravenous Q12H     Continuous Infusions:   sodium chloride 100 mL/hr at 10/03/17 1941      Pertinent Info/Orders/Treatment Plan: bronchoscopy today, patient went to radiation treatment, OT,PT, inpt pulmonology, consult oncology. Trach Care  Diet: Diet NPO Time Specified Exceptions are: Sips with Meds   DVT Prophylaxis: SCD's ordered  Smoking status:  reports that he has been smoking Cigarettes.   He has a 13.50

## 2017-10-04 NOTE — PROGRESS NOTES
Perfect serve message sent to Dr. Alda Johnson about patient wanting home dose of Sublingual Fentanyl. Waiting for response.

## 2017-10-04 NOTE — PROGRESS NOTES
Call placed to Dr. Oleg Olmstead about patient requesting home medication of Sublingual Fentanyl. Dr. Oleg Olmstead stated she would look at patients medications.

## 2017-10-04 NOTE — CARE COORDINATION
DISCHARGE BARRIERS  10/4/17, 2:32 PM    Reason for Referral: please assess for discharge needs. Mental Status: alert and oriented per nursing notes. Patient currently sleeping. Decision Making: patient is able to make his own decisions. Family/Social/Home Environment: Assessment completed with spouse (appears to be in good health). Patient's mother and adult daughter also present. Patient and spouse reside in a 2 story home. They have 2 grandsons (ages 11 & 3) who reside with them. There are 11 stairs with hand rail to enter from the front and 4 steps with handrail to enter from the back. Daughter states that they usually enter and exit the home from the back. Main bathroom is located on the second floor and has a tub/shower combination. Spouse states that patient had difficulty using the stairs (fatigue) and has opted to use half bath on the first floor and does a sponge bath. Bedroom is on the first floor. However, patient chooses to sleep in a recliner in the \"front room\". Spouse manages all household needs and transports patient to his appointments. Patient has stage IV throat cancer (diagnosed in July?). According to spouse, he received a trach and peg tube at [facility] in July. He is followed by Dr. Merlinda Corrente locally. Spouse states that he receives chemo every 3 weeks and has 1 treatment left. He has also completed 20 of 35 radiation treatments. Current Services: none PTA-spouse states that patient had [facility] after receiving trach. However, they dropped patient from service when he refused to go to hospital when he was bleeding from his throat and because spouse did not return calls although she states she never received messages from them. Spouse reported that patient only allowed home health staff to do vitals-she did everything else (trach and peg care).    Current Equipment: Humidified air to trach (DASCO) which spouse states patient refuses to use-suction machine supplied by [facility], standard walker (spouse

## 2017-10-04 NOTE — PROGRESS NOTES
[facility]    Patient - Yole Matthews   MRN -  [id number]   Henri[place] # - [de-identified]   - 1970      Date of Admission -  10/3/2017 11:11 AM  Date of evaluation -  10/4/2017  Room - [facility] [address], MD Primary Care Physician - No primary care provider on file. 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 S/p trach  HPI   History Obtained From: Patient  and electronic medical record. Yoel Matthews is a 40 y.o. 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 He used to follow with Dr. Mary Anne Restrepo. He was referred to [facility]. He underwent tracheostomy, esophagoscopy at [facility] along with PEG placement. The biopsy of Epiglottic mass and left base of tongue showed invasive squamous cell carcinoma. He is currently following with Dr. Buddy Gregory.     He was admitted under hospitalist service. Pulmonary medicine was consulted for further management of bilateral pneumonia. Subjective/Events Past 24 hours/ROS   He is waiting for trach change by ENT today. Afebrile. No chest pain. For swallow test by speech path today after bronch. -Rest of the body systems were reviewed.      PMHx   Past Medical History      Diagnosis Date    [facility] disorder ([facility]     Cancer ([facility]     thoart     [facility]     Disease of blood and blood forming organ     GERD (gastroesophageal reflux disease)     History of blood transfusion     Pneumonia of both lungs due to group B Streptococcus ([facility]) 10/3/2017        Meds    Current Medications    docusate  100 mg Oral TID    [START ON 10/5/2017] fentaNYL  1 patch Transdermal Q72H    nystatin  500,000 Units Mouth/Throat 4x Daily    traZODone  50 mg Oral Nightly    sodium chloride flush  10 mL Intravenous 2 times per day    piperacillin-tazobactam  3.375 g Intravenous Q8H    lansoprazole  30 mg Oral QAM AC    vancomycin (VANCOCIN) intermittent dosing (placeholder)   Other RX Placeholder    vancomycin  1,000 mg Intravenous Q12H     Fentanyl Spray 800 mcg/actuation, albuterol sulfate HFA, ondansetron, promethazine, sennosides, sodium chloride flush, acetaminophen, magnesium hydroxide  IV Drips/Infusions   sodium chloride 100 mL/hr at 10/03/17 1941     Home Medications  Prescriptions Prior to Admission: omeprazole (PRILOSEC) 2 MG/ML SUSP 2 mg/mL oral suspension, Take 20 mLs by mouth Daily  traZODone (DESYREL) 50 MG tablet, Take 1 tablet by mouth nightly  promethazine (PHENERGAN) 6.25 MG/5ML syrup, Take 6.25 mg by mouth 4 times daily as needed for Nausea  potassium chloride (MICRO-K) 10 MEQ extended release capsule, Take 10 mEq by mouth 3 times daily  fentaNYL (DURAGESIC) 75 MCG/HR, Place 1 patch onto the skin every 72 hours  CANCER.   fentaNYL (SUBSYS) 800 MCG LIQD, Place 1 spray under the tongue every 6 hours as needed for Pain .  sennosides (SENOKOT) 8.8 MG/5ML syrup, Take 5 mLs by mouth daily as needed (For constipation)  docusate sodium (COLACE) 150 MG/15ML liquid, Take 10 mLs by mouth 3 times daily (Patient taking differently: Take 100 mg by mouth 3 times daily as needed (constipation) )  ondansetron (ZOFRAN ODT) 4 MG disintegrating tablet, Place 1 tablet under your tongue every 8 hours  albuterol sulfate HFA (PROVENTIL HFA) 108 (90 BASE) MCG/ACT inhaler, Inhale 2 puffs into the lungs every 4 hours as needed for Wheezing or Shortness of Breath (Space out to every 6 hours as symptoms improve) Space out to every 6 hours as symptoms improve. nystatin (MYCOSTATIN) 393738 UNIT/ML suspension, Take 5 mLs by mouth 4 times daily  Handicap Placard MISC, by Does not apply route Issued disability placard from August 21, 2017 till February 2018  Diet    Diet NPO Time Specified Exceptions are: Sips with Meds  Allergies    Review of patient's allergies indicates no known allergies. Vitals     height is 6' (1.829 m) and weight is 120 lb (54.4 kg). His temperature is 61.3 °F (16.3 °C) (abnormal). His blood pressure is 118/72 and his pulse is 75. His respiration is 18 and oxygen saturation is 98%. Body mass index is 16.27 kg/(m^2). I/O        Intake/Output Summary (Last 24 hours) at 10/04/17 1118  Last data filed at 10/04/17 0759   Gross per 24 hour   Intake             1800 ml   Output             1100 ml   Net              700 ml     I/O last 3 completed shifts: In: 1800 [P.O.:600; I.V.:1200]  Out: 700 [Urine:700]   Patient Vitals for the past 96 hrs (Last 3 readings):   Weight   10/03/17 1121 120 lb (54.4 kg)       Exam   Constitutional: Patient appears in no distress. Mouth/Throat: Oropharynx is clear and moist.  No oral thrush. Neck: Neck supple. S/p trach with #6CFS with inner diameter of 6.4 and outer diameter of 10.8  Cardiovascular: S1 and S2 heard. No murmurs. Pulmonary/Chest: Bilateral air entry present. Diminished breath sounds at bases. No wheezes. No rales. Abdominal: Soft. No tenderness. Extremities: Patient exhibits no edema. Neurological: Patient is awake and alert.   Labs  - Old records and notes have been reviewed in [facility]   ABG  No results found for: PH, PO2, PCO2, HCO3, O2SAT  No results found for: IFIO2, MODE, SETTIDVOL, SETPEEP  CBC  Recent Labs 10/03/17   1130  10/04/17   0549   WBC  5.2  4.4*   RBC  3.93*  3.34*   HGB  12.9*  11.2*   HCT  38.2*  32.8*   MCV  97.0*  98.3*   MCH  32.7*  33.7*   MCHC  33.7  34.3   RDW  17.1*  15.8*   PLT  271  196   MPV  7.9  8.4      BMP  Recent Labs      10/03/17   1130  10/04/17   0549   NA  133*  136   K  3.8  3.9   CL  96*  100   CO2  22*  24   BUN  6*  6*   CREATININE  0.7  0.6   GLUCOSE  112*  71   CALCIUM  9.2  9.1     LFT  Recent Labs      10/03/17   1130   AST  21   ALT  13   BILITOT  0.2*   ALKPHOS  80     TROP  Lab Results   Component Value Date    TROPONINT < 0.010 10/03/2017    TROPONINT < 0.010 05/11/2017    TROPONINT < 0.010 08/05/2016     BNP  No results for input(s): BNP in the last 72 hours. Lactic Acid  No results for input(s): LACTA in the last 72 hours. INR  Recent Labs      10/03/17   2012   INR  0.99     PTT  No results for input(s): APTT in the last 72 hours. Glucose  No results for input(s): POCGLU in the last 72 hours. UA No results for input(s): SPECGRAV, PHUR, COLORU, CLARITYU, MUCUS, PROTEINU, BLOODU, RBCUA, WBCUA, BACTERIA, NITRU, GLUCOSEU, BILIRUBINUR, UROBILINOGEN, KETUA, LABCAST, LABCASTTY, AMORPHOS in the last 72 hours. Invalid input(s): CRYSTALS. Cultures    Blood cultures- pending    EKG     Sinus tachycardia  Biatrial enlargement  Rightward axis  Pulmonary disease pattern  Abnormal ECG  When compared with ECG of 20-JUN-2017 16:37,  No significant change was found           Echocardiogram   None. Radiology    CXR  Oct 3, 2017  PROCEDURE: XR CHEST STANDARD TWO VW  Mild accentuation of the bronchovascular markings, best demonstrated on the lateral view. Possible very tiny left pleural effusion posteriorly. CT Scans  (See actual reports for details)            Oct 3, 2017  PROCEDURE: CTA CHEST W WO CONTRAST  1. No CT evidence of pulmonary embolus. 2. There are retained mucous secretions and/or aspirated contents present within the bilateral lower lobe bronchi.  The lower

## 2017-10-04 NOTE — PROGRESS NOTES
RLE AROM: WFL         LLE AROM : WFL          Strength RLE: Exception  Comment: 4/5    Strength LLE: Exception  Comment: 4/5             Balance  Sitting - Static: Good  Sitting - Dynamic: Good  Standing - Static: Good, -  Standing - Dynamic: Fair, +    Supine to Sit: Modified independent (HOB flat, no bedrail)  Sit to Supine: Modified independent (HOB flat)    Transfers  Sit to Stand: Stand by assistance (from bed, good technique )  Stand to sit: Stand by assistance (of 1 to bed)       Ambulation 1  Surface: level tile  Device: No Device  Assistance: Contact guard assistance  Quality of Gait: declined further due to fatigue, no shaking spells this date , mild unsteadiness, no LOB. small slow steps , forward flexed steps   Distance: 20 feet  Exercises:  Comments: 10 reps long arc quads, marches, df/pf, and supine hip abd/add and S. L.R's to improve strength for function          Activity Tolerance:  Activity Tolerance: Patient limited by fatigue;Patient limited by endurance    Treatment Initiated: see there ex    Assessment: Body structures, Functions, Activity limitations: Decreased endurance, Decreased strength, Decreased functional mobility , Decreased balance  Assessment: Pt with generalized weakness and shaking spells intermittently when up that causes pt to fall. Pt has decreased endurance  due to radiation and chemo. Pt requires PT for strengthening and endurance to reduce risk for falls . Pt may need AD at discharge as well. Prognosis: Good    Clinical Presentation: Moderate - Evolving with Changing Characteristics: Pt with multiple comorbidities, h/o falls due to weakness, decreased endurance and shaking spells . Pt requires PT to improve strength and reduce risk for falls     Decision Making:  Moderate Complexitybased on patient assessment and decision making process of determining plan of care and establishing reasonable expectations for measurable functional outcomes    REQUIRES PT FOLLOW UP:

## 2017-10-04 NOTE — PROGRESS NOTES
Dr. Mara Balbuena returned call. Asked Dr. Mara Balbuena if she had looked at the patients medications, and the sublingual fentanyl that the patient takes at home because the patient is requesting a dose of the sublingual fentanyl now.

## 2017-10-04 NOTE — PLAN OF CARE
Problem: Pain:  Goal: Pain level will decrease  Pain level will decrease   Outcome: Ongoing  Patient has chronic pain. Patient has fentanyl patch in place, plus take sublingual fentanyl PRN. Problem: Respiratory  Goal: No pulmonary complications  Outcome: Ongoing  Patient has Cleatus Bal in place. Patients pulse ox is 92% on trach mask. Comments:   Care plan reviewed with patient. Patient verbalizes understanding of the plan of care and contribute to goal setting.

## 2017-10-04 NOTE — PROGRESS NOTES
[facility]  OCCUPATIONAL THERAPY MISSED TREATMENT NOTE  ACUTE CARE    Date: 10/4/2017  Patient Name: Remedios Mir        CSN: [id number]   : 1970  (40 y.o.)  Gender: male   Referring Practitioner: Dr. Marlon Stahl MD  Diagnosis: Pneumonia         REASON FOR MISSED TREATMENT:  Patient refused treatment. ATTN x 1- pt was anxious regarding a scheduled bronchoscopy this afternoon. He discussed having his trach changed with pulmonologist.  Pt also reported pain in his throat. His nurse was notified that the had asked for his pain medication. Will retry later this morning. Pt was not seen in OT this AM.  He was taken for the bronchoscopy early this afternoon. Will see tomorrow.

## 2017-10-04 NOTE — PROGRESS NOTES
Dr. Ketan Lara called for new consult to replace trach tube. Dr Ketan Lara to stated to call respiratory as they are the ones who usually replace the trach. If respiratory has any problems they can call Dr. Ketan Lara.

## 2017-10-04 NOTE — H&P
disorder. PAST MEDICAL HISTORY:  COPD and GERD. PAST SURGICAL HISTORY:  Tonsillectomy, adenoidectomy, G-tube, and now  the recent throat surgery and trach. SOCIAL HISTORY:  He drinks about 5 beers a week. He denies having  ever used smokeless tobacco.    FAMILY HISTORY:  Positive for COPD in the father, and maternal  grandfather and grandmother both had heart attack and cancer  respectively. The patient himself denies any history of chest pains  or cardiac disease etc.  He admits that he has copious gurgling mucous  secretions that he cannot get rid of and it has been going on for a  few days, the color is green-brown. He has had some fever and also  some chills too. Also known history of diabetes. This is a slight thin gentleman of 46, well nourished, in some  distressed breathing. History obtained from the patient's wife,  chart, and discussion with ER physician. REVIEW OF SYSTEMS:  GENERAL:  Positive for less activity, fatigue, and weight change. He  keeps losing weight. HEENT:  Dental problem, sore throat, difficulty swallowing, and voice  change. He can hardly get any words out. RESPIRATORY:  Shortness of breath. ABDOMEN:  Constipation. MUSCULOSKELETAL:  Joint pain, these come and go, first noted by Dr. Aissatou Loving early last month. PHYSICAL EXAMINATION:  GENERAL:  Thin, weak, poor color with O2 off and improved with O2 on,  however, he has trach orifice. CHEST:  Very rattly chest with cough. Exam of the lungs are very  nice, very rattly, not tight. Rales and rhonchi diffusely. NECK:  Some neck nodes, more on the left side than the right. These  were described about a month ago by Dr. Aissatou Loving. HEART:  Heart sounds regular, barely audible. Heart rate in the low  110s. CHEST:  He has some gynecomastia bilaterally noted. ABDOMEN:  Soft and nontender. Feeding tube and gastric tube in place. No obvious infections around the site of the tube. RECTAL EXAM:  Deferred.   EXTREMITIES: No edema. Positive clubbing of fingertips. SKIN:  No obvious skin lesions in upper and lower extremities. Poor  color. HOME MEDICATION LIST:  Prilosec, Desyrel 50 at night, Phenergan  p.r.n., KCl 10 three times a day, fentanyl transdermal and liquid,  Senokot, docusate, Zofran ODT, albuterol, and nystatin 4 times daily. LABORATORY DATA:  CT of the chest shows aspirated contents of mucus  secretions, bilateral lower lobe rhonchi, and nearly completely  opacified patchy ground-glass centrilobular nodular densities also  demonstrated in the lower lobes bilateral may be due to atypical  mycobacterial infection pneumonia and the shape of the chest would  suggest hyperinflation. White count is 5200, hemoglobin 12.9, and  glucose 112. TSH 1.28. Troponin not elevated. Liver function tests  not elevated. Sodium is 133, little low. Chloride 96, little low. BUN and creatinine 6 and 0.7. D-dimer is 619 (CTA done, no clot). A  chest x-ray showed accentuation of the bronchovascular markings, best  seen in the lateral view. I agree with that after review of the  films. DIAGNOSTIC IMPRESSION:  Bilateral pneumonia, aspiration suspected;  epiglottic carcinoma, local metastasis; metastasis with lymph nodes in  neck; smoker; status post 2 cycles of chemoradiation; malnutrition;  factor VIII deficiency; past history of bleeding; COPD from smoking  and cancer from smoking. PLAN:  At this time of point, we will place this patient in a stepdown  unit, get Pulmonary involved, use antibiotics. I believe he is  already given Zosyn and vancomycin. I do not have a problem with that  at least to start. We can narrow down the spectrum as we see fit. No  obvious cardiac issues. Nutritional services to see to try to get  this gilbert a little better fed. We will let his oncology doctor know  that he is here. This poor man with metastatic cancer and smoker who  is both acutely and chronically ill.         Tito Grant,

## 2017-10-04 NOTE — PROGRESS NOTES
rhonchi  Cardiovascular: Regular rate and rhythm with normal S1/S2 without murmurs, rubs or gallops. Abdomen: Soft, non-tender, non-distended with normal bowel sounds. Musculoskeletal: has clubbing, no cyanosis or edema bilaterally. Full range of motion without deformity. Skin: Skin color, texture, turgor normal.  No rashes or lesions. Neurologic:  Neurovascularly intact without any focal sensory/motor deficits. Cranial nerves: II-XII intact, grossly non-focal.  Psychiatric: Alert and oriented, thought content appropriate, normal insight        Labs:   Recent Labs      10/03/17   1130  10/04/17   0549   WBC  5.2  4.4*   HGB  12.9*  11.2*   HCT  38.2*  32.8*   PLT  271  196     Recent Labs      10/03/17   1130  10/04/17   0549   NA  133*  136   K  3.8  3.9   CL  96*  100   CO2  22*  24   BUN  6*  6*   CREATININE  0.7  0.6   CALCIUM  9.2  9.1     Recent Labs      10/03/17   1130   AST  21   ALT  13   BILITOT  0.2*   ALKPHOS  80     Recent Labs      10/03/17   2012   INR  0.99     No results for input(s): Adeliaa Vick in the last 72 hours. Urinalysis:    No results found for: Daphne Malady, BACTERIA, RBCUA, BLOODU, [address]    Radiology:  CTA Chest W WO Contrast   Final Result   1. No CT evidence of pulmonary embolus. 2. There are retained mucous secretions and/or aspirated contents present within the bilateral lower lobe bronchi. The lower lobe bronchi are nearly entirely opacified. 3. Patchy groundglass and centrilobular nodular densities are demonstrated within the lower lobes bilaterally. This may represent sequela from aspiration pneumonia and/or sequela from an atypical mycobacterial infectious pneumonia. Recommend continued    follow-up to document resolution. **This report has been created using voice recognition software. It may contain minor errors which are inherent in voice recognition technology. **          Final report electronically signed by Dr. Suzie Burt on 10/3/2017 1:12 PM      XR CHEST STANDARD (2 VW)   Final Result   Mild accentuation of the bronchovascular markings, best demonstrated on the lateral view. Possible very tiny left pleural effusion posteriorly. **This report has been created using voice recognition software. It may contain minor errors which are inherent in voice recognition technology. **      Final report electronically signed by Dr. Lavell Baron on 10/3/2017 12:02 PM          Diet: Diet NPO Time Specified Exceptions are: Sips with Meds    DVT prophylaxis: [] Lovenox                                 [x] SCDs                                 [] SQ Heparin                                 [] Encourage ambulation           [] Already on Anticoagulation     Disposition:    [] Home       [] TCU       [] Rehab       [] Psych       [] SNF       [] [place]       [x] Other-  Not clear yet, but most likely home    Code Status: Full Code    PT/OT Eval Status: pending    Assessment/Plan:    Anticipated Discharge in : unclear    Active Hospital Problems    Diagnosis Date Noted    Acute hypoxemic respiratory failure (HCC) [J96.01] 10/04/2017    SOB (shortness of breath) [R06.02] 10/03/2017    Pneumonia of both lungs due to group B Streptococcus ([facility] [J15.3] 10/03/2017    Head and neck cancer ([facility] [C76.0] 07/26/2017    Factor VIII deficiency ([facility] Ina Lopez 12/13/2015    Cigarette nicotine dependence with nicotine-induced disorder [F17.219]     Bipolar affective ([facility] [F31.9] 03/14/2012    COPD (chronic obstructive pulmonary disease) ([facility] [J44.9]     GERD (gastroesophageal reflux disease) [K21.9]     Bipolar disorder ([facility] [F31.9]      PLAN:    1. Continue IV antibiotics and deescalate according to culture results  2. Bronchoscopy in the afternoon  3. OK to have radiation per radiation oncologist  4. Dr. Jeanine Gusman consulted  5. Respiratory therapy is changing the trach tube, which has been in place since July  6. Not on oxygen at home. Admitted with acute hypoxemic respiratory failure caused by pneumonia. His oxygen need is high at this time. May need to go home on oxygen  7. D/w patient.  Questions answered      Electronically signed by Kacey Lee MD on 10/4/2017 at 9:55 AM

## 2017-10-04 NOTE — PROGRESS NOTES
Patients transported out to have his bronchoscopy procedure done and report was given to his primary nurse Luz Perez Electronically signed by Jorge Zheng on 10/4/2017 at 2:33 PM

## 2017-10-04 NOTE — PROGRESS NOTES
Received patient report from night shift nurse Cee Granados Electronically signed by Chapo Valdez on 10/4/2017 at 12:15 PM

## 2017-10-04 NOTE — PROGRESS NOTES
This nurse places call to Dr. Lloyd Mcallister regarding consult for oncology. Dr. Lloyd Mcallister updated regarding reason for admission. Dr. Lloyd Mcallister states \"we will hold off on the chemo until his infection is cleared up. I will be up to see him later. \" Dr. Lloyd Mcallister also requests that consult be placed to radiation oncology to inquire regarding radiation therapy. Dr. Lloyd Mcallister states \"I think he may be able to receive the radiation treatment, but check with Dr. Uday Slaughter. \" Dr. Marcelino Cruz office called regarding consult for radiation oncology. This nurse spoke with Celia Hayden RN regarding consult and updated regarding conversation with Dr. Lloyd Mcallister and scheduled bronchoscopy this afternoon. Celia Hayden RN informed this nurse that Dr. Russell Leap with radiation this AM.\" Informed that respiratory therapist are changing tracheostomy at bedside and that patient will be ready to be transported afterwards.

## 2017-10-04 NOTE — PROGRESS NOTES
Nutrition Assessment    Type and Reason for Visit: Initial, Positive Nutrition Screen (weight loss, bedsore, home TF)    Nutrition Recommendations: Recommend change TF regimen d/t intolerance of boluses.  Suggest 2 yaya HN at 75 ml/hr from 7p-9a.  Recommend 200 ml free water flush every 4 hours. Recommend diet vs NPO status per SLP evaluation. Malnutrition Assessment:  · Malnutrition Status: Meets the criteria for severe malnutrition  · Context: Chronic illness  · Findings of the clinical characteristics of malnutrition (Minimum of 2 out of 6 clinical characteristics is required to make the diagnosis of moderate or severe Protein Calorie Malnutrition based on AND/ASPEN Guidelines):  1. Energy Intake-Less than or equal to 75%, greater than or equal to 1 month    2. Weight Loss-20% loss or greater (32%), in 6 months  3. Fat Loss-Severe subcutaneous fat loss, Orbital    Nutrition Diagnosis:   · Problem: Severe malnutrition  · Etiology: related to Catabolic illness, Insufficient energy/nutrient consumption     Signs and symptoms:  as evidenced by Diet history of poor intake, Weight loss, Severe loss of subcutaneous fat    Nutrition Assessment:  · Subjective Assessment: Spoke with pt. and SO.  Pt. reports being unable to tolerate boluses. C/o nausea at times. Reports tolerating nocturnal TF well. SO reports pt. has been refusing boluses. Pt. admits that he eats by mouth although states \"I am not supposed to\". Per RN - order for SLP evaluation. Pt. and SO are agreealbe to increasing nocturnal TF to better meet estimated needs. Left MD sticky note with recommendations.   · Wound Type:  (reported wound; not documented)  · Current Nutrition Therapies:  · Oral Diet Orders: NPO   · Tube Feeding (TF) Orders:   · Feeding Route: Gastrostomy  · Formula: Fluid Restricted (2 yaya HN)  · Rate (ml/hr):50 ml/hr x 12 hours (nocturnal), 240 ml bolus BID    · Volume (ml/day): 1080 ml  · Duration: Cyclic,

## 2017-10-04 NOTE — PROCEDURES
Abdominal: Soft. Patient exhibits no distension. No tenderness. Complications: none     Paul Baldwin MD, [facility]  10/4/2017, 3:52 PM    Bronchoscopy Procedure Note    Patient: Seun Sanders  : 1970        Surgeon: Teresa Norris    Operation: Flexible diagnostic fiberoptic bronchoscopy without fluoroscopy. During procedure Bronch washings obtained  from bilateral tracheobronchial tree. Date of Operation: 10/4/17    Indication for procedure: Abnormal CT chest suggestive of ? Mucus plug Vs endobronchial lesion. Right lower lobe pneumonia. Consent: Seun Sanders is a 40 y.o. male . The risks, benefits, complications, treatment options and expected outcomes were discussed with the patient. Consent obtained from the patient after explaining the risks and complications of the procedure. The possibilities of reaction to medication, pulmonary aspiration, perforation of a viscus, bleeding, failure to diagnose a condition and creating a complication requiring transfusion or operation were discussed with the patient who freely signed the consent. Anesthesia: Patient was given conscious sedation with Versed 2mg ( 1+1) and Fentanyl 75mcg( 50 + 25)      Description of Procedure: The patient was taken to endoscopy suite, identified as Seun Sanders and the procedure verified as Flexible Fiberoptic Bronchoscopy. A Time Out was held and the above information confirmed. The patient was placed in appropriate position and the Flexible Fibrooptic bronchoscope was passed through the #6DCFN tracheostomy after removing the inner cannula. 1% Lidocaine was topically placed into the trachea. Lidocaine 1% was used topically on the sola. The sola is sharp with no growths. Careful inspection of the tracheal lumen was accomplished with no growths.     The scope was  advanced into the right main bronchus and then into the Right upper lobe, Right middle lobe, and Right lower lobe bronchi and segmental bronchi. Thick creamy yellow secretions were noted. The secretions were removed by suctioning. The scope was sequentially passed into the left main and then left upper and lower bronchi and segmental bronchi. Bronchial washings were done form right and left tracheobronchial trees. More than 25 ml of Bronchial washings were obtained and sent to lab for further analysis. Endobronchial findings:   Trachea: Normal mucosa. Laila: Normal mucosa  Right main bronchus: Normal mucosa  Right upper lobe bronchus: Normal mucosa  Right Middle lobe bronchus: Normal mucosa  Right Lower lobe bronchus:Normal mucosa  Left main bronchus: Normal mucosa  Left upper lobe bronchus: Normal mucosa  Left lower lobe bronchus: Normal mucosa    The Patient was taken to the Endoscopy Recovery area in satisfactory condition. Mucus plugs were removed from right and left tracheobronchial trees. No endobronchial lesions seen. Estimated Blood Loss: Less than 2 ml. Complications: None. Recommendation/Plan:    1. F/U on culturs results  2. F/U on cytology results    Attestation: I performed the procedure.     Washington Santos

## 2017-10-05 LAB — VANCOMYCIN TROUGH: 13.1 UG/ML (ref 5–15)

## 2017-10-05 PROCEDURE — 2580000003 HC RX 258: Performed by: INTERNAL MEDICINE

## 2017-10-05 PROCEDURE — 6360000002 HC RX W HCPCS: Performed by: INTERNAL MEDICINE

## 2017-10-05 PROCEDURE — 99222 1ST HOSP IP/OBS MODERATE 55: CPT | Performed by: INTERNAL MEDICINE

## 2017-10-05 PROCEDURE — 1200000003 HC TELEMETRY R&B

## 2017-10-05 PROCEDURE — 80202 ASSAY OF VANCOMYCIN: CPT

## 2017-10-05 PROCEDURE — 6370000000 HC RX 637 (ALT 250 FOR IP): Performed by: INTERNAL MEDICINE

## 2017-10-05 PROCEDURE — 77386 HC NTSTY MODUL RAD TX DLVR CPLX: CPT

## 2017-10-05 PROCEDURE — 99232 SBSQ HOSP IP/OBS MODERATE 35: CPT | Performed by: INTERNAL MEDICINE

## 2017-10-05 PROCEDURE — 36415 COLL VENOUS BLD VENIPUNCTURE: CPT

## 2017-10-05 PROCEDURE — 2700000000 HC OXYGEN THERAPY PER DAY

## 2017-10-05 RX ORDER — LORAZEPAM 0.5 MG/1
0.5 TABLET ORAL EVERY 4 HOURS PRN
Status: DISCONTINUED | OUTPATIENT
Start: 2017-10-05 | End: 2017-10-09 | Stop reason: HOSPADM

## 2017-10-05 RX ADMIN — PIPERACILLIN SODIUM,TAZOBACTAM SODIUM 3.38 G: 3; .375 INJECTION, POWDER, FOR SOLUTION INTRAVENOUS at 04:42

## 2017-10-05 RX ADMIN — VANCOMYCIN HYDROCHLORIDE 1000 MG: 1 INJECTION, POWDER, LYOPHILIZED, FOR SOLUTION INTRAVENOUS at 08:54

## 2017-10-05 RX ADMIN — PIPERACILLIN SODIUM,TAZOBACTAM SODIUM 3.38 G: 3; .375 INJECTION, POWDER, FOR SOLUTION INTRAVENOUS at 23:22

## 2017-10-05 RX ADMIN — TRAZODONE HYDROCHLORIDE 50 MG: 50 TABLET ORAL at 00:08

## 2017-10-05 RX ADMIN — DOCUSATE SODIUM 100 MG: 50 LIQUID ORAL at 08:54

## 2017-10-05 RX ADMIN — LORAZEPAM 0.5 MG: 0.5 TABLET ORAL at 14:11

## 2017-10-05 RX ADMIN — Medication 10 ML: at 21:52

## 2017-10-05 RX ADMIN — LANSOPRAZOLE 30 MG: 30 TABLET, ORALLY DISINTEGRATING, DELAYED RELEASE ORAL at 08:53

## 2017-10-05 RX ADMIN — PIPERACILLIN SODIUM,TAZOBACTAM SODIUM 3.38 G: 3; .375 INJECTION, POWDER, FOR SOLUTION INTRAVENOUS at 13:58

## 2017-10-05 RX ADMIN — VANCOMYCIN HYDROCHLORIDE 1000 MG: 1 INJECTION, POWDER, LYOPHILIZED, FOR SOLUTION INTRAVENOUS at 21:51

## 2017-10-05 ASSESSMENT — PAIN DESCRIPTION - LOCATION
LOCATION: MOUTH

## 2017-10-05 ASSESSMENT — PAIN DESCRIPTION - ONSET
ONSET: ON-GOING

## 2017-10-05 ASSESSMENT — PAIN DESCRIPTION - PROGRESSION
CLINICAL_PROGRESSION: NOT CHANGED

## 2017-10-05 ASSESSMENT — PAIN DESCRIPTION - ORIENTATION
ORIENTATION: LEFT;RIGHT

## 2017-10-05 ASSESSMENT — PAIN SCALES - GENERAL
PAINLEVEL_OUTOF10: 2
PAINLEVEL_OUTOF10: 2
PAINLEVEL_OUTOF10: 0
PAINLEVEL_OUTOF10: 2
PAINLEVEL_OUTOF10: 2
PAINLEVEL_OUTOF10: 0
PAINLEVEL_OUTOF10: 2

## 2017-10-05 ASSESSMENT — PAIN DESCRIPTION - DESCRIPTORS
DESCRIPTORS: ACHING

## 2017-10-05 ASSESSMENT — PAIN DESCRIPTION - FREQUENCY
FREQUENCY: CONTINUOUS

## 2017-10-05 ASSESSMENT — PAIN DESCRIPTION - PAIN TYPE
TYPE: ACUTE PAIN

## 2017-10-05 NOTE — PLAN OF CARE
Problem: Risk for Impaired Skin Integrity  Goal: Tissue integrity - skin and mucous membranes  Structural intactness and normal physiological function of skin and  mucous membranes. Outcome: Ongoing    Problem: Pain:  Goal: Pain level will decrease  Pain level will decrease   Outcome: Ongoing  Pain goal is a 3/10. Fentanyl sublingual given for chronic pain q 6 hours. Problem: Respiratory  Goal: No pulmonary complications  Outcome: Ongoing  Trach in place. Trach mask worn prn. Oxygen saturation WNL. Problem: Nutrition  Goal: Optimal nutrition therapy  Outcome: Ongoing  Patient reports he is \"very hungry. \" General diet ordered, PEG tube used for most nutrition. Problem: Discharge Planning:  Goal: Patients continuum of care needs are met  Patients continuum of care needs are met   Outcome: Ongoing  Plans to return home with wife. Possible home health. Comments:   Care plan reviewed with patient and family. Patient and family verbalize understanding of the plan of care and contribute to goal setting.

## 2017-10-05 NOTE — PROGRESS NOTES
Patient off unit multiple times this shift while going for a \"walk\". Patient informed by this nurse that leaving floor while on telemetry monitor and with current medical status is against medical advice. Patient verbalizes understanding.

## 2017-10-05 NOTE — PLAN OF CARE
Problem: Risk for Impaired Skin Integrity  Goal: Tissue integrity - skin and mucous membranes  Structural intactness and normal physiological function of skin and  mucous membranes. Outcome: Ongoing  No new skin breakdown noted this shift. Problem: Pain:  Goal: Pain level will decrease  Pain level will decrease   Outcome: Ongoing  Continuing to assess for increases in pain level with hourly rounding. Patient c/o pain in \"gums\" related to prior teeth extraction. Patient continues to accept SL Fentanyl and accepts Fentanyl patch, as ordered. Problem: Respiratory  Goal: No pulmonary complications  Outcome: Ongoing  Patient continues to have rhonchi in bilateral lung fields. Patient requesting to suction tracheostomy, per self. Education provided regarding proper technique for tracheostomy suctioning. Goal: O2 Sat > 90%  Outcome: Ongoing  SpO2 remains >90% this shift. Problem: Discharge Planning:  Goal: Patients continuum of care needs are met  Patients continuum of care needs are met   Outcome: Ongoing  Discharge plan reviewed with patient. Patient continues to actively participate in current discharge plan. Problem: Falls - Risk of  Goal: Absence of falls  Outcome: Ongoing  Fall assessment completed. Patient utilizes call light appropriately for needs. Patient accepts non-skid socks as fall preventative. Fall signs posted. Good safety awareness noted by patient. Problem: Airway Clearance - Ineffective:  Goal: Clear lung sounds  Clear lung sounds  Outcome: Ongoing  Patient continues to have rhonchi in bilateral lung fields. Continues on IV ATB therapy for bilateral pneumonia. Goal: Ability to maintain a clear airway will improve  Ability to maintain a clear airway will improve  Outcome: Ongoing  Patient requesting to suction tracheostomy, per self. Education provided regarding proper technique for tracheostomy suctioning. Patient verbalizes understanding.      Comments:   Plan of care reviewed with patient. Patient verbalizes understanding and participates in goal setting.

## 2017-10-05 NOTE — PROGRESS NOTES
Assisted patient to suction self at this time. Suctioning attempt unsuccessful as patient is hesitant to advance suctioning catheter far enough. Patient refuses to allow myself or instructor to suction him at this time. Will continue to educate patient. Primary Vandana Snyder RN notified at this time.

## 2017-10-05 NOTE — PROGRESS NOTES
Treatment # 21 of 35 given today. Total dose to date 4200 cGy of planned dose 7000 cGy. Patient tolerated treatment fine. No other issues.     Folwer Pope BS, RT, R, T,

## 2017-10-05 NOTE — CONSULTS
[facility]    Patient Geronimo Mir   MRN -  [id number]   [facility]   - 1970      Date of Admission -  10/3/2017 11:11 AM  Date of evaluation -  10/5/2017  Room - [address] [place] [address] Street, MD Primary Care Physician - No primary care provider on file. Reason for Consult      1. Aspiration pneumonia  2. Head and neck cancer undergoing concurrent chemoradiation. [address] Problems    Diagnosis Date Noted    Acute hypoxemic respiratory failure (HCC) [J96.01] 10/04/2017    Severe malnutrition (HCC) [E43] 10/04/2017    Abnormal CT scan, chest [R93.8]     SOB (shortness of breath) [R06.02] 10/03/2017    Pneumonia of both lungs due to group B Streptococcus (HCC) [J15.3] 10/03/2017    Head and neck cancer (HCC) [C76.0] 2017    Factor VIII deficiency ([facility] Moose Hong 2015    Cigarette nicotine dependence with nicotine-induced disorder [F17.219]     Bipolar affective ([address] [F31.9] 2012    COPD (chronic obstructive pulmonary disease) ([facility] [J44.9]     GERD (gastroesophageal reflux disease) [K21.9]     Bipolar disorder ([facility] [F31.9]      MARTHA Mir is a 40 y.o. male who presented to ED with tachycardia, SOB and anxiety on 10/03/2017. The patient is undergoing concurrent chemoradiation for stage IV  epiglottic cancer. The patient came to chemotherapy suite on  was tachycardic and SOB. He was instructed to go to ED, however he did not comply with order. He is receiving Cisplatin every 3 weeks. The second dose was given on . Oncology History:  His history dates back to  May 2017 when he presented to [facility] with neck mass and pain. CT of the neck performed on May 11, 2017 showed multiple enlarged and necrotic appearing lymph nodes in the left neck.  Subsequently the patient had fiberoptic laryngoscopy that showed tumor diarrhea, nausea, rectal pain and vomiting. Endocrine: Negative for cold intolerance, polydipsia and polyuria. Genitourinary: Negative for decreased urine volume, difficulty urinating, dysuria, flank pain, hematuria and urgency. Musculoskeletal: Negative for arthralgias, back pain, gait problem, joint swelling, myalgias and neck stiffness. Skin: Negative for color change, rash and wound. Neurological: Negative for dizziness, tremors, seizures, speech difficulty, weakness, light-headedness, numbness and headaches. Hematological: Negative for adenopathy. Does not bruise/bleed easily. Psychiatric/Behavioral: Positive for behavioral problems and dysphoric mood. Negative for confusion and sleep disturbance. The patient is not nervous/anxious. Vitals     height is 6' (1.829 m) and weight is 120 lb (54.4 kg). His oral temperature is 98.4 °F (36.9 °C). His blood pressure is 96/74 and his pulse is 84. His respiration is 16 and oxygen saturation is 98%. O2 Flow Rate (L/min): 15 L/min    Exam   Physical Exam   Constitutional: He is oriented to person, place, and time. He appears well-developed and well-nourished. No distress. HENT:   Head: Normocephalic. Mouth/Throat: Oropharynx is clear and moist. No oropharyngeal exudate. Eyes: EOM are normal. Pupils are equal, round, and reactive to light. Right eye exhibits no discharge. Left eye exhibits no discharge. No scleral icterus. Neck: Normal range of motion. Neck supple. No JVD present. No tracheal deviation present. No thyromegaly present. Cardiovascular: Normal rate and normal heart sounds. Exam reveals no gallop and no friction rub. No murmur heard. Pulmonary/Chest: Effort normal and breath sounds normal. No stridor. No respiratory distress. He has no wheezes. He has no rales. He exhibits no tenderness. Abdominal: Soft. Bowel sounds are normal. He exhibits no distension and no mass. There is no tenderness. There is no rebound. Musculoskeletal: Normal range of motion. He exhibits no edema. Good range of motion in all four extremities. Lymphadenopathy:     He has no cervical adenopathy. Neurological: He is alert and oriented to person, place, and time. He has normal reflexes. No cranial nerve deficit. He exhibits normal muscle tone. Skin: Skin is warm. No rash noted. No erythema. Psychiatric: He has a normal mood and affect. His behavior is normal. Judgment and thought content normal.   Vitals reviewed. Labs   CBC  Recent Labs      10/03/17   1130  10/04/17   0549   WBC  5.2  4.4*   RBC  3.93*  3.34*   HGB  12.9*  11.2*   HCT  38.2*  32.8*   MCV  97.0*  98.3*   MCH  32.7*  33.7*   MCHC  33.7  34.3   RDW  17.1*  15.8*   PLT  271  196   MPV  7.9  8.4      BMP  Recent Labs      10/03/17   1130  10/04/17   0549   NA  133*  136   K  3.8  3.9   CL  96*  100   CO2  22*  24   BUN  6*  6*   CREATININE  0.7  0.6   GLUCOSE  112*  71   CALCIUM  9.2  9.1     LFT  Recent Labs      10/03/17   1130   AST  21   ALT  13   BILITOT  0.2*   ALKPHOS  80     INR  Recent Labs      10/03/17   2012   INR  0.99     PTT  No results for input(s): APTT in the last 72 hours. Radiology        Xr Chest Standard (2 Vw)    Result Date: 10/3/2017  PROCEDURE: XR CHEST STANDARD TWO VW CLINICAL INFORMATION: shortness of breath, tachycardia and cough,  decreased O2 sats COMPARISON: 7/16/2017 TECHNIQUE: AP and lateral views FINDINGS: The heart is not enlarged. The mediastinum is not widened. The tracheostomy tube appears stable. There is mild accentuation of the bronchovascular markings, best demonstrated on the lateral view. There is no focal consolidation. There is very minimal blunting of the posterior left costophrenic angle and a tiny left pleural effusion cannot be excluded. The osseous structures appear stable. Mild accentuation of the bronchovascular markings, best demonstrated on the lateral view.  Possible very tiny left pleural effusion posteriorly. **This report has been created using voice recognition software. It may contain minor errors which are inherent in voice recognition technology. ** Final report electronically signed by Dr. Luisa Wagner on 10/3/2017 12:02 PM    Cta Chest W Wo Contrast    Result Date: 10/3/2017  PROCEDURE: CTA CHEST W WO CONTRAST CLINICAL INFORMATION: shortness of breath, tachycardia, elevated d-dimer COMPARISON: 12/12/2015 TECHNIQUE: 1.5 mm axial images were obtained through the chest after the administration of IV contrast.  A non-contrast localizer was obtained. 3D reconstructions were performed on the scanner to include coronal and sagittal reformatted images through both the right and left pulmonary arteries. All CT scans at this facility use dose modulation, iterative reconstruction, and/or weight-based dosing when appropriate to reduce radiation dose to as low as reasonably achievable. FINDINGS: Evaluation of the pulmonary arteries demonstrates no evidence of pulmonary embolism. There is an tracheostomy tube present within the trachea with the tip located approximate 5 cm above the sola. There is fluid and/or debris demonstrated within the bilateral lower lobe bronchi. This may represent aspirated contents. Patchy groundglass and nodular densities are seen within the bilateral lower lobes which may represent sequela from atypical mycobacterial infectious process versus aspiration pneumonia. No pleural effusion or pneumothorax is seen. No mediastinal lymphadenopathy is seen. No hilar or axillary lymphadenopathy is seen. Limited evaluation of the upper abdomen demonstrates no gross evidence of acute abnormality. [place] Selena no acute osseous findings are seen. There is bilateral gynecomastia noted. 1. No CT evidence of pulmonary embolus. 2. There are retained mucous secretions and/or aspirated contents present within the bilateral lower lobe bronchi. The lower lobe bronchi are nearly entirely opacified.  3. Patchy

## 2017-10-05 NOTE — PROGRESS NOTES
Patient reassessed at this time. No changes from morning assessment. Full assessment found in flowsheets.

## 2017-10-05 NOTE — PROGRESS NOTES
Physical Therapy  [facility]  PHYSICAL THERAPY MISSED TREATMENT NOTE  ACUTE CARE    Date: 10/5/2017  Patient Name: Yoel Matthews        MRN: [id number]   : 1970  (40 y.o.)  Gender: male   Referring Practitioner: Dr. Orly Moore MD  Referring Practitioner: Dr Kell Tabares  Diagnosis: Pneumonia  Diagnosis: Pneumonia         REASON FOR MISSED TREATMENT:  Missed treat due to pt refusal. Pt resting in bed with wife present. Pt shakes head no in refusal to therapy, wife states pt has been up walking a lot this morning and just now received pain meds and would like to rest at this time. Will try back later as time allows.

## 2017-10-05 NOTE — PROGRESS NOTES
[facility]  INPATIENT OCCUPATIONAL THERAPY  [facility] ONC MED 5K  EVALUATION    Time:  Time In:   Time Out: 0848  Timed Code Treatment Minutes: 0 Minutes  Minutes: 9          Date: 10/5/2017  Patient Name: Robles Hogan,   Gender: male      MRN: [id number]  : 1970  (40 y.o.)  Referring Practitioner: Dr. Whit Conway MD  Diagnosis: Pneumonia  Diagnosis: The encounter diagnosis was Chronic obstructive pulmonary disease, unspecified COPD type ([facility] Additional Pertinent Hx: Admitted with above dx. 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 He was referred to [facility] ENT. He underwent tracheostomy, esophagoscopy at [facility] along with PEG placement. The biopsy of Epiglottic mass and left base of tongue showed invasive squamous cell carcinoma. Restrictions/Precautions:  Restrictions/Precautions: Fall Risk, General Precautions       Position Activity Restriction  Other position/activity restrictions: trach, peg         Robles Hogan  has a past medical history of [facility] disorder ([facility] Cancer [facility]); COPD (chronic obstructive pulmonary disease) ([facility] Disease of blood and blood forming organ; GERD (gastroesophageal reflux disease); History of blood transfusion; and Pneumonia of both lungs due to group B Streptococcus ([facility] Robles Hogan  has a past surgical history that includes Appendectomy; Tonsillectomy and adenoidectomy; tracheostomy; Gastrostomy tube placement; and bronchoscopy (N/A, 10/4/2017).         Subjective  Chart Reviewed: Yes  Patient assessed for rehabilitation services?: Yes    Subjective: required encouragement    General:       Vision: Within Functional Limits    Hearing: Within functional limits         Pain:  Pain Assessment  Patient Currently in Pain: No (n c/o pain during session)       Social/Functional:  Lives With: Spouse  Type of Home: House  Home Layout: Two level, Able to Live on Main level with bedroom/bathroom (tub  is No  Other: Continue to assess    Safety:  Safety Devices in place: Yes  Type of devices: All fall risk precautions in place, Call light within reach, Nurse notified    Plan:  Times per week: 5x  Current Treatment Recommendations: Balance Training, Functional Mobility Training, Self-Care / ADL, Safety Education & Training, Endurance Training    Goals:  Patient goals : Pt did not state    Short term goals  Time Frame for Short term goals: 2 weeks  Short term goal 1: Complete BUE AROM/light strengthening exercises x 10 reps with min RBs to increase endurance for BADL  Short term goal 2: Tolerate further assessment of functional mobility by OTR  Long term goals  Time Frame for Long term goals : No LTG set d/t short ELOS     Evaluation Complexity: Based on the findings of patient history, examination, clinical presentation, and decision making during this evaluation, this patient is of medium complexity.

## 2017-10-05 NOTE — PROGRESS NOTES
Oropharynx is clear and moist.     Eyes: Conjunctivae are normal.    Neck: trach noted   Cardiovascular: Regular rate, regular rhythm, S1 and S2 with no murmur. No peripheral edema  Pulmonary/Chest: Normal effort with clear breath sounds anterior aspect along with lateral aspect. No stridor. No respiratory distress. Abdominal: Soft. Bowel sounds audible. No distension or tenderness to palp  Musculoskeletal: Moves all extremities  Lymphadenopathy:  No cervical adenopathy. Neurological: Patient is alert and oriented to person, place, and time. Skin: Skin is warm and dry.     Meds       docusate  100 mg Oral TID    fentaNYL  1 patch Transdermal Q72H    nystatin  500,000 Units Mouth/Throat 4x Daily    traZODone  50 mg Oral Nightly    sodium chloride flush  10 mL Intravenous 2 times per day    piperacillin-tazobactam  3.375 g Intravenous Q8H    lansoprazole  30 mg Oral QAM AC    vancomycin (VANCOCIN) intermittent dosing (placeholder)   Other RX Placeholder    vancomycin  1,000 mg Intravenous Q12H      sodium chloride 60 mL/hr at 10/04/17 1758     LORazepam, Fentanyl Spray 800 mcg/actuation, albuterol sulfate HFA, ondansetron, promethazine, sennosides, sodium chloride flush, acetaminophen, magnesium hydroxide  Labs   ABG  No results found for: PH, PO2, PCO2, HCO3, O2SAT  No results found for: IFIO2, MODE, SETTIDVOL, SETPEEP  CBC  Recent Labs      10/03/17   1130  10/04/17   0549   WBC  5.2  4.4*   RBC  3.93*  3.34*   HGB  12.9*  11.2*   HCT  38.2*  32.8*   MCV  97.0*  98.3*   MCH  32.7*  33.7*   MCHC  33.7  34.3   RDW  17.1*  15.8*   PLT  271  196   MPV  7.9  8.4      BMP  Recent Labs      10/03/17   1130  10/04/17   0549   NA  133*  136   K  3.8  3.9   CL  96*  100   CO2  22*  24   BUN  6*  6*   CREATININE  0.7  0.6   GLUCOSE  112*  71   CALCIUM  9.2  9.1     LFT  Recent Labs      10/03/17   1130   AST  21   ALT  13   BILITOT  0.2*   ALKPHOS  80     TROP  Lab Results   Component Value Date    TROPONINT < from an atypical mycobacterial infectious pneumonia. Recommend continued follow-up to document resolution.    Active Hospital Problem List      Active Hospital Problems    Diagnosis Date Noted    Acute hypoxemic respiratory failure (HCC) [J96.01] 10/04/2017    Severe malnutrition (HCC) [E43] 10/04/2017    Abnormal CT scan, chest [R93.8]     SOB (shortness of breath) [R06.02] 10/03/2017    Pneumonia of both lungs due to group B Streptococcus (HCC) [J15.3] 10/03/2017    Head and neck cancer (HCC) [C76.0] 07/26/2017    Factor VIII deficiency ([facility] Ina Lopez 12/13/2015    Cigarette nicotine dependence with nicotine-induced disorder [F17.219]     Bipolar affective ([facility] [F31.9] 03/14/2012    COPD (chronic obstructive pulmonary disease) ([facility] [J44.9]     GERD (gastroesophageal reflux disease) [K21.9]     Bipolar disorder (HCC) [F31.9]      Assessment and Plan   Bilateral pneumonia due to CAP Vs aspiration Vs HCAP on antibiotics  Abnormal CT chest with obstruction of air ways- most likely due to mucus plugs Vs endobronchial lesions less likely. Chronic hx of tobacco smoking. He is still smoking 0.5PPD. 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 Factor VIII deficiency  Chronic Respiratory Failure requiring trach with 02 via trach mask with trach changed by RT on 10/4/2017 with #6 [facility]    -ENT consult ordered for trach change~changed by RT on 10/4/2017 with #6 Marta DCFN    -Zosyn 10/3/Vanc 10/3~plan Augmentin on discharge  -await bronch washings  -smoking cessation    Case discussed with nurse and patient/family. Questions and concerns addressed. Meds and Orders reviewed. Electronically signed by     Ivelisse Cristina CNP on 10/5/2017 at 2:11 PM    Addendum by Dr. Norris Murillo MD:  I have seen and examined the patient independently. Face to face evaluation and examination was performed.    The above evaluation and note has been

## 2017-10-05 NOTE — PROGRESS NOTES
[facility] THERAPY MISSED TREATMENT NOTE  [facility] ONC MED 5K      Date: 10/5/2017  Patient Name: Lavell Felix        MRN: [id number]    : 1970  (40 y.o.)    REASON FOR MISSED TREATMENT:  Attempted to see patient for swallow evaluation. Patient currently unavailable d/t working with OT Kayla Colin. Will check back as schedule permits or complete swallow evaluation 10/06.     JAYCEE Escobedo 23

## 2017-10-05 NOTE — PROGRESS NOTES
Respiratory Care Artificial Airway Evaluation    The patients airway is older than seven days. The patients airway does not have panic sutures. Patient experiencing no problems.     Type of airway:  [x] Tracheostomy  [] Larngectomy    Size 6.0    Type  []Shiley disposable cannula cuffed tracheostomy tube (DCT)   []Shiley diposable cannula fenestrated cuffed tracheostomy tube (DFEN)  []Shiley disposable cannula cuffed percutaneous tracheostomy tube (PERC)  []Shiley disposable cannula cuffless tracheostomy tube (DCFS)  [x] Shiley disposable cannula cuffless fenestrated tracheostomy tube [facility])  []Shiley extended length cuffless tracheostomy tube  []Shiley extended length cuffed tracheostomy tube  []Shiley laryngectomy tube (LGT)  []Shiley single cannula cuffed tracheostomy tube (SCT)  []Other:    Length  []standard  []99 mm  []120 mm  []Other:

## 2017-10-05 NOTE — PROGRESS NOTES
This student nurse in to assess patient at this time. Patient alert and oriented to person, place, time, situation. Speech appropriate, incomprehensible at times due to tracheostomy. Mouth pain stated 2 on 0-10 scale. Medicated at this time (see eMAR). Hand grasp strong and equal bilaterally. Skin turgor and capillary refill both less than 3 seconds bilaterally. Patient remains on trach mask at 15L, tracheostomy free of redness, edema, pain. Patient in NSR on telemetry, heart tones regular. Rhonchi heard throughout bilateral lungs. Patient does have a frequent, productive cough, clear sputum. Bowel sounds active in all 4 quadrants. Abdomen soft, flat, non-tender. PEG tube site remains free of redness, edema, pain. Pedal push and pull strong and equal bilaterally. Lower extremities remain free of edema, numbness, tingling. IV infusing in left forearm. Site remains free of redness, edema, pain. IV dressing clean, dry, intact. Patient refusing bed alarm at this time, Primary Mynor Terrazas RN aware. Bedside table and call light in reach.

## 2017-10-05 NOTE — PROGRESS NOTES
Hospitalist Progress Note    Patient:  Iona Zazueta      Unit/Bed:5K-22/022-A    YOB: 1970    MRN: [id number]       Acct: [de-identified]     PCP: No primary care provider on file. Date of Admission: 10/3/2017    Chief Complaint: cough, shortness of breath     Hospital Course: admitted with pneumonia. Seen by pulmonology. Artice Ruts changed, had bronchoscopy. Cultures are pending    Subjective: has shortness of breath, slightly better. No chest pain. Others reviewed in complete detail and found negative x 12       Medications:  Reviewed    Infusion Medications    sodium chloride 60 mL/hr at 10/04/17 1758     Scheduled Medications    docusate  100 mg Oral TID    fentaNYL  1 patch Transdermal Q72H    nystatin  500,000 Units Mouth/Throat 4x Daily    traZODone  50 mg Oral Nightly    sodium chloride flush  10 mL Intravenous 2 times per day    piperacillin-tazobactam  3.375 g Intravenous Q8H    lansoprazole  30 mg Oral QAM AC    vancomycin (VANCOCIN) intermittent dosing (placeholder)   Other RX Placeholder    vancomycin  1,000 mg Intravenous Q12H     PRN Meds: LORazepam, Fentanyl Spray 800 mcg/actuation, albuterol sulfate HFA, ondansetron, promethazine, sennosides, sodium chloride flush, acetaminophen, magnesium hydroxide      Intake/Output Summary (Last 24 hours) at 10/05/17 1801  Last data filed at 10/05/17 1347   Gross per 24 hour   Intake             3511 ml   Output                0 ml   Net             3511 ml       Diet:  DIET GENERAL;    Exam:  BP 96/74  Pulse 84  Temp 98.4 °F (36.9 °C) (Oral)   Resp 16  Ht 6' (1.829 m)  Wt 120 lb (54.4 kg)  SpO2 99%  BMI 16.27 kg/m2    General appearance: No apparent distress, appears older than stated age and cooperative. HEENT: Pupils equal, round, and reactive to light. Conjunctivae / corneas clear. Neck: Supple, with full range of motion. Has tracheostomy, clean  Respiratory:  Normal respiratory effort.  Scattered anterior 1:12 PM      XR CHEST STANDARD (2 VW)   Final Result   Mild accentuation of the bronchovascular markings, best demonstrated on the lateral view. Possible very tiny left pleural effusion posteriorly. **This report has been created using voice recognition software. It may contain minor errors which are inherent in voice recognition technology. **      Final report electronically signed by Dr. Hang Aponte on 10/3/2017 12:02 PM          Diet: DIET GENERAL;    DVT prophylaxis: [] Lovenox                                 [x] SCDs                                 [] SQ Heparin                                 [] Encourage ambulation           [] Already on Anticoagulation     Disposition:    [] Home       [] TCU       [] Rehab       [] Psych       [] SNF       [] [place]       [x] Other-  Not clear yet, but most likely home    Code Status: Full Code    PT/OT Eval Status: pending    Assessment/Plan:    Anticipated Discharge in : unclear    Active Hospital Problems    Diagnosis Date Noted    Acute hypoxemic respiratory failure ([facility] [J96.01] 10/04/2017    Severe malnutrition ([facility] [E43] 10/04/2017    Abnormal CT scan, chest [R93.8]     SOB (shortness of breath) [R06.02] 10/03/2017    Pneumonia of both lungs due to group B Streptococcus (HCC) [J15.3] 10/03/2017    Head and neck cancer (HCC) [C76.0] 07/26/2017    Factor VIII deficiency ([facility] [address] Heather Valle 12/13/2015    Cigarette nicotine dependence with nicotine-induced disorder [F17.219]     Bipolar affective ([facility] [address] [F31.9] 03/14/2012    COPD (chronic obstructive pulmonary disease) ([facility] [address] [J44.9]     GERD (gastroesophageal reflux disease) [K21.9]     Bipolar disorder ([address] [F31.9]      PLAN:    1. Continue IV antibiotics as before. Cultures are currently pending  2. Speech therapy consulted for swallow evaluation  3. May need to go home on oxygen  4. D/w patient and family. Questions answered  5. Smoking cessation recommended  6.  D/w nursing      Electronically signed by Noa Lang MD on 10/5/2017 at 6:01 PM

## 2017-10-06 PROBLEM — C32.9 LARYNGEAL CANCER (HCC): Status: ACTIVE | Noted: 2017-10-06

## 2017-10-06 PROBLEM — R13.13 PHARYNGEAL DYSPHAGIA: Status: ACTIVE | Noted: 2017-10-06

## 2017-10-06 PROBLEM — J15.1 PNEUMONIA OF BOTH LOWER LOBES DUE TO PSEUDOMONAS SPECIES (HCC): Status: ACTIVE | Noted: 2017-10-06

## 2017-10-06 LAB
CREAT SERPL-MCNC: 0.6 MG/DL (ref 0.4–1.2)
GFR SERPL CREATININE-BSD FRML MDRD: > 90 ML/MIN/1.73M2

## 2017-10-06 PROCEDURE — 6360000002 HC RX W HCPCS: Performed by: INTERNAL MEDICINE

## 2017-10-06 PROCEDURE — 2700000000 HC OXYGEN THERAPY PER DAY

## 2017-10-06 PROCEDURE — 6370000000 HC RX 637 (ALT 250 FOR IP): Performed by: INTERNAL MEDICINE

## 2017-10-06 PROCEDURE — 92610 EVALUATE SWALLOWING FUNCTION: CPT

## 2017-10-06 PROCEDURE — 99232 SBSQ HOSP IP/OBS MODERATE 35: CPT | Performed by: INTERNAL MEDICINE

## 2017-10-06 PROCEDURE — 2580000003 HC RX 258: Performed by: INTERNAL MEDICINE

## 2017-10-06 PROCEDURE — 94640 AIRWAY INHALATION TREATMENT: CPT

## 2017-10-06 PROCEDURE — 94760 N-INVAS EAR/PLS OXIMETRY 1: CPT

## 2017-10-06 PROCEDURE — 82565 ASSAY OF CREATININE: CPT

## 2017-10-06 PROCEDURE — 1200000000 HC SEMI PRIVATE

## 2017-10-06 PROCEDURE — 36415 COLL VENOUS BLD VENIPUNCTURE: CPT

## 2017-10-06 PROCEDURE — 77338 DESIGN MLC DEVICE FOR IMRT: CPT | Performed by: RADIOLOGY

## 2017-10-06 PROCEDURE — 77386 HC NTSTY MODUL RAD TX DLVR CPLX: CPT | Performed by: RADIOLOGY

## 2017-10-06 RX ORDER — PANTOPRAZOLE SODIUM 40 MG/10ML
40 INJECTION, POWDER, LYOPHILIZED, FOR SOLUTION INTRAVENOUS DAILY
Status: DISCONTINUED | OUTPATIENT
Start: 2017-10-07 | End: 2017-10-09 | Stop reason: HOSPADM

## 2017-10-06 RX ORDER — TOBRAMYCIN INHALATION SOLUTION 300 MG/5ML
300 INHALANT RESPIRATORY (INHALATION) 2 TIMES DAILY
Status: DISCONTINUED | OUTPATIENT
Start: 2017-10-06 | End: 2017-10-09 | Stop reason: HOSPADM

## 2017-10-06 RX ADMIN — LANSOPRAZOLE 30 MG: 30 TABLET, ORALLY DISINTEGRATING, DELAYED RELEASE ORAL at 14:37

## 2017-10-06 RX ADMIN — Medication 300 MG: at 20:39

## 2017-10-06 RX ADMIN — VANCOMYCIN HYDROCHLORIDE 1000 MG: 1 INJECTION, POWDER, LYOPHILIZED, FOR SOLUTION INTRAVENOUS at 09:38

## 2017-10-06 RX ADMIN — PIPERACILLIN SODIUM,TAZOBACTAM SODIUM 3.38 G: 3; .375 INJECTION, POWDER, FOR SOLUTION INTRAVENOUS at 21:37

## 2017-10-06 RX ADMIN — ONDANSETRON 4 MG: 4 TABLET, ORALLY DISINTEGRATING ORAL at 09:04

## 2017-10-06 RX ADMIN — PIPERACILLIN SODIUM,TAZOBACTAM SODIUM 3.38 G: 3; .375 INJECTION, POWDER, FOR SOLUTION INTRAVENOUS at 14:36

## 2017-10-06 RX ADMIN — SODIUM CHLORIDE: 9 INJECTION, SOLUTION INTRAVENOUS at 09:38

## 2017-10-06 RX ADMIN — LORAZEPAM 0.5 MG: 0.5 TABLET ORAL at 13:10

## 2017-10-06 RX ADMIN — Medication 2 PUFF: at 20:38

## 2017-10-06 RX ADMIN — TRAZODONE HYDROCHLORIDE 50 MG: 50 TABLET ORAL at 00:14

## 2017-10-06 RX ADMIN — PIPERACILLIN SODIUM,TAZOBACTAM SODIUM 3.38 G: 3; .375 INJECTION, POWDER, FOR SOLUTION INTRAVENOUS at 05:22

## 2017-10-06 ASSESSMENT — ENCOUNTER SYMPTOMS
CONSTIPATION: 0
NAUSEA: 0
COUGH: 1
CHOKING: 1
VOMITING: 0
BACK PAIN: 0
COLOR CHANGE: 0
FACIAL SWELLING: 0
BLOOD IN STOOL: 0
ABDOMINAL DISTENTION: 0
ABDOMINAL PAIN: 0
RECTAL PAIN: 0
SHORTNESS OF BREATH: 1
TROUBLE SWALLOWING: 1
WHEEZING: 0
SORE THROAT: 1
VOICE CHANGE: 1
CHEST TIGHTNESS: 0
DIARRHEA: 0
EYE DISCHARGE: 0

## 2017-10-06 ASSESSMENT — PAIN SCALES - GENERAL
PAINLEVEL_OUTOF10: 0
PAINLEVEL_OUTOF10: 0
PAINLEVEL_OUTOF10: 7
PAINLEVEL_OUTOF10: 0

## 2017-10-06 ASSESSMENT — PAIN DESCRIPTION - DESCRIPTORS: DESCRIPTORS: ACHING

## 2017-10-06 ASSESSMENT — PAIN DESCRIPTION - PAIN TYPE: TYPE: ACUTE PAIN

## 2017-10-06 ASSESSMENT — PAIN DESCRIPTION - LOCATION: LOCATION: NECK

## 2017-10-06 NOTE — PROGRESS NOTES
Mouth/Throat: Oropharynx is clear and moist.     Eyes: Conjunctivae are normal.    Neck: trach noted   Cardiovascular: Regular rate, regular rhythm, S1 and S2 with no murmur. No peripheral edema  Pulmonary/Chest: Normal effort with diminished breath sounds. No stridor. No respiratory distress. Abdominal: Soft. Bowel sounds audible. No distension or tenderness to palp  Musculoskeletal: Moves all extremities  Lymphadenopathy:  No cervical adenopathy. Neurological: Patient is alert and oriented to person, place, and time. Skin: Skin is warm and dry.     Meds       docusate  100 mg Oral TID    fentaNYL  1 patch Transdermal Q72H    nystatin  500,000 Units Mouth/Throat 4x Daily    traZODone  50 mg Oral Nightly    sodium chloride flush  10 mL Intravenous 2 times per day    piperacillin-tazobactam  3.375 g Intravenous Q8H    lansoprazole  30 mg Oral QAM AC    vancomycin (VANCOCIN) intermittent dosing (placeholder)   Other RX Placeholder    vancomycin  1,000 mg Intravenous Q12H      sodium chloride 60 mL/hr at 10/06/17 0938     LORazepam, Fentanyl Spray 800 mcg/actuation, albuterol sulfate HFA, ondansetron, promethazine, sennosides, sodium chloride flush, acetaminophen, magnesium hydroxide  Labs   ABG  No results found for: PH, PO2, PCO2, HCO3, O2SAT  No results found for: IFIO2, MODE, SETTIDVOL, SETPEEP  CBC  Recent Labs      10/03/17   1130  10/04/17   0549   WBC  5.2  4.4*   RBC  3.93*  3.34*   HGB  12.9*  11.2*   HCT  38.2*  32.8*   MCV  97.0*  98.3*   MCH  32.7*  33.7*   MCHC  33.7  34.3   RDW  17.1*  15.8*   PLT  271  196   MPV  7.9  8.4      BMP  Recent Labs      10/03/17   1130  10/04/17   0549  10/06/17   0530   NA  133*  136   --    K  3.8  3.9   --    CL  96*  100   --    CO2  22*  24   --    BUN  6*  6*   --    CREATININE  0.7  0.6  0.6   GLUCOSE  112*  71   --    CALCIUM  9.2  9.1   --      LFT  Recent Labs      10/03/17   1130   AST  21   ALT  13   BILITOT  0.2*   ALKPHOS  80     TROP  Lab Results   Component Value Date    TROPONINT < 0.010 10/03/2017    TROPONINT < 0.010 05/11/2017    TROPONINT < 0.010 08/05/2016     BNP  Lab Results   Component Value Date    PROBNP 43.6 05/11/2017     D-Dimer  Lab Results   Component Value Date    DDIMER 619.00 10/03/2017     Lactic Acid  No results for input(s): LACTA in the last 72 hours. INR  Recent Labs      10/03/17   2012   INR  0.99     PTT  No results for input(s): APTT in the last 72 hours. Glucose  No results for input(s): POCGLU in the last 72 hours. UA No results for input(s): SPECGRAV, PHUR, COLORU, CLARITYU, MUCUS, PROTEINU, BLOODU, RBCUA, WBCUA, BACTERIA, NITRU, GLUCOSEU, BILIRUBINUR, UROBILINOGEN, KETUA, LABCAST, LABCASTTY, AMORPHOS in the last 72 hours. Invalid input(s): CRYSTALS. EKG     Echo     Cultures    Procalcitonin   No results found for: PROCAL     Blood cx x 2 (-) prelim. Bronch washings:  Respiratory Culture 10/04/2017  3:42  [facility]      Normal jessy- preliminary     Gram Stain Result 10/04/2017  3:42  [facility]     Moderate segmented neutrophils observed. Few gram positive cocci in pairs and clusters. Few gram positive cocci in chains   Rare gram positive bacilli.            Blood cx x 2 (-) prelim. AFB (-)  Fungus (-)  Pneumocystis organisms (-)  Sputum cx:  Value: Pseudomonas aeruginosa (Abnormal)      Comment:      *Additional information available - narrative           10/6/2017  8:43 AM - John Paul, Judeoh Incoming Lab Results From [facility]       Component Results      Component Collected Lab     Respiratory Culture (Abnormal) 10/04/2017  3:42  [facility]     Normal jessy- preliminary   At least one of drugs in current antibiotic therapy is   ineffective in vitro for isolate.        Gram Stain Result 10/04/2017  3:42  [facility]     Moderate segmented neutrophils observed. Few gram positive cocci in pairs and clusters.    Few gram positive cocci

## 2017-10-06 NOTE — CARE COORDINATION
10/6/17, 1:33 PM    DISCHARGE BARRIERS    Spoke with patient and spouse re: home health services at discharge. Patient is in agreement with this and is also in agreement with using [facility]. He and spouse have both stated that they were told by physician that patient would be here over the weekend.

## 2017-10-06 NOTE — PROGRESS NOTES
Hospitalist Progress Note    Patient:  Teddy Lacey      Unit/Bed:5K-22/022-A    YOB: 1970    MRN: [id number]       Acct: [de-identified]     PCP: No primary care provider on file. Date of Admission: 10/3/2017    Chief Complaint: cough, shortness of breath     Hospital Course: bronchial cultures grow pseudomonas. Patient made npo due to failing swallow eval. Had failed before, but insisted on eating regular food. Subjective: Shortness of breath has slightly improved. No chest pain. Others reviewed in complete detail and found negative x 12       Medications:  Reviewed    Infusion Medications    sodium chloride 60 mL/hr at 10/06/17 0938     Scheduled Medications    [START ON 10/7/2017] pantoprazole  40 mg Intravenous Daily    docusate  100 mg Oral TID    fentaNYL  1 patch Transdermal Q72H    nystatin  500,000 Units Mouth/Throat 4x Daily    traZODone  50 mg Oral Nightly    sodium chloride flush  10 mL Intravenous 2 times per day    piperacillin-tazobactam  3.375 g Intravenous Q8H    vancomycin (VANCOCIN) intermittent dosing (placeholder)   Other RX Placeholder    vancomycin  1,000 mg Intravenous Q12H     PRN Meds: LORazepam, Fentanyl Spray 800 mcg/actuation, albuterol sulfate HFA, ondansetron, promethazine, sennosides, sodium chloride flush, acetaminophen, magnesium hydroxide      Intake/Output Summary (Last 24 hours) at 10/06/17 1514  Last data filed at 10/06/17 1316   Gross per 24 hour   Intake             1045 ml   Output                0 ml   Net             1045 ml       Diet:  DIET TUBE FEED CONTINUOUS/CYCLIC NPO; Fluid Restricted; Gastrostomy; Cyclic; 40; 75; 14    Exam:  /74  Pulse 105  Temp 97 °F (36.1 °C) (Oral)   Resp 16  Ht 6' (1.829 m)  Wt 120 lb (54.4 kg)  SpO2 98%  BMI 16.27 kg/m2    General appearance: No apparent distress, appears older than stated age and cooperative. HEENT: Pupils equal, round, and reactive to light.  Conjunctivae / corneas clear.  Neck: Supple, with full range of motion. Has tracheostomy, clean  Respiratory:  Normal respiratory effort. Scattered anterior rhonchi same as before  Cardiovascular: Regular rate and rhythm with normal S1/S2 without murmurs, rubs or gallops. Abdomen: Soft, non-tender, non-distended with normal bowel sounds. Musculoskeletal: has clubbing, no cyanosis or edema bilaterally. Full range of motion without deformity. Skin: Skin color, texture, turgor normal.  No rashes or lesions. Neurologic:  Neurovascularly intact without any focal sensory/motor deficits. Cranial nerves: II-XII intact, grossly non-focal.  Psychiatric: Alert and oriented, thought content appropriate, normal insight        Labs:   Recent Labs      10/04/17   0549   WBC  4.4*   HGB  11.2*   HCT  32.8*   PLT  196     Recent Labs      10/04/17   0549  10/06/17   0530   NA  136   --    K  3.9   --    CL  100   --    CO2  24   --    BUN  6*   --    CREATININE  0.6  0.6   CALCIUM  9.1   --      No results for input(s): AST, ALT, BILIDIR, BILITOT, ALKPHOS in the last 72 hours. Recent Labs      10/03/17   2012   INR  0.99     No results for input(s): Kourtney Hurl in the last 72 hours. Urinalysis:    No results found for: Gustabo Moralez, BACTERIA, RBCUA, BLOODU, [address]    Radiology:  CTA Chest W WO Contrast   Final Result   1. No CT evidence of pulmonary embolus. 2. There are retained mucous secretions and/or aspirated contents present within the bilateral lower lobe bronchi. The lower lobe bronchi are nearly entirely opacified. 3. Patchy groundglass and centrilobular nodular densities are demonstrated within the lower lobes bilaterally. This may represent sequela from aspiration pneumonia and/or sequela from an atypical mycobacterial infectious pneumonia. Recommend continued    follow-up to document resolution. **This report has been created using voice recognition software.   It may contain minor errors which are inherent in voice recognition technology. **          Final report electronically signed by Dr. Tg Michelle on 10/3/2017 1:12 PM      XR CHEST STANDARD (2 VW)   Final Result   Mild accentuation of the bronchovascular markings, best demonstrated on the lateral view. Possible very tiny left pleural effusion posteriorly. **This report has been created using voice recognition software. It may contain minor errors which are inherent in voice recognition technology. **      Final report electronically signed by Dr. Yarelis Urena on 10/3/2017 12:02 PM          Diet: DIET TUBE FEED CONTINUOUS/CYCLIC NPO; Fluid Restricted; Gastrostomy;  Cyclic; 40; 75; 14    DVT prophylaxis: [] Lovenox                                 [x] SCDs                                 [] SQ Heparin                                 [] Encourage ambulation           [] Already on Anticoagulation     Disposition:    [] Home       [] TCU       [] Rehab       [] Psych       [] SNF       [] [place]       [x] Other-  Not clear yet, but most likely home    Code Status: Full Code    PT/OT Eval Status: pending    Assessment/Plan:    Anticipated Discharge in : unclear    Active Hospital Problems    Diagnosis Date Noted    Laryngeal cancer ([facility] [C32.9] 10/06/2017    Pneumonia of both lower lobes due to Pseudomonas species ([address] [J15.1] 10/06/2017    Pharyngeal dysphagia [R13.13] 10/06/2017    Bipolar affective disorder in remission ([address] [F31.70]     Mucopurulent chronic bronchitis ([address] [J41.1]     Acute hypoxemic respiratory failure ([address] [J96.01] 10/04/2017    Severe malnutrition ([address] [E43] 10/04/2017    Abnormal CT scan, chest [R93.8]     SOB (shortness of breath) [R06.02] 10/03/2017    Pneumonia of both lungs due to group B Streptococcus ([address] [J15.3] 10/03/2017    Head and neck cancer (HCC) [C76.0] 07/26/2017    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

## 2017-10-06 NOTE — PROGRESS NOTES
Education:     [x] [employer] understanding  [] Demonstrates with assistance     [] Demonstrates without assistance [x]Needs further instruction     [] No evidence of learning  [x] Family not present    PATIENT GOALS: [] Pt did not state. Will further assess during treatment. [] Return to the least restricted diet possible     [x] Return to previous level of function     [] OTHER:    PLAN / TREATMENT RECOMMENDATIONS:  [x] Skilled SLP intervention on acute care 3-5 x per week or until goals met and/or pt plateaus in function. Specific interventions for next session may include: swallowing exercises and ice chip trials with SLP    SHORT TERM GOALS:  Short-term Goals  Timeframe for Short-term Goals: 2 weeks  Goal 1: Pt will complete phayrngeal strengthening exercises as indicated 10x each with good success to improve airway protection  Goal 2: Pt will tolerate trials of ice chips without overt aspiration 10/10 times to determine readiness for further instrumental assessment.     Goal 3: Complete instrumental assessment as indicated       Bruce Murray M.S. [id number]

## 2017-10-07 PROCEDURE — 6370000000 HC RX 637 (ALT 250 FOR IP): Performed by: INTERNAL MEDICINE

## 2017-10-07 PROCEDURE — 99232 SBSQ HOSP IP/OBS MODERATE 35: CPT | Performed by: INTERNAL MEDICINE

## 2017-10-07 PROCEDURE — 1200000000 HC SEMI PRIVATE

## 2017-10-07 PROCEDURE — 2580000003 HC RX 258: Performed by: INTERNAL MEDICINE

## 2017-10-07 PROCEDURE — 94640 AIRWAY INHALATION TREATMENT: CPT

## 2017-10-07 PROCEDURE — C9113 INJ PANTOPRAZOLE SODIUM, VIA: HCPCS | Performed by: INTERNAL MEDICINE

## 2017-10-07 PROCEDURE — 6360000002 HC RX W HCPCS: Performed by: INTERNAL MEDICINE

## 2017-10-07 PROCEDURE — 2700000000 HC OXYGEN THERAPY PER DAY

## 2017-10-07 RX ADMIN — TRAZODONE HYDROCHLORIDE 50 MG: 50 TABLET ORAL at 00:54

## 2017-10-07 RX ADMIN — Medication 6.25 MG: at 21:23

## 2017-10-07 RX ADMIN — PIPERACILLIN SODIUM,TAZOBACTAM SODIUM 3.38 G: 3; .375 INJECTION, POWDER, FOR SOLUTION INTRAVENOUS at 14:39

## 2017-10-07 RX ADMIN — Medication 300 MG: at 09:33

## 2017-10-07 RX ADMIN — SODIUM CHLORIDE: 9 INJECTION, SOLUTION INTRAVENOUS at 09:01

## 2017-10-07 RX ADMIN — PIPERACILLIN SODIUM,TAZOBACTAM SODIUM 3.38 G: 3; .375 INJECTION, POWDER, FOR SOLUTION INTRAVENOUS at 05:53

## 2017-10-07 RX ADMIN — PANTOPRAZOLE SODIUM 40 MG: 40 INJECTION, POWDER, FOR SOLUTION INTRAVENOUS at 05:53

## 2017-10-07 RX ADMIN — ONDANSETRON 4 MG: 4 TABLET, ORALLY DISINTEGRATING ORAL at 20:41

## 2017-10-07 RX ADMIN — PIPERACILLIN SODIUM,TAZOBACTAM SODIUM 3.38 G: 3; .375 INJECTION, POWDER, FOR SOLUTION INTRAVENOUS at 21:23

## 2017-10-07 RX ADMIN — Medication 6.25 MG: at 00:59

## 2017-10-07 ASSESSMENT — PAIN SCALES - GENERAL
PAINLEVEL_OUTOF10: 0
PAINLEVEL_OUTOF10: 5
PAINLEVEL_OUTOF10: 0

## 2017-10-07 NOTE — PLAN OF CARE
Problem: Risk for Impaired Skin Integrity  Goal: Tissue integrity - skin and mucous membranes  Structural intactness and normal physiological function of skin and  mucous membranes. Outcome: Ongoing  Patient has tracheostomy and peg tube. No skin breakdown noted at this time. Patient turns and repositions self. Problem: Pain:  Goal: Pain level will decrease  Pain level will decrease   Outcome: Ongoing  Patient complains of chronic pain. Patient wears a fentanyl patch and also uses PRN fentanyl sublingual spray. Problem: Respiratory  Goal: No pulmonary complications  Outcome: Ongoing  Patient has tracheostomy in place. Patients pulse ox is 97% on room air. Problem: Nutrition  Goal: Optimal nutrition therapy  Outcome: Ongoing  Tube feed of Two Basilio HN at 40ml/hr was started this shift through patients PEG tube. So far patient tolerating tube feed well. Patient denies nausea. Problem: Discharge Planning:  Goal: Patients continuum of care needs are met  Patients continuum of care needs are met   Outcome: Ongoing  Patient plans to return home at discharge. Problem: Falls - Risk of  Goal: Absence of falls  Outcome: Ongoing  Patient ambulates independently in room and in hallway with steady gait. Patient refuses bed alarm. Comments:   Care plan reviewed with patient. Patient verbalizes understanding of the plan of care and contribute to goal setting.

## 2017-10-07 NOTE — PLAN OF CARE
Problem: Impaired respiratory status  Goal: Clear lung sounds  Clear lung sounds  Outcome: Ongoing  Breath sounds clear and diminished- continue meds as ordered

## 2017-10-07 NOTE — CONSULTS
[address] [place] [address]                                 CONSULTATION    PATIENT NAME: Jody Cherry                      :             1970  MED REC NO:   [id number]                            ROOM:            0022  ACCOUNT NO:   [de-identified]                            ADMISSION DATE:  10/03/2017  PROVIDER:    Tasha Robles M.D.    Elle Pantoja:  10/06/2017    CONSULT TO:  Dorys Norris M.D. REASON FOR CONSULTATION:  For evaluation of Pseudomonas pneumonia. HISTORY OF PRESENT ILLNESS:  This is a nice 64-year-old gentleman with  history of problems with epiglottic squamous cell carcinoma. He is  followed in [facility] and at [facility], and ending up with  problems of shortness of breath and admitted to the hospital for that  on 10/03/2017. The patient has been a long-term smoker. The patient  was also having some cough with greenish brown expectoration. 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 He is still getting  chemo and radiation. 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 The culture is positive in sputum and so Infectious  Disease evaluation is requested. It appears that the trach is not  frequently being changed. PAST MEDICAL HISTORY:  Significant for pneumonia, GERD, COPD, throat  cancer, and bipolar disorder. PAST SURGICAL HISTORY:  Include tracheostomy and PEG tube placement,  bronchoscopy, and appendectomy. FAMILY HISTORY:  Significant for COPD. SOCIAL HISTORY:  Significant for smoking and alcohol use. He is  . He is sexually active. CURRENT MEDICATIONS:  He is on Zosyn, Colace, Zithromax, Rocephin, and  Brad nebulizer. REVIEW OF SYSTEMS:  Significant for problems with his shortness of  breath, cough essentially in the presence of pseudomonas in the lungs,  abdominal pain, diarrhea, and dysuria.   He does have nasal MRSA  carrier state.  _____ 5:05 abdominal pain, diarrhea, and dysuria. PHYSICAL EXAMINATION:  VITAL SIGNS:  Blood pressure 97/66, pulse rate is 89, temperature 97. HEENT:  Head is atraumatic, normocephalic. Pupils are equal and  reactive to light. NECK:  Supple. Trach in place. LUNGS:  Air entry positive with rhonchi heard. ABDOMEN:  Soft. PEG tube in place. EXTREMITIES:  No pedal edema. CNS:  No gross deficits. LABORATORY DATA:  On reviewing the patient's labs, BUN is noted to be  6, creatinine is 0.6, and potassium is 3.6. WBC 12.4, hemoglobin  11.2, platelets are 239. DIAGNOSTIC DATA:  The patient's sputum culture showing growth of  Pseudomonas, sensitivity pending.  _On reviewing the patient's  radiological studies, CAT scan of the chest is noted to show mucus  secretions with ground-glass opacity. Chest x-ray did show tiny  pleural effusion and COPD changes. DIAGNOSTIC IMPRESSION:  1. Pseudomonas pneumonia. 2.  Laryngeal cancer status post tracheostomy. 3.  Tracheitis. 4.  COPD. PLAN:  The patient to continue with current antibiotic therapy with  Zosyn and Brad, and depending upon the clinical response, taper  treatment accordingly. The tracheostomy needs to be changed more  frequently than normal. If that is possible, we will just end up trach considered. Care plan,course of treatment, and side effects of antibiotic therapy discussed  with the patient and family members.         Robb Langley M.D.    D: 10/06/2017 18:27:34       T: 10/06/2017 20:20:41     [id number]  Job#: [id number]     Doc#: [id number]

## 2017-10-07 NOTE — PROGRESS NOTES
Upon entering room to give patient sleeping pill, kangaroo pump found to be turned off. After giving sleeping pill via PEG tube, kangaroo pump turned back on.      0130  Patients spouse called nurse into room. Patient found spitting in trash can. Patients spouse stated that after kangaroo pump restarted he vomited. Patients spouse stated he did the same thing after we initially started the tube feed. Patient stated he didn't want connected to the tube feed any more tonight. Will continue to monitor.

## 2017-10-07 NOTE — PLAN OF CARE
Problem: Risk for Impaired Skin Integrity  Goal: Tissue integrity - skin and mucous membranes  Structural intactness and normal physiological function of skin and  mucous membranes. Outcome: Ongoing  No new skin breakdown noted this shift. Problem: Pain:  Goal: Pain level will decrease  Pain level will decrease   Outcome: Ongoing  Patient accepts PRN Fentanyl SL, as ordered, to aid in decreasing pain. PRN medication effective in decreasing patient's pain to stated pain goal. Continuing to assess for increases in pain level with hourly rounding. Problem: Respiratory  Goal: No pulmonary complications  Outcome: Ongoing  Lung sounds diminished in all lung fields upon auscultation this shift. Goal: O2 Sat > 90%  Outcome: Ongoing  SpO2 97% this shift. Problem: Nutrition  Goal: Optimal nutrition therapy  Outcome: Ongoing  Reports from night shift that patient refused to accept continuous tube feed. Reported that patient states \"it makes me throw up. \" Patient remains NPO this shift. Problem: Discharge Planning:  Goal: Patients continuum of care needs are met  Patients continuum of care needs are met   Outcome: Ongoing  Continues to participate in current discharge plan. Patient plans to discharge home with home health services. Problem: Falls - Risk of  Goal: Absence of falls  Outcome: Ongoing  Fall assessment completed. Patient utilizes call light appropriately for needs. Patient accepts non-skid socks as fall preventative. Problem: Airway Clearance - Ineffective:  Goal: Clear lung sounds  Clear lung sounds   Outcome: Ongoing  Lung sounds diminished in all lung fields upon auscultation. Comments: Plan of care reviewed with patient. Patient verbalizes understanding and participates in goal setting.

## 2017-10-07 NOTE — PROGRESS NOTES
rhythm with normal S1/S2 without murmurs, rubs or gallops. Abdomen: Soft, non-tender, non-distended with normal bowel sounds. Musculoskeletal: has clubbing, no cyanosis or edema bilaterally. Full range of motion without deformity. Skin: Skin color, texture, turgor normal.  No rashes or lesions. Neurologic:  Neurovascularly intact without any focal sensory/motor deficits. Cranial nerves: II-XII intact, grossly non-focal.            Labs:   No results for input(s): WBC, HGB, HCT, PLT in the last 72 hours. Recent Labs      10/06/17   0530   CREATININE  0.6     No results for input(s): AST, ALT, BILIDIR, BILITOT, ALKPHOS in the last 72 hours. No results for input(s): INR in the last 72 hours. No results for input(s): Lodema  in the last 72 hours. Urinalysis:    No results found for: Terrilee Nones, BACTERIA, RBCUA, BLOODU, [address], [address]    Radiology:  CTA Chest W WO Contrast   Final Result   1. No CT evidence of pulmonary embolus. 2. There are retained mucous secretions and/or aspirated contents present within the bilateral lower lobe bronchi. The lower lobe bronchi are nearly entirely opacified. 3. Patchy groundglass and centrilobular nodular densities are demonstrated within the lower lobes bilaterally. This may represent sequela from aspiration pneumonia and/or sequela from an atypical mycobacterial infectious pneumonia. Recommend continued    follow-up to document resolution. **This report has been created using voice recognition software. It may contain minor errors which are inherent in voice recognition technology. **          Final report electronically signed by Dr. Anita Olmstead on 10/3/2017 1:12 PM      XR CHEST STANDARD (2 VW)   Final Result   Mild accentuation of the bronchovascular markings, best demonstrated on the lateral view. Possible very tiny left pleural effusion posteriorly. **This report has been created using voice recognition software.   It may contain minor errors which are inherent in voice recognition technology. **      Final report electronically signed by Dr. Ajay Rhodes on 10/3/2017 12:02 PM          Diet: DIET TUBE FEED CONTINUOUS/CYCLIC NPO; Fluid Restricted; Gastrostomy; Cyclic; 40; 75; 14    DVT prophylaxis: [] Lovenox                                 [x] SCDs                                 [] SQ Heparin                                 [] Encourage ambulation           [] Already on Anticoagulation     Disposition:    [] Home       [] TCU       [] Rehab       [] Psych       [] SNF       [] [place]       [x] Other-  Not clear yet, but most likely home    Code Status: Full Code    PT/OT Eval Status: pending    Assessment/Plan:    Anticipated Discharge in : unclear    Active Hospital Problems    Diagnosis Date Noted    Laryngeal cancer ([facility] [C32.9] 10/06/2017    Pneumonia of both lower lobes due to Pseudomonas species ([address] [J15.1] 10/06/2017    Pharyngeal dysphagia [R13.13] 10/06/2017    Bipolar affective disorder in remission ([address] [F31.70]     Mucopurulent chronic bronchitis ([address] [J41.1]     Acute hypoxemic respiratory failure ([address] [J96.01] 10/04/2017    Severe malnutrition ([address] [E43] 10/04/2017    Abnormal CT scan, chest [R93.8]     SOB (shortness of breath) [R06.02] 10/03/2017    Pneumonia of both lungs due to group B Streptococcus ([address] [J15.3] 10/03/2017    Head and neck cancer (HCC) [C76.0] 07/26/2017    Factor VIII deficiency ([address] Atlee Rain 12/13/2015    Cigarette nicotine dependence with nicotine-induced disorder [F17.219]     COPD (chronic obstructive pulmonary disease) ([address] [J44.9]     GERD (gastroesophageal reflux disease) [K21.9]      PLAN:    1. ID consulted for pseudomonas pneumonia  2. Patient is NPO, on TF through PEG. However, patient seems to turn the tube feeding pump off and on at will. Compliance reemphasized   3. D/w nursing  4. Monitor CBC and BMP  5.  No discharge yet    Electronically signed by

## 2017-10-07 NOTE — PROGRESS NOTES
[facility]    Patient - Jennifer Zavala,  Age - 40 y.o.    - 1970      Room Number - Jayjay Joshua MD Primary Care Physician - No primary care provider on file. MRN -  [id number]   [facility]  Date of Admission -  10/3/2017 11:11 AM  Hospital Day - 4    Chief Complaint   Following for pneumonia  Following for pneumonia; had bronch 10/4  HPI   Jennifer Zavala is a 40 y.o. 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 He used to follow with Dr. Simran Cannon. He was referred to [facility]. He underwent tracheostomy, esophagoscopy at [facility] along with PEG placement. The biopsy of Epiglottic mass and left base of tongue showed invasive squamous cell carcinoma. He is currently following with Dr. Jada Parekh.      He was admitted under hospitalist service. Pulmonary medicine was consulted for further management of bilateral pneumonia.     Past 24 hours, ROS   -Afebrile  -Reports \"no SOB\"  -trach in place 8LPM via 30% trach collar  -Managing own secretions  -NPO TF via PEG  -Emesis x1 overnight   All other sytems reviewed  Objective    Vitals    height is 6' (1.829 m) and weight is 120 lb (54.4 kg). His oral temperature is 98.3 °F (36.8 °C). His blood pressure is 100/68 and his pulse is 86. His respiration is 16 and oxygen saturation is 91%. O2 Flow Rate (L/min): 8 L/min (per bottle instructions for 30%)  I/O    Intake/Output Summary (Last 24 hours) at 10/07/17 1512  Last data filed at 10/07/17 1426   Gross per 24 hour   Intake             1598 ml   Output                0 ml   Net             1598 ml     No data found. Exam    Constitutional: No distress on RA during exam. Patient appears thin. Head: Normocephalic and atraumatic. Mouth/Throat: Oropharynx is clear and moist.  No oral thrush. Eyes: Conjunctivae are normal. Pupils are equal, round. No scleral icterus. hours.  Glucose  No results for input(s): POCGLU in the last 72 hours. UA No results for input(s): SPECGRAV, PHUR, COLORU, CLARITYU, MUCUS, PROTEINU, BLOODU, RBCUA, WBCUA, BACTERIA, NITRU, GLUCOSEU, BILIRUBINUR, UROBILINOGEN, KETUA, LABCAST, LABCASTTY, AMORPHOS in the last 72 hours. Invalid input(s): CRYSTALS. EKG   Sinus tachycardia  Biatrial enlargement  Rightward axis  Pulmonary disease pattern  Abnormal ECG  When compared with ECG of 20-JUN-2017 16:37,  No significant change was found    Echo   N/A  Cultures    Procalcitonin   No results found for: PROCAL     Blood cx x 2 (-) prelim. Bronch washings:  Respiratory Culture 10/04/2017  3:42  [facility]      Normal jessy- preliminary     Gram Stain Result 10/04/2017  3:42  [facility]     Moderate segmented neutrophils observed. Few gram positive cocci in pairs and clusters. Few gram positive cocci in chains   Rare gram positive bacilli.            Blood cx x 2 (-) prelim. AFB (-)  Fungus (-)  Pneumocystis organisms (-)  Sputum cx:  Value: Pseudomonas aeruginosa (Abnormal)      Comment:      *Additional information available - narrative           10/6/2017  8:43 AM - John Paul, [facility] Incoming Lab Results From [facility]       Component Results      Component Collected Lab     Respiratory Culture (Abnormal) 10/04/2017  3:42  [facility]     Normal jessy- preliminary   At least one of drugs in current antibiotic therapy is   ineffective in vitro for isolate.        Gram Stain Result 10/04/2017  3:42  [facility]     Moderate segmented neutrophils observed. Few gram positive cocci in pairs and clusters.    Few gram positive cocci in chains   Rare gram positive bacilli.        Organism (Abnormal) 10/04/2017  3:42  [facility]     Pseudomonas aeruginosa     Respiratory Culture 10/04/2017  3:42  [facility]     moderate growth         Bronch washings: (-) malignant cells seen but marked acute inflammation  Pneumocystis-Negative  Legionella and Strep (P)  Radiology    CXR  Oct 3, 2017  PROCEDURE: XR CHEST STANDARD TWO VW  Mild accentuation of the bronchovascular markings, best demonstrated on the lateral view. Possible very tiny left pleural effusion posteriorly.       CT Scans  (See actual reports for details)             Oct 3, 2017  PROCEDURE: CTA CHEST W WO CONTRAST  1. No CT evidence of pulmonary embolus. 2. There are retained mucous secretions and/or aspirated contents present within the bilateral lower lobe bronchi. The lower lobe bronchi are nearly entirely opacified. 3. Patchy groundglass and centrilobular nodular densities are demonstrated within the lower lobes bilaterally. This may represent sequela from aspiration pneumonia and/or sequela from an atypical mycobacterial infectious pneumonia.  Recommend continued follow-up to document resolution.    Active Hospital Problem List      Active Hospital Problems    Diagnosis Date Noted    Laryngeal cancer [facility]) [C32.9] 10/06/2017    Pneumonia of both lower lobes due to Pseudomonas species ([facility] [J15.1] 10/06/2017    Pharyngeal dysphagia [R13.13] 10/06/2017    Bipolar affective disorder in remission ([address] [F31.70]     Mucopurulent chronic bronchitis (HCC) [J41.1]     Acute hypoxemic respiratory failure (HCC) [J96.01] 10/04/2017    Severe malnutrition ([address] [E43] 10/04/2017    Abnormal CT scan, chest [R93.8]     SOB (shortness of breath) [R06.02] 10/03/2017    Pneumonia of both lungs due to group B Streptococcus (HCC) [J15.3] 10/03/2017    Head and neck cancer (HCC) [C76.0] 07/26/2017    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 [F17.219]     COPD (chronic obstructive pulmonary disease) (HCC) [J44.9]     GERD (gastroesophageal reflux disease) [K21.9]      Assessment and Plan   Bilateral pneumonia with Sputum cx growing similar trach I.e #[id number] emily.  -Will sign off on the case from pulmonary point of view. -Will follow PRN. -Call [phone] with questions.       Raquel Romo MD 10/7/2017 5:27 PM

## 2017-10-08 PROBLEM — E87.6 HYPOKALEMIA: Status: ACTIVE | Noted: 2017-10-08

## 2017-10-08 LAB
ANION GAP SERPL CALCULATED.3IONS-SCNC: 11 MEQ/L (ref 8–16)
ANISOCYTOSIS: ABNORMAL
BASOPHILS # BLD: 1 %
BASOPHILS ABSOLUTE: 0 THOU/MM3 (ref 0–0.1)
BUN BLDV-MCNC: 4 MG/DL (ref 7–22)
CALCIUM IONIZED: 1.19 MMOL/L (ref 1.12–1.32)
CALCIUM SERPL-MCNC: 7.8 MG/DL (ref 8.5–10.5)
CHLORIDE BLD-SCNC: 104 MEQ/L (ref 98–111)
CO2: 23 MEQ/L (ref 23–33)
CREAT SERPL-MCNC: 0.7 MG/DL (ref 0.4–1.2)
EOSINOPHIL # BLD: 3.2 %
EOSINOPHILS ABSOLUTE: 0.1 THOU/MM3 (ref 0–0.4)
GFR SERPL CREATININE-BSD FRML MDRD: > 90 ML/MIN/1.73M2
GLUCOSE BLD-MCNC: 154 MG/DL (ref 70–108)
HCT VFR BLD CALC: 30.6 % (ref 42–52)
HEMOGLOBIN: 10.5 GM/DL (ref 14–18)
LYMPHOCYTES # BLD: 16.8 %
LYMPHOCYTES ABSOLUTE: 0.6 THOU/MM3 (ref 1–4.8)
MAGNESIUM: 1.4 MG/DL (ref 1.6–2.4)
MCH RBC QN AUTO: 33.9 PG (ref 27–31)
MCHC RBC AUTO-ENTMCNC: 34.4 GM/DL (ref 33–37)
MCV RBC AUTO: 98.7 FL (ref 80–94)
MONOCYTES # BLD: 15.6 %
MONOCYTES ABSOLUTE: 0.6 THOU/MM3 (ref 0.4–1.3)
NUCLEATED RED BLOOD CELLS: 0 /100 WBC
PDW BLD-RTO: 16.6 % (ref 11.5–14.5)
PLATELET # BLD: 195 THOU/MM3 (ref 130–400)
PMV BLD AUTO: 9 MCM (ref 7.4–10.4)
POTASSIUM SERPL-SCNC: 2.9 MEQ/L (ref 3.5–5.2)
RBC # BLD: 3.1 MILL/MM3 (ref 4.7–6.1)
RBC # BLD: NORMAL 10*6/UL
SEG NEUTROPHILS: 63.4 %
SEGMENTED NEUTROPHILS ABSOLUTE COUNT: 2.4 THOU/MM3 (ref 1.8–7.7)
SODIUM BLD-SCNC: 138 MEQ/L (ref 135–145)
WBC # BLD: 3.8 THOU/MM3 (ref 4.8–10.8)

## 2017-10-08 PROCEDURE — C9113 INJ PANTOPRAZOLE SODIUM, VIA: HCPCS | Performed by: INTERNAL MEDICINE

## 2017-10-08 PROCEDURE — 80048 BASIC METABOLIC PNL TOTAL CA: CPT

## 2017-10-08 PROCEDURE — 99231 SBSQ HOSP IP/OBS SF/LOW 25: CPT | Performed by: INTERNAL MEDICINE

## 2017-10-08 PROCEDURE — 85025 COMPLETE CBC W/AUTO DIFF WBC: CPT

## 2017-10-08 PROCEDURE — 2580000003 HC RX 258: Performed by: INTERNAL MEDICINE

## 2017-10-08 PROCEDURE — 83735 ASSAY OF MAGNESIUM: CPT

## 2017-10-08 PROCEDURE — 6370000000 HC RX 637 (ALT 250 FOR IP): Performed by: INTERNAL MEDICINE

## 2017-10-08 PROCEDURE — 36415 COLL VENOUS BLD VENIPUNCTURE: CPT

## 2017-10-08 PROCEDURE — 6360000002 HC RX W HCPCS: Performed by: INTERNAL MEDICINE

## 2017-10-08 PROCEDURE — 1200000000 HC SEMI PRIVATE

## 2017-10-08 PROCEDURE — 82330 ASSAY OF CALCIUM: CPT

## 2017-10-08 RX ORDER — MAGNESIUM SULFATE IN WATER 40 MG/ML
2 INJECTION, SOLUTION INTRAVENOUS ONCE
Status: COMPLETED | OUTPATIENT
Start: 2017-10-08 | End: 2017-10-08

## 2017-10-08 RX ORDER — MAGNESIUM HYDROXIDE/ALUMINUM HYDROXICE/SIMETHICONE 120; 1200; 1200 MG/30ML; MG/30ML; MG/30ML
30 SUSPENSION ORAL EVERY 6 HOURS PRN
Status: DISCONTINUED | OUTPATIENT
Start: 2017-10-08 | End: 2017-10-09 | Stop reason: HOSPADM

## 2017-10-08 RX ORDER — POTASSIUM CHLORIDE 7.45 MG/ML
10 INJECTION INTRAVENOUS
Status: COMPLETED | OUTPATIENT
Start: 2017-10-08 | End: 2017-10-08

## 2017-10-08 RX ADMIN — PIPERACILLIN SODIUM,TAZOBACTAM SODIUM 3.38 G: 3; .375 INJECTION, POWDER, FOR SOLUTION INTRAVENOUS at 22:31

## 2017-10-08 RX ADMIN — Medication 6.25 MG: at 17:44

## 2017-10-08 RX ADMIN — POTASSIUM CHLORIDE 10 MEQ: 10 INJECTION, SOLUTION INTRAVENOUS at 16:11

## 2017-10-08 RX ADMIN — POTASSIUM CHLORIDE 10 MEQ: 10 INJECTION, SOLUTION INTRAVENOUS at 11:53

## 2017-10-08 RX ADMIN — POTASSIUM CHLORIDE 10 MEQ: 10 INJECTION, SOLUTION INTRAVENOUS at 14:29

## 2017-10-08 RX ADMIN — MAGNESIUM SULFATE HEPTAHYDRATE 2 G: 40 INJECTION, SOLUTION INTRAVENOUS at 11:53

## 2017-10-08 RX ADMIN — DOCUSATE SODIUM 100 MG: 50 LIQUID ORAL at 16:16

## 2017-10-08 RX ADMIN — POTASSIUM CHLORIDE 10 MEQ: 10 INJECTION, SOLUTION INTRAVENOUS at 09:39

## 2017-10-08 RX ADMIN — POTASSIUM CHLORIDE 10 MEQ: 10 INJECTION, SOLUTION INTRAVENOUS at 17:48

## 2017-10-08 RX ADMIN — POTASSIUM CHLORIDE 10 MEQ: 10 INJECTION, SOLUTION INTRAVENOUS at 19:47

## 2017-10-08 RX ADMIN — PANTOPRAZOLE SODIUM 40 MG: 40 INJECTION, POWDER, FOR SOLUTION INTRAVENOUS at 05:57

## 2017-10-08 RX ADMIN — TRAZODONE HYDROCHLORIDE 50 MG: 50 TABLET ORAL at 01:18

## 2017-10-08 RX ADMIN — PIPERACILLIN SODIUM,TAZOBACTAM SODIUM 3.38 G: 3; .375 INJECTION, POWDER, FOR SOLUTION INTRAVENOUS at 05:57

## 2017-10-08 RX ADMIN — PIPERACILLIN SODIUM,TAZOBACTAM SODIUM 3.38 G: 3; .375 INJECTION, POWDER, FOR SOLUTION INTRAVENOUS at 14:30

## 2017-10-08 RX ADMIN — SODIUM CHLORIDE: 9 INJECTION, SOLUTION INTRAVENOUS at 18:34

## 2017-10-08 RX ADMIN — ALUMINUM HYDROXIDE, MAGNESIUM HYDROXIDE, AND SIMETHICONE 30 ML: 200; 200; 20 SUSPENSION ORAL at 16:08

## 2017-10-08 RX ADMIN — ONDANSETRON 4 MG: 4 TABLET, ORALLY DISINTEGRATING ORAL at 20:35

## 2017-10-08 ASSESSMENT — PAIN SCALES - GENERAL: PAINLEVEL_OUTOF10: 0

## 2017-10-08 NOTE — PLAN OF CARE
Problem: Risk for Impaired Skin Integrity  Goal: Tissue integrity - skin and mucous membranes  Structural intactness and normal physiological function of skin and  mucous membranes. Outcome: Ongoing  No new skin breakdown noted this shift. Problem: Pain:  Goal: Pain level will decrease  Pain level will decrease   Outcome: Ongoing  Patient accepts PRN Fentanyl SL, as ordered, this shift for c/o pain. Patient continues to accept Fentanyl patch, as ordered, to aid in managing chronic pain. Continuing to assess for increases in pain level with hourly rounding. Problem: Respiratory  Goal: No pulmonary complications  Outcome: Ongoing  Lung sound diminished in all lung fields this shift. Goal: O2 Sat > 90%  Outcome: Ongoing  SpO2 94% this shift. Problem: Nutrition  Goal: Optimal nutrition therapy  Outcome: Ongoing  Patient accepts continuous tube feed, as ordered, without difficulty. Problem: Discharge Planning:  Goal: Patients continuum of care needs are met  Patients continuum of care needs are met   Outcome: Ongoing  Discharge plan reviewed with patient. Patient continues to actively participate in current discharge plan. Problem: Falls - Risk of  Goal: Absence of falls  Outcome: Ongoing  Fall assessment completed. Patient utilizes call light appropriately for needs. Patient accepts non-skid socks as fall preventative. Good safety awareness noted. Comments: Plan of care reviewed with patient. Patient verbalizes understanding and participates in goal setting.

## 2017-10-08 NOTE — PROGRESS NOTES
Patient resting in bed with eyes open. Patient denies any pain or needs at this time. Report given and care turned over to [facility].  Electronically signed by Wm HALL/PACO on 10/7/2017 at 9:19 PM

## 2017-10-08 NOTE — PLAN OF CARE
Problem: Risk for Impaired Skin Integrity  Goal: Tissue integrity - skin and mucous membranes  Structural intactness and normal physiological function of skin and  mucous membranes. Outcome: Ongoing  No skin breakdown noted this shift. Problem: Pain:  Goal: Pain level will decrease  Pain level will decrease   Outcome: Ongoing  Patient complains of chronic pain. Patient has fentanyl patch in place and also takes sublingual fentanyl spray. Problem: Respiratory  Goal: No pulmonary complications  Outcome: Ongoing  Patients pulse ox is 97% on trach mask. Patients lung sounds diminished throughout. Problem: Nutrition  Goal: Optimal nutrition therapy  Outcome: Ongoing  Patient is on schedule of having tube feed run from 1900 to 0900.  2 Basilio HN running at 40 ml/hr. Patient tolerating tube feed well so far. Patient denies nausea. Problem: Falls - Risk of  Goal: Absence of falls  Outcome: Ongoing  Patient free from falls this shift. Patient ambulates independently in room and nunez with steady gait. Patient uses call light appropriately. Comments: Care plan reviewed with patient. Patient verbalizes understanding of the plan of care and contribute to goal setting.

## 2017-10-08 NOTE — PROGRESS NOTES
Attempted to suction tracheostomy but unable to advance suction catheter past inner cannula. Patient allows this nurse to change inner cannula and tracheostomy care provided. Moderate amount of thick, clear mucous noted once inner cannula removed. Following tracheostomy care, inner cannula replaced and attempt to suction tracheostomy again. Patient allows this nurse to suction tracheostomy once, with a moderate amount of thick, opaque sputum noted with tracheostomy suctioning. Attempted to re-suction tracheostomy again, but patient refuses to allow further suctioning at this time. Patient educated regarding excessive secretions and need for suctioning. Patient continues to refuse suctioning again at this time and states \"I can breath now. \" Respirations unlabored at this time. Will continue to monitor.

## 2017-10-08 NOTE — PROGRESS NOTES
light. Conjunctivae / corneas clear. Neck: Supple, with full range of motion. Has tracheostomy, clean  Respiratory:  Normal respiratory effort. Scattered anterior rhonchi and crackles  Cardiovascular: Regular rate and rhythm with normal S1/S2 without murmurs, rubs or gallops. Abdomen: Soft, non-tender, non-distended with normal bowel sounds. Musculoskeletal: has clubbing, no cyanosis or edema bilaterally. Full range of motion without deformity. Skin: Skin color, texture, turgor normal.  No rashes or lesions. Neurologic:  Neurovascularly intact without any focal sensory/motor deficits. Cranial nerves: II-XII intact, grossly non-focal.    Stable exam      Labs:   Recent Labs      10/08/17   0619   WBC  3.8*   HGB  10.5*   HCT  30.6*   PLT  195     Recent Labs      10/06/17   0530  10/08/17   0619   NA   --   138   K   --   2.9*   CL   --   104   CO2   --   23   BUN   --   4*   CREATININE  0.6  0.7   CALCIUM   --   7.8*     No results for input(s): AST, ALT, BILIDIR, BILITOT, ALKPHOS in the last 72 hours. No results for input(s): INR in the last 72 hours. No results for input(s): Perlie Dorita in the last 72 hours. Urinalysis:    No results found for: Lanney Brenden, BACTERIA, RBCUA, BLOODU, [address], [address]    Radiology:  CTA Chest W WO Contrast   Final Result   1. No CT evidence of pulmonary embolus. 2. There are retained mucous secretions and/or aspirated contents present within the bilateral lower lobe bronchi. The lower lobe bronchi are nearly entirely opacified. 3. Patchy groundglass and centrilobular nodular densities are demonstrated within the lower lobes bilaterally. This may represent sequela from aspiration pneumonia and/or sequela from an atypical mycobacterial infectious pneumonia. Recommend continued    follow-up to document resolution. **This report has been created using voice recognition software.   It may contain minor errors which are inherent in voice recognition

## 2017-10-08 NOTE — PROGRESS NOTES
Patient laying in bed with eyes open. Assessment preformed. Patient reports a pain level of 5 on 0-10 scale. Denies any needs at this time.  Electronically signed by Burgess Clarke HALL/PACO on 10/7/2017 at 8:50 PM

## 2017-10-08 NOTE — PROGRESS NOTES
INFECTIOUS DISEASES  PROGRESS NOTE    Pt Name: Daniel Hu  MRN: [id number]  [id number]  YOB: 1970  Admit Date: 10/3/2017 11:11 AM  Date of evaluation: 10/8/2017  Primary Care Physician: No primary care provider on file. [id number]     52-year-old gentleman with  history of problems with epiglottic squamous cell carcinoma. He is  followed in [facility] and [facility] [place], and ending up with problems of shortness of breath and admitted to the hospital for that on 10/03/2017. The patient has been a long-term smoker. The patient was also having some cough with greenish brown expectoration. He does have stage IV epiglottic squamous cell carcinoma. He is still getting chemo and radiation. The patient does have a MRSA carrier state and now Pseudomonas. Subjective: Interval History: As above. Notes reviewed. And noted to be resting     Active ATBs: zosyn 10/7                         morgan neb    Pt/Chart review:  Fever No, DiarrheaNo, Dyspnea Yes, Nausea:None      Data:   Scheduled Meds:   potassium replacement protocol   Other RX Placeholder    calcium replacement protocol   Other RX Placeholder    magnesium replacement protocol   Other RX Placeholder    potassium chloride  10 mEq Intravenous Q2H    pantoprazole  40 mg Intravenous Daily    tobramycin (PF)  300 mg Nebulization BID    docusate  100 mg Oral TID    fentaNYL  1 patch Transdermal Q72H    nystatin  500,000 Units Mouth/Throat 4x Daily    traZODone  50 mg Oral Nightly    sodium chloride flush  10 mL Intravenous 2 times per day    piperacillin-tazobactam  3.375 g Intravenous Q8H     Continuous Infusions:   sodium chloride 60 mL/hr at 10/07/17 0901       I & O's:  I/O last 3 completed shifts:   In: 5511 [I.V.:1436; NG/GT:70]  Out: 0   I/O this shift:  In: 200 [NG/GT:200]  Out: -     Intake/Output Summary (Last 24 hours) at 10/08/17 1407  Last data filed at 10/08/17 1114   Gross per 24 hour   Intake             1706 ml Output                0 ml   Net             1706 ml       Date 10/08/17 0000 - 10/08/17 2359   Shift 3060-7027 9024-5834 4093-9794 24 Hour Total   I  N  T  A  K  E   P.O.  (mL/kg/hr) 0  (0)   0    I.V.  (mL/kg) 888  (16.3)   888  (16.3)    NG/GT  (mL/kg) 70  (1.3) 200  (3.7)  270  (5)    Shift Total  (mL/kg) 958  (17.6) 200  (3.7)  1158  (21.3)   O  U  T  P  U  T   Emesis/NG output  (mL/kg) 0  (0)   0  (0)    Shift Total  (mL/kg) 0  (0)   0  (0)   Weight (kg) 54.4 54.4 54.4 54.4           CBC:   Recent Labs      10/08/17   0619   WBC  3.8*   HGB  10.5*   PLT  195     BMP:  Recent Labs      10/06/17   0530  10/08/17   0619   NA   --   138   K   --   2.9*   CL   --   104   CO2   --   23   BUN   --   4*   CREATININE  0.6  0.7   GLUCOSE   --   154*     Hepatic: No results for input(s): AST, ALT, ALB, BILITOT, ALKPHOS in the last 72 hours. BNP: No results for input(s): BNP in the last 72 hours. URINALYSIS: na    MICRO:   10/7/2017  9:38 AM - John Paul, Katina Abu Incoming Lab Results From [facility]     Component Results     Component Collected Lab   Respiratory Culture  (Abnormal) 10/04/2017  3:42 PM [facility]    (A)   Normal jessy- preliminary   At least one of drugs in current antibiotic therapy is   ineffective in vitro for isolate. Gram Stain Result 10/04/2017  3:42 PM [facility]   Moderate segmented neutrophils observed. Few gram positive cocci in pairs and clusters. Few gram positive cocci in chains   Rare gram positive bacilli. Organism  (Abnormal) 10/04/2017  3:42 PM [facility]   Pseudomonas aeruginosa         IMAGING:   Impression   Mild accentuation of the bronchovascular markings, best demonstrated on the lateral view.  Possible very tiny left pleural effusion posteriorly.                 Objective:   Vitals: BP 98/69   Pulse 91   Temp 98.6 °F (37 °C) (Oral)   Resp 18   Ht 6' (1.829 m)   Wt 120 lb (54.4 kg)   SpO2 94%   BMI 16.27 kg/m²   HEENT: Head: Normocephalic, no lesions, without obvious abnormality. Lungs: rhonci   Heart: S1, S2 normal  Abdomen: soft, non-tender; bowel sounds normal; no masses,  no organomegaly  Extremities: extremities normal, atraumatic, no cyanosis or edema  Neurologic: Mental status: Alert, oriented, thought content appropriate        Assessment:   1. Pseudomonas pneumonia. 2.  Laryngeal cancer status post tracheostomy. 3.  Tracheitis. 4.  COPD.     Patient Active Problem List:     COPD (chronic obstructive pulmonary disease) ([address]     GERD (gastroesophageal reflux disease)     Bipolar disorder ([address]     Smoker     Bipolar affective ([address]     History of alcohol abuse     Cough with hemoptysis     Factor VIII deficiency ([address]     Hemoptysis     Pulmonary infiltrates     Chronic obstructive pulmonary disease (HCC)     Cigarette nicotine dependence with nicotine-induced disorder     Anemia associated with acute blood loss     Alcohol withdrawal syndrome without complication (HCC)     Head and neck cancer (HCC)     SOB (shortness of breath)     Pneumonia of both lungs due to group B Streptococcus (HCC)     Pneumonia of both lower lobes due to infectious organism     Acute hypoxemic respiratory failure (HCC)     Severe malnutrition (HCC)     Abnormal CT scan, chest     Bipolar affective disorder in remission ([address]     Mucopurulent chronic bronchitis (HCC)     Laryngeal cancer (HCC)     Pneumonia of both lower lobes due to Pseudomonas species ([address]     Pharyngeal dysphagia     Hypokalemia      Plan:  Continue current antibiotic  D/w hospitalist   He will likely be a long term carrier of bacteria      Labs, cultures, and radiographs reviewed. Changes made as necessary. D/w Patient's R.N. and specific instructions given and infectious disease issues addressed. Will follow the patient closely with you. Also please see the orders for updated patient care orders written by ID team.    Thank you for involving us in this patient's care.  I will

## 2017-10-09 ENCOUNTER — HOSPITAL ENCOUNTER (OUTPATIENT)
Dept: RADIATION ONCOLOGY | Age: 47
Discharge: HOME | End: 2017-10-09
Payer: MEDICARE

## 2017-10-09 VITALS
WEIGHT: 120 LBS | SYSTOLIC BLOOD PRESSURE: 102 MMHG | DIASTOLIC BLOOD PRESSURE: 70 MMHG | BODY MASS INDEX: 16.25 KG/M2 | HEART RATE: 79 BPM | OXYGEN SATURATION: 99 % | RESPIRATION RATE: 12 BRPM | TEMPERATURE: 97.6 F | HEIGHT: 72 IN

## 2017-10-09 LAB
ANION GAP SERPL CALCULATED.3IONS-SCNC: 9 MEQ/L (ref 8–16)
ANISOCYTOSIS: ABNORMAL
BASOPHILS # BLD: 1.2 %
BASOPHILS ABSOLUTE: 0 THOU/MM3 (ref 0–0.1)
BLOOD CULTURE, ROUTINE: NORMAL
BLOOD CULTURE, ROUTINE: NORMAL
BUN BLDV-MCNC: 5 MG/DL (ref 7–22)
CALCIUM SERPL-MCNC: 8.1 MG/DL (ref 8.5–10.5)
CHLORIDE BLD-SCNC: 105 MEQ/L (ref 98–111)
CO2: 25 MEQ/L (ref 23–33)
CREAT SERPL-MCNC: 0.6 MG/DL (ref 0.4–1.2)
EOSINOPHIL # BLD: 3.6 %
EOSINOPHILS ABSOLUTE: 0.1 THOU/MM3 (ref 0–0.4)
GFR SERPL CREATININE-BSD FRML MDRD: > 90 ML/MIN/1.73M2
GLUCOSE BLD-MCNC: 102 MG/DL (ref 70–108)
HCT VFR BLD CALC: 33.2 % (ref 42–52)
HEMOGLOBIN: 11.4 GM/DL (ref 14–18)
LYMPHOCYTES # BLD: 17.9 %
LYMPHOCYTES ABSOLUTE: 0.6 THOU/MM3 (ref 1–4.8)
MAGNESIUM: 1.6 MG/DL (ref 1.6–2.4)
MCH RBC QN AUTO: 33.9 PG (ref 27–31)
MCHC RBC AUTO-ENTMCNC: 34.3 GM/DL (ref 33–37)
MCV RBC AUTO: 98.9 FL (ref 80–94)
MONOCYTES # BLD: 19.2 %
MONOCYTES ABSOLUTE: 0.6 THOU/MM3 (ref 0.4–1.3)
NUCLEATED RED BLOOD CELLS: 0 /100 WBC
PDW BLD-RTO: 16.9 % (ref 11.5–14.5)
PLATELET # BLD: 207 THOU/MM3 (ref 130–400)
PMV BLD AUTO: 8.5 MCM (ref 7.4–10.4)
POTASSIUM SERPL-SCNC: 3.4 MEQ/L (ref 3.5–5.2)
RBC # BLD: 3.36 MILL/MM3 (ref 4.7–6.1)
RBC # BLD: NORMAL 10*6/UL
SEG NEUTROPHILS: 58.1 %
SEGMENTED NEUTROPHILS ABSOLUTE COUNT: 1.8 THOU/MM3 (ref 1.8–7.7)
SODIUM BLD-SCNC: 139 MEQ/L (ref 135–145)
WBC # BLD: 3.1 THOU/MM3 (ref 4.8–10.8)

## 2017-10-09 PROCEDURE — 92526 ORAL FUNCTION THERAPY: CPT

## 2017-10-09 PROCEDURE — 99239 HOSP IP/OBS DSCHRG MGMT >30: CPT | Performed by: INTERNAL MEDICINE

## 2017-10-09 PROCEDURE — 77336 RADIATION PHYSICS CONSULT: CPT

## 2017-10-09 PROCEDURE — 6370000000 HC RX 637 (ALT 250 FOR IP): Performed by: INTERNAL MEDICINE

## 2017-10-09 PROCEDURE — 80048 BASIC METABOLIC PNL TOTAL CA: CPT

## 2017-10-09 PROCEDURE — 6360000002 HC RX W HCPCS: Performed by: INTERNAL MEDICINE

## 2017-10-09 PROCEDURE — 36415 COLL VENOUS BLD VENIPUNCTURE: CPT

## 2017-10-09 PROCEDURE — 2700000000 HC OXYGEN THERAPY PER DAY

## 2017-10-09 PROCEDURE — 2580000003 HC RX 258: Performed by: INTERNAL MEDICINE

## 2017-10-09 PROCEDURE — C9113 INJ PANTOPRAZOLE SODIUM, VIA: HCPCS | Performed by: INTERNAL MEDICINE

## 2017-10-09 PROCEDURE — 94640 AIRWAY INHALATION TREATMENT: CPT

## 2017-10-09 PROCEDURE — 85025 COMPLETE CBC W/AUTO DIFF WBC: CPT

## 2017-10-09 PROCEDURE — 83735 ASSAY OF MAGNESIUM: CPT

## 2017-10-09 PROCEDURE — 77386 HC NTSTY MODUL RAD TX DLVR CPLX: CPT

## 2017-10-09 RX ORDER — POTASSIUM CHLORIDE 20MEQ/15ML
40 LIQUID (ML) ORAL ONCE
Status: COMPLETED | OUTPATIENT
Start: 2017-10-09 | End: 2017-10-09

## 2017-10-09 RX ORDER — CIPROFLOXACIN 500 MG/1
500 TABLET, FILM COATED ORAL 2 TIMES DAILY
Qty: 20 TABLET | Refills: 0 | Status: SHIPPED | OUTPATIENT
Start: 2017-10-09 | End: 2017-10-19

## 2017-10-09 RX ORDER — POTASSIUM CHLORIDE 20 MEQ/1
40 TABLET, EXTENDED RELEASE ORAL ONCE
Status: DISCONTINUED | OUTPATIENT
Start: 2017-10-09 | End: 2017-10-09 | Stop reason: DRUGHIGH

## 2017-10-09 RX ADMIN — PANTOPRAZOLE SODIUM 40 MG: 40 INJECTION, POWDER, FOR SOLUTION INTRAVENOUS at 05:24

## 2017-10-09 RX ADMIN — TRAZODONE HYDROCHLORIDE 50 MG: 50 TABLET ORAL at 02:32

## 2017-10-09 RX ADMIN — Medication 300 MG: at 10:02

## 2017-10-09 RX ADMIN — Medication 2 PUFF: at 10:00

## 2017-10-09 RX ADMIN — SODIUM CHLORIDE: 9 INJECTION, SOLUTION INTRAVENOUS at 08:29

## 2017-10-09 RX ADMIN — PIPERACILLIN SODIUM,TAZOBACTAM SODIUM 3.38 G: 3; .375 INJECTION, POWDER, FOR SOLUTION INTRAVENOUS at 05:22

## 2017-10-09 RX ADMIN — POTASSIUM CHLORIDE 40 MEQ: 1.5 SOLUTION ORAL at 10:27

## 2017-10-09 NOTE — PROGRESS NOTES
Physical Therapy  [facility]  PHYSICAL THERAPY MISSED TREATMENT NOTE  ACUTE CARE    Date: 10/9/2017  Patient Name: Mara Ruffin        MRN: [id number]   : 1970  (40 y.o.)  Gender: male   Referring Practitioner: Dr. Jhonny Hicks MD  Referring Practitioner: Dr Xenia Brewer  Diagnosis: Pneumonia  Diagnosis: Pneumonia         REASON FOR MISSED TREATMENT:  Pt getting breathing treatment upon PTA arrival. Staff notes pt has been walking halls and outside of hospital without assistance. Will try back later as time allows.

## 2017-10-09 NOTE — PROGRESS NOTES
Dr. Vandana Orellana called about CXR plan in note but no order, does not wish for patient to have CXR if planning to leave AMA. Still wants patient to go home with prn cipro.

## 2017-10-09 NOTE — PLAN OF CARE
Problem: Impaired respiratory status  Goal: Clear lung sounds  Clear lung sounds     Outcome: Ongoing  morgan to help with lung infection.

## 2017-10-09 NOTE — PROGRESS NOTES
26 Dr. Quita Serrano called regarding patient wanting to leave AMA, waiting on call back. Dr. Nuvia Cole wants patient to go home with PO cipro. Patient willing to stay until prescription is ordered. 46 Dr. Quita Serrano will send script to pharmacy, [facility] paperwork explained and signed by myself and patient. 21  Patient now saying he will stay for radiation treatment and then leave AMA after treatment is over. Manager Valeria Cabral updated.

## 2017-10-09 NOTE — PROGRESS NOTES
PSEUDOMONAS AERUGINOSA     Antibiotic Interpretation SHREYAS Unit Status   cefTAZidime Sensitive =2 mcg/mL Final   ciprofloxacin Sensitive   Final   gentamicin Sensitive <=1 mcg/mL Final   piperacillin-tazobactam Sensitive <=4 mcg/mL Final   tobramycin Sensitive               IMAGING:   Impression   Mild accentuation of the bronchovascular markings, best demonstrated on the lateral view. Possible very tiny left pleural effusion posteriorly.                 Objective:   Vitals: /70   Pulse 79   Temp 97.6 °F (36.4 °C) (Oral)   Resp 12   Ht 6' (1.829 m)   Wt 120 lb (54.4 kg)   SpO2 99%   BMI 16.27 kg/m²   HEENT: Head: Normocephalic, no lesions, without obvious abnormality. Lungs: rhonci   Heart: S1, S2 normal  Abdomen: soft, non-tender; bowel sounds normal; no masses,  no organomegaly  Extremities: extremities normal, atraumatic, no cyanosis or edema  Neurologic: Mental status: Alert, oriented, thought content appropriate        Assessment:   1. Pseudomonas pneumonia. 2.  Laryngeal cancer status post tracheostomy. 3.  Tracheitis.   4.  COPD.     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[address]     Hemoptysis     Pulmonary infiltrates     Chronic obstructive pulmonary disease (HCC)     Cigarette nicotine dependence with nicotine-induced disorder     Anemia associated with acute blood loss     Alcohol withdrawal syndrome without complication (HCC)     Head and neck cancer (HCC)     SOB (shortness of breath)     Pneumonia of both lungs due to group B Streptococcus (HCC)     Pneumonia of both lower lobes due to infectious organism     Acute hypoxemic respiratory failure (HCC)     Severe malnutrition (HCC)     Abnormal CT scan, chest     Bipolar affective disorder in remission ([address]     Mucopurulent chronic bronchitis ([address]     Laryngeal cancer ([address]     Pneumonia of both lower lobes due to Pseudomonas species ([address]     Pharyngeal dysphagia     Hypokalemia      Plan:  Continue current antibiotic--plan oral cipro for discharge if CXR better   At risk for recurrent respiratory infections  He will likely be a long term carrier of bacteria    Noted scribed in real time of face to face encounter by Dr Yahir Arzate, transcribed in [facility] after rounds were completed by Clair Armas CNP. Electronically signed by Ignacia Carl CNP on 10/9/2017 at 11:17 AM    I have reviewed and agree with the above note. The information is true and accurate.    Electronically signed by Frank Roberts MD on 10/9/17 at 1:06 PM

## 2017-10-09 NOTE — PROGRESS NOTES
[facility] is a 40 y.o. male patient.     Current Facility-Administered Medications   Medication Dose Route Frequency Provider Last Rate Last Dose    potassium chloride 20 MEQ/15ML (10%) oral solution 40 mEq  40 mEq Per G Tube Once Daniela Villasenor MD        potassium replacement protocol   Other RX Placeholder Daniela Villasenor MD        calcium replacement protocol   Other RX Placesharyn Villasenor MD        magnesium replacement protocol   Other RX Arline Villasenor MD        aluminum & magnesium hydroxide-simethicone (MAALOX) [phone] MG/5ML suspension 30 mL  30 mL Per G Tube Q6H PRN Daniela Villasenor MD   30 mL at 10/08/17 1608    pantoprazole (PROTONIX) injection 40 mg  40 mg Intravenous Daily Daniela Villasenor MD   40 mg at 10/09/17 0524    tobramycin (PF) (YAKELIN) nebulizer solution 300 mg  300 mg Nebulization BID Marcelino Kanner, MD   300 mg at 10/09/17 1002    LORazepam (ATIVAN) tablet 0.5 mg  0.5 mg Oral Q4H PRN Daniela Villasenor MD   0.5 mg at 10/06/17 1310    Fentanyl Spray 800 mcg/actuation  800 mcg Sublingual Q6H PRN Inga Udeagbala, DO   800 mcg at 10/09/17 0842    docusate (COLACE) 50 MG/5ML liquid 100 mg  100 mg Oral TID Asha Espinal MD   100 mg at 10/08/17 1616    albuterol sulfate  (90 Base) MCG/ACT inhaler 2 puff  2 puff Inhalation Q4H PRN Asha Espinal MD   2 puff at 10/09/17 1000    fentaNYL (DURAGESIC) 75 MCG/HR 1 patch  1 patch Transdermal Q72H Asha Espinal MD   1 patch at 10/08/17 0748    nystatin (MYCOSTATIN) 328403 UNIT/ML suspension 500,000 Units  500,000 Units Mouth/Throat 4x Daily Asha Espinal MD   500,000 Units at 10/04/17 2111    ondansetron (ZOFRAN-ODT) disintegrating tablet 4 mg  4 mg Oral Q8H PRN Asha Espinal MD   4 mg at 10/08/17 2035    promethazine (PHENERGAN) 6.25 MG/5ML syrup 6.25 mg  6.25 mg Oral 4x Daily PRN Asha Espinal MD   6.25 mg at 10/08/17 1744    sennosides (SENOKOT) 8.8 MG/5ML syrup 5 mL  5 mL Oral Daily

## 2017-10-09 NOTE — DISCHARGE SUMMARY
Hospital Medicine Discharge Summary      Patient Identification:   Elma Marie   : 1970  MRN: [id number]   Account: [de-identified]      Patient's PCP: No primary care provider on file. Admit Date: 10/3/2017     Discharge Date:   10/09/17     Admitting Physician: Alfred Sawyer MD     Discharge Physician: Hector Bose MD     Discharge Diagnoses: Active Hospital Problems    Diagnosis Date Noted    Hypokalemia [E87.6] 10/08/2017    Laryngeal cancer ([facility] [C32.9] 10/06/2017    Pneumonia of both lower lobes due to Pseudomonas species [facility] [J15.1] 10/06/2017    Pharyngeal dysphagia [R13.13] 10/06/2017    Bipolar affective disorder in remission ([facility] [F31.70]     Mucopurulent chronic bronchitis (HCC) [J41.1]     Acute hypoxemic respiratory failure (HCC) [J96.01] 10/04/2017    Severe malnutrition ([facility] [E43] 10/04/2017    Abnormal CT scan, chest [R93.8]     SOB (shortness of breath) [R06.02] 10/03/2017    Pneumonia of both lungs due to group B Streptococcus (HCC) [J15.3] 10/03/2017    Head and neck cancer (HCC) [C76.0] 2017    Factor VIII deficiency ([facility] Nancy Maribeller 2015    Cigarette nicotine dependence with nicotine-induced disorder [F17.219]     COPD (chronic obstructive pulmonary disease) ([facility] [J44.9]     GERD (gastroesophageal reflux disease) [K21.9]        The patient was seen and examined on day of discharge and this discharge summary is in conjunction with any daily progress note from day of discharge. Hospital Course:   Elma Marie is a 40 y.o. male admitted to [address] [place] [address] on 10/3/2017 for shortness of breath. Patient's tracheostomy was changed. Had a bronchoscopy. Cultures were sent  Bronchial cultures showed pseudomonas  ID was consulted. Recommended morgan and cipro.    Workup was in progress, when patient signed out [place] as of 10/09/17  There are serious compliance issues from the patient's part, that may have a negative effect on the sequela from aspiration pneumonia and/or sequela from an atypical mycobacterial infectious pneumonia. Recommend continued    follow-up to document resolution. **This report has been created using voice recognition software. It may contain minor errors which are inherent in voice recognition technology. **          Final report electronically signed by Dr. Jeannette Henry on 10/3/2017 1:12 PM      XR CHEST STANDARD (2 VW)   Final Result   Mild accentuation of the bronchovascular markings, best demonstrated on the lateral view. Possible very tiny left pleural effusion posteriorly. **This report has been created using voice recognition software. It may contain minor errors which are inherent in voice recognition technology. **      Final report electronically signed by Dr. Basilio Del Real on 10/3/2017 12:02 PM      XR CHEST STANDARD (2 VW)    (Results Pending)          Consults:     IP CONSULT TO SOCIAL WORK  IP CONSULT TO PULMONOLOGY  PHARMACY TO DOSE VANCOMYCIN  IP CONSULT TO ONCOLOGY  IP CONSULT TO RADIATION ONCOLOGY  IP CONSULT TO INFECTIOUS DISEASES  IP CONSULT TO HOME CARE NEEDS    Disposition:    [x] Home       [] TCU       [] Rehab       [] Psych       [] SNF       [] [place]       [x] Other- AMA    Condition at Discharge: AMA    Code Status:  Full    Patient Instructions:    Discharge lab work: NA  Activity: activity as tolerated  Diet: DIET TUBE FEED CONTINUOUS/CYCLIC NPO; Fluid Restricted; Gastrostomy; Cyclic; 40; 75; 14      Follow-up visits:   Gianna Rodriguez, RICARDA  [address]  [address] [facility]  [phone]    In 1 week  follow up in 5-7 with Inez Pringle NP for pneumonia diagnosis    Cash Newsome MD  [facility]    In 6 weeks      [facility]/[address]  [phone]               Discharge Medications:      Erick Gillis   Home Medication Instructions

## 2017-10-09 NOTE — PLAN OF CARE
Problem: Risk for Impaired Skin Integrity  Goal: Tissue integrity - skin and mucous membranes  Structural intactness and normal physiological function of skin and  mucous membranes. Outcome: Ongoing  No skin breakdown noted at this time. Patient turns and repositions self. Problem: Pain:  Goal: Pain level will decrease  Pain level will decrease   Outcome: Ongoing  Patient complains of chronic pain. Patient has fentanyl patch in place. Patient also takes PRN Sublingual fentanyl spray for pain. Problem: Respiratory  Goal: No pulmonary complications  Outcome: Ongoing  Patients pulse ox 97% on room air. Patient wears trach mask at night. Problem: Nutrition  Goal: Optimal nutrition therapy  Outcome: Ongoing  Patient has continuous tube feed running from 1900 to 0900. Tube feed of 2 Basilio HN running at goal of 75 ml/hr. Patient tolerating well. Problem: Discharge Planning:  Goal: Patients continuum of care needs are met  Patients continuum of care needs are met   Outcome: Ongoing  Patient plans to return home at discharge. Problem: Falls - Risk of  Goal: Absence of falls  Outcome: Ongoing  Patient free from falls this shift. Patient ambulates independently in room and in hallway. Patient uses call light appropriately. Comments: Care plan reviewed with patient. Patient verbalizes understanding of the plan of care and contribute to goal setting.

## 2017-10-09 NOTE — PROGRESS NOTES
Dr. Steven Angelo rounding this morning, informed patient his inner canula for his trach should be changed every 8 hours. Patient refusing to have this done at this time, wants prn pain med. Med given and patient now wants to leave the unit to [facility] on a walk\". TF still infusing, battery keeps dying when not plugged in, TF supposed to be completed at 0900, half of TF remains. Patient educated on risk of leaving unit on pain medication and to call nurse when returning to unit.

## 2017-10-10 ENCOUNTER — TELEPHONE (OUTPATIENT)
Dept: INTERNAL MEDICINE CLINIC | Age: 47
End: 2017-10-10

## 2017-10-10 PROCEDURE — 77386 HC NTSTY MODUL RAD TX DLVR CPLX: CPT | Performed by: RADIOLOGY

## 2017-10-10 NOTE — CARE COORDINATION
10/10/17, 7:28 AM    DISCHARGE BARRIERS    Patient left AMA yesterday afternoon. HH not able to be set up at this time, home care order received however additional paperwork was not completed for patient to receive home care services (Medicaid face to face form).

## 2017-10-11 LAB — VIRAL CULTURE,RAPID,RESPIRATOR: NORMAL

## 2017-10-12 PROCEDURE — 77386 HC NTSTY MODUL RAD TX DLVR CPLX: CPT

## 2017-10-13 ENCOUNTER — TELEPHONE (OUTPATIENT)
Dept: WOUND CARE | Age: 47
End: 2017-10-13

## 2017-10-13 LAB — LEGIONELLA SPECIES CULTURE: NORMAL

## 2017-10-13 PROCEDURE — 77386 HC NTSTY MODUL RAD TX DLVR CPLX: CPT

## 2017-10-16 ENCOUNTER — HOSPITAL ENCOUNTER (OUTPATIENT)
Dept: RADIATION ONCOLOGY | Age: 47
Discharge: HOME | End: 2017-10-16
Payer: MEDICARE

## 2017-10-16 PROCEDURE — 77336 RADIATION PHYSICS CONSULT: CPT

## 2017-10-19 RX ORDER — FENTANYL 75 UG/H
1 PATCH TRANSDERMAL
Qty: 10 PATCH | Refills: 0 | Status: SHIPPED | OUTPATIENT
Start: 2017-10-19 | End: 2017-11-18

## 2017-10-19 NOTE — TELEPHONE ENCOUNTER
Sent by Dr. Nelson Ramos but Dr. Nelson Ramos needs a [facility]. Can you send the Rx in.  Thanks

## 2017-10-23 ENCOUNTER — HOSPITAL ENCOUNTER (OUTPATIENT)
Dept: RADIATION ONCOLOGY | Age: 47
Discharge: HOME | End: 2017-10-23
Payer: MEDICARE

## 2017-10-24 ENCOUNTER — HOSPITAL ENCOUNTER (EMERGENCY)
Age: 47
Discharge: HOME | End: 2017-10-24
Attending: FAMILY MEDICINE
Payer: MEDICARE

## 2017-10-24 ENCOUNTER — APPOINTMENT (OUTPATIENT)
Dept: GENERAL RADIOLOGY | Age: 47
End: 2017-10-24
Payer: MEDICARE

## 2017-10-24 VITALS
OXYGEN SATURATION: 94 % | BODY MASS INDEX: 16.8 KG/M2 | TEMPERATURE: 98.5 F | HEART RATE: 92 BPM | RESPIRATION RATE: 22 BRPM | SYSTOLIC BLOOD PRESSURE: 147 MMHG | HEIGHT: 71 IN | WEIGHT: 120 LBS | DIASTOLIC BLOOD PRESSURE: 98 MMHG

## 2017-10-24 DIAGNOSIS — J44.1 ACUTE EXACERBATION OF CHRONIC OBSTRUCTIVE PULMONARY DISEASE (COPD) (HCC): Primary | ICD-10-CM

## 2017-10-24 LAB
ALLEN TEST: ABNORMAL
ANION GAP SERPL CALCULATED.3IONS-SCNC: 13 MEQ/L (ref 8–16)
ANISOCYTOSIS: ABNORMAL
BASE EXCESS (CALCULATED): 3.2 MMOL/L (ref -2.5–2.5)
BASOPHILS # BLD: 0.5 %
BASOPHILS ABSOLUTE: 0 THOU/MM3 (ref 0–0.1)
BUN BLDV-MCNC: 12 MG/DL (ref 7–22)
CALCIUM SERPL-MCNC: 9.3 MG/DL (ref 8.5–10.5)
CHLORIDE BLD-SCNC: 92 MEQ/L (ref 98–111)
CO2: 27 MEQ/L (ref 23–33)
COLLECTED BY:: ABNORMAL
CREAT SERPL-MCNC: 0.5 MG/DL (ref 0.4–1.2)
DEVICE: ABNORMAL
EOSINOPHIL # BLD: 1.7 %
EOSINOPHILS ABSOLUTE: 0.2 THOU/MM3 (ref 0–0.4)
GFR SERPL CREATININE-BSD FRML MDRD: > 90 ML/MIN/1.73M2
GLUCOSE BLD-MCNC: 170 MG/DL (ref 70–108)
HCO3: 27 MMOL/L (ref 23–28)
HCT VFR BLD CALC: 41.4 % (ref 42–52)
HEMOGLOBIN: 13.9 GM/DL (ref 14–18)
IFIO2: 28
LACTIC ACID: 1.4 MMOL/L (ref 0.5–2.2)
LYMPHOCYTES # BLD: 4 %
LYMPHOCYTES ABSOLUTE: 0.4 THOU/MM3 (ref 1–4.8)
MACROCYTES: ABNORMAL
MCH RBC QN AUTO: 33.4 PG (ref 27–31)
MCHC RBC AUTO-ENTMCNC: 33.6 GM/DL (ref 33–37)
MCV RBC AUTO: 99.2 FL (ref 80–94)
MONOCYTES # BLD: 8.4 %
MONOCYTES ABSOLUTE: 0.8 THOU/MM3 (ref 0.4–1.3)
NUCLEATED RED BLOOD CELLS: 0 /100 WBC
O2 SATURATION: 88 %
OSMOLALITY CALCULATION: 268.3 MOSMOL/KG (ref 275–300)
PCO2: 40 MMHG (ref 35–45)
PDW BLD-RTO: 18.1 % (ref 11.5–14.5)
PH BLOOD GAS: 7.45 (ref 7.35–7.45)
PLATELET # BLD: 376 THOU/MM3 (ref 130–400)
PMV BLD AUTO: 9.1 MCM (ref 7.4–10.4)
PO2: 53 MMHG (ref 71–104)
POTASSIUM SERPL-SCNC: 3.2 MEQ/L (ref 3.5–5.2)
PROCALCITONIN: 0.04 NG/ML (ref 0.01–0.09)
RBC # BLD: 4.18 MILL/MM3 (ref 4.7–6.1)
SEG NEUTROPHILS: 85.4 %
SEGMENTED NEUTROPHILS ABSOLUTE COUNT: 8.4 THOU/MM3 (ref 1.8–7.7)
SODIUM BLD-SCNC: 132 MEQ/L (ref 135–145)
SOURCE, BLOOD GAS: ABNORMAL
TROPONIN T: < 0.01 NG/ML
WBC # BLD: 9.8 THOU/MM3 (ref 4.8–10.8)

## 2017-10-24 PROCEDURE — 36415 COLL VENOUS BLD VENIPUNCTURE: CPT

## 2017-10-24 PROCEDURE — 96374 THER/PROPH/DIAG INJ IV PUSH: CPT

## 2017-10-24 PROCEDURE — 87040 BLOOD CULTURE FOR BACTERIA: CPT

## 2017-10-24 PROCEDURE — 96361 HYDRATE IV INFUSION ADD-ON: CPT

## 2017-10-24 PROCEDURE — 85025 COMPLETE CBC W/AUTO DIFF WBC: CPT

## 2017-10-24 PROCEDURE — 94640 AIRWAY INHALATION TREATMENT: CPT

## 2017-10-24 PROCEDURE — 71010 XR CHEST PORTABLE: CPT

## 2017-10-24 PROCEDURE — 80048 BASIC METABOLIC PNL TOTAL CA: CPT

## 2017-10-24 PROCEDURE — 6370000000 HC RX 637 (ALT 250 FOR IP): Performed by: FAMILY MEDICINE

## 2017-10-24 PROCEDURE — 83605 ASSAY OF LACTIC ACID: CPT

## 2017-10-24 PROCEDURE — 99284 EMERGENCY DEPT VISIT MOD MDM: CPT

## 2017-10-24 PROCEDURE — 93005 ELECTROCARDIOGRAM TRACING: CPT

## 2017-10-24 PROCEDURE — S0028 INJECTION, FAMOTIDINE, 20 MG: HCPCS | Performed by: FAMILY MEDICINE

## 2017-10-24 PROCEDURE — 96375 TX/PRO/DX INJ NEW DRUG ADDON: CPT

## 2017-10-24 PROCEDURE — 6360000002 HC RX W HCPCS: Performed by: FAMILY MEDICINE

## 2017-10-24 PROCEDURE — 36600 WITHDRAWAL OF ARTERIAL BLOOD: CPT

## 2017-10-24 PROCEDURE — 82803 BLOOD GASES ANY COMBINATION: CPT

## 2017-10-24 PROCEDURE — 2500000003 HC RX 250 WO HCPCS: Performed by: FAMILY MEDICINE

## 2017-10-24 PROCEDURE — 2580000003 HC RX 258: Performed by: FAMILY MEDICINE

## 2017-10-24 PROCEDURE — 84484 ASSAY OF TROPONIN QUANT: CPT

## 2017-10-24 PROCEDURE — 84145 PROCALCITONIN (PCT): CPT

## 2017-10-24 RX ORDER — AMOXICILLIN AND CLAVULANATE POTASSIUM 250; 62.5 MG/5ML; MG/5ML
500 POWDER, FOR SUSPENSION ORAL 3 TIMES DAILY
Qty: 300 ML | Refills: 0 | Status: SHIPPED | OUTPATIENT
Start: 2017-10-24 | End: 2017-11-03

## 2017-10-24 RX ORDER — 0.9 % SODIUM CHLORIDE 0.9 %
1500 INTRAVENOUS SOLUTION INTRAVENOUS ONCE
Status: COMPLETED | OUTPATIENT
Start: 2017-10-24 | End: 2017-10-24

## 2017-10-24 RX ORDER — POTASSIUM CHLORIDE 750 MG/1
10 CAPSULE, EXTENDED RELEASE ORAL 4 TIMES DAILY
Qty: 60 CAPSULE | Refills: 0 | Status: SHIPPED | OUTPATIENT
Start: 2017-10-24

## 2017-10-24 RX ORDER — ONDANSETRON 2 MG/ML
4 INJECTION INTRAMUSCULAR; INTRAVENOUS ONCE
Status: COMPLETED | OUTPATIENT
Start: 2017-10-24 | End: 2017-10-24

## 2017-10-24 RX ORDER — IPRATROPIUM BROMIDE AND ALBUTEROL SULFATE 2.5; .5 MG/3ML; MG/3ML
1 SOLUTION RESPIRATORY (INHALATION) ONCE
Status: COMPLETED | OUTPATIENT
Start: 2017-10-24 | End: 2017-10-24

## 2017-10-24 RX ORDER — SODIUM CHLORIDE 9 MG/ML
INJECTION, SOLUTION INTRAVENOUS CONTINUOUS
Status: DISCONTINUED | OUTPATIENT
Start: 2017-10-24 | End: 2017-10-24 | Stop reason: HOSPADM

## 2017-10-24 RX ORDER — RANITIDINE HYDROCHLORIDE 15 MG/ML
150 SOLUTION ORAL 2 TIMES DAILY
Qty: 480 ML | Refills: 1 | Status: SHIPPED | OUTPATIENT
Start: 2017-10-24

## 2017-10-24 RX ADMIN — SODIUM CHLORIDE 1500 ML: 9 INJECTION, SOLUTION INTRAVENOUS at 16:54

## 2017-10-24 RX ADMIN — FAMOTIDINE 20 MG: 10 INJECTION, SOLUTION INTRAVENOUS at 16:52

## 2017-10-24 RX ADMIN — IPRATROPIUM BROMIDE AND ALBUTEROL SULFATE 1 AMPULE: .5; 3 SOLUTION RESPIRATORY (INHALATION) at 16:56

## 2017-10-24 RX ADMIN — ONDANSETRON 4 MG: 2 INJECTION INTRAMUSCULAR; INTRAVENOUS at 16:52

## 2017-10-24 ASSESSMENT — ENCOUNTER SYMPTOMS
DIARRHEA: 0
WHEEZING: 1
COUGH: 1
SHORTNESS OF BREATH: 1
EYE DISCHARGE: 0

## 2017-10-24 ASSESSMENT — PAIN DESCRIPTION - LOCATION: LOCATION: CHEST

## 2017-10-24 ASSESSMENT — PAIN DESCRIPTION - PAIN TYPE: TYPE: ACUTE PAIN

## 2017-10-24 ASSESSMENT — PAIN SCALES - GENERAL: PAINLEVEL_OUTOF10: 2

## 2017-10-24 NOTE — ED NOTES
Pt sitting up in bed. Pt requesting to be suctioned. Resp called. Alisonjus Noonanells verbalized she would be down shortly. Pt denies all other needs. Resp regular. Call light in reach.      Demetra Castro RN  10/24/17 8509

## 2017-10-24 NOTE — ED NOTES
Pt resting in bed, family at side. Denies needs. Call light in reach.      Karis Yu RN  10/24/17 1848

## 2017-10-24 NOTE — ED NOTES
Pt requesting pain medication. Dr. Roberta Klein notified. Dr. Roberta Klein states will order tylenol if pt can take pills. Asked pt. Pt states he has a PEG tube. Informed pt doctor had ordered tylenol. Pt shakes his head and wife states that will not work for him. Pt requesting to take own fentanyl. Dr. Roberta Klein states pt can take own medication but he would not be ordering narcotics. Pt and family notified. Wife states she would go home and get pt medication.       Tamia Macias, JOSE  10/24/17 9668

## 2017-10-24 NOTE — ED NOTES
Dr. Mina Carl in room to update pt. Resp regular. Denies needs.       China Rojas, RN  10/24/17 8657

## 2017-10-24 NOTE — ED PROVIDER NOTES
grandfather. SOCIAL HISTORY      reports that he has been smoking Cigarettes. He has a 13.50 pack-year smoking history. He has never used smokeless tobacco. He reports that he drinks about 2.4 oz of alcohol per week . He reports that he does not use drugs. PHYSICAL EXAM     INITIAL VITALS:  height is 5' 11\" (1.803 m) and weight is 120 lb (54.4 kg). His rectal temperature is 98.5 °F (36.9 °C). His blood pressure is 147/98 (abnormal) and his pulse is 92. His respiration is 22 and oxygen saturation is 94%. Physical Exam   Constitutional: He is oriented to person, place, and time. Thin, frail    GCS 15   HENT:   Head: Normocephalic and atraumatic. Mild rhinorrhea   Eyes: Conjunctivae are normal. Pupils are equal, round, and reactive to light. Right eye exhibits no discharge. Left eye exhibits no discharge. Neck: Normal range of motion. Neck supple. Tracheostomy in place with increased yellowish secretions   Cardiovascular: Normal rate and regular rhythm. Pulmonary/Chest: He has wheezes. Scattered wheezes and rhonchi   Abdominal: Soft. Bowel sounds are normal. There is no tenderness. There is no rebound and no guarding. Epigastric PEG tube in place   Musculoskeletal: Normal range of motion. sarcopenic   Neurological: He is alert and oriented to person, place, and time. Skin: Skin is warm and dry. No rash noted. Psychiatric: He has a normal mood and affect. His behavior is normal.   Nursing note and vitals reviewed. DIFFERENTIAL DIAGNOSIS:   Acute bronchitis evaluate for pneumonia    History of \"reflux\"    Check for cardiac dysfunction    DIAGNOSTIC RESULTS     EKG: All EKG's are interpreted by the Emergency Department Physician who either signs or Co-signs this chart in the absence of a cardiologist.  EKG interpreted by Patrice Barrow MD:    EKG showed sinus rhythm with rate 106. QRS complexes show normal axis, normal conduction.   ST-T waves show no acute change        RADIOLOGY: non-plain film images(s) such as CT, Ultrasound and MRI are read by the radiologist.    XR Chest Portable   Final Result   No acute intrathoracic process. **This report has been created using voice recognition software. It may contain minor errors which are inherent in voice recognition technology. **      Final report electronically signed by Dr. Vale Prater on 10/24/2017 5:26 PM          LABS:   Labs Reviewed   CBC WITH AUTO DIFFERENTIAL - Abnormal; Notable for the following:        Result Value    RBC 4.18 (*)     Hemoglobin 13.9 (*)     Hematocrit 41.4 (*)     MCV 99.2 (*)     MCH 33.4 (*)     RDW 18.1 (*)     Segs Absolute 8.4 (*)     Lymphocytes # 0.4 (*)     All other components within normal limits   BASIC METABOLIC PANEL - Abnormal; Notable for the following:     Sodium 132 (*)     Potassium 3.2 (*)     Chloride 92 (*)     Glucose 170 (*)     All other components within normal limits   BLOOD GAS, ARTERIAL - Abnormal; Notable for the following:     PO2 53 (*)     Base Excess (Calculated) 3.2 (*)     All other components within normal limits   OSMOLALITY - Abnormal; Notable for the following:     Osmolality Calc 268.3 (*)     All other components within normal limits   CULTURE BLOOD #1   CULTURE BLOOD #2   RESPIRATORY CULTURE   TROPONIN   LACTIC ACID, PLASMA   PROCALCITONIN   ANION GAP   GLOMERULAR FILTRATION RATE, ESTIMATED       EMERGENCY DEPARTMENT COURSE:   Vitals:    Vitals:    10/24/17 1618 10/24/17 1619   BP: (!) 151/109    Pulse: 108    Resp: 25    Temp: 98.6 °F (37 °C)    TempSrc: Oral    SpO2: 90% 97%   Weight: 120 lb (54.4 kg)    Height: 5' 11\" (1.803 m)        4:43 PM: The patient was seen and evaluated.     Nursing notes reviewed    IV hydration    DuoNeb aerosol    Pepcid plus Zofran     chest x-ray shows no pneumonia     WBC 9800    Pro calcitonin normal    Lactic acid normal    ABG showed adequate ventilation with some mild hypoxemia    He has Aerosols at home    He feels better after treatment    Patient indicates he prefer to go home as opposed to being admitted    Place him on Augmentin to cover  Bronchitis    Add Zantac for reflux    Potassium 3.2 so will increase potassium at home to 4 times daily        CRITICAL CARE:   none     CONSULTS:  none    PROCEDURES:  none     FINAL IMPRESSION      1. Acute exacerbation of chronic obstructive pulmonary disease (COPD) ([facility]          DISPOSITION/PLAN     Discharge home    PATIENT REFERRED TO:  your Primary Care Doctor    In 1 week        DISCHARGE MEDICATIONS:  New Prescriptions    AMOXICILLIN-CLAVULANATE (AUGMENTIN) 250-62.5 MG/5ML SUSPENSION    10 mLs by Per G Tube route three times daily for 10 days    RANITIDINE (ZANTAC) 75 MG/5ML SYRUP    10 mLs by Per G Tube route 2 times daily       (Please note that portions of this note were completed with a voice recognition program.  Efforts were made to edit the dictations but occasionally words are mis-transcribed.)    Scribe:  Leanne Zarate 10/24/17 4:43 PM Scribing for and in the presence of Neri Roberts MD.    Signed by: Adair Rodriguez, 10/24/17 7:04 PM    Provider:  I personally performed the services described in the documentation, reviewed and edited the documentation which was dictated to the scribe in my presence, and it accurately records my words and actions.     Neri Roberts MD  10/24/17 7:04 PM        Nrei Roberts MD  10/24/17 6219

## 2017-10-25 ENCOUNTER — CLINICAL DOCUMENTATION (OUTPATIENT)
Dept: NUTRITION | Age: 47
End: 2017-10-25

## 2017-10-25 ENCOUNTER — TELEPHONE (OUTPATIENT)
Dept: ONCOLOGY | Age: 47
End: 2017-10-25

## 2017-10-25 LAB
EKG ATRIAL RATE: 106 BPM
EKG P AXIS: 62 DEGREES
EKG P-R INTERVAL: 158 MS
EKG Q-T INTERVAL: 352 MS
EKG QRS DURATION: 80 MS
EKG QTC CALCULATION (BAZETT): 467 MS
EKG R AXIS: 85 DEGREES
EKG T AXIS: 74 DEGREES
EKG VENTRICULAR RATE: 106 BPM

## 2017-10-25 PROCEDURE — 77386 HC NTSTY MODUL RAD TX DLVR CPLX: CPT | Performed by: RADIOLOGY

## 2017-10-25 NOTE — TELEPHONE ENCOUNTER
Burton Pillai patient's wife is calling in stating that patient would like to come in to have IV hydration today, she was told not today, but possibly tomorrow because it is too late in the day once he is done with radiation therapy. Burton Pillai also wanted to know when patient's next chemotherapy is. Per Dr. Jerilyn Jhaveri patient needs to be seen by her before she can start patient back on chemo.  I called Burton Pillai and left a message that patient was put on Dr. Ion Altamirano schedule for tomorrow at 9:00

## 2017-10-26 ENCOUNTER — TELEPHONE (OUTPATIENT)
Dept: PULMONOLOGY | Age: 47
End: 2017-10-26

## 2017-10-26 NOTE — TELEPHONE ENCOUNTER
Jean Paul Kuhn (significant other-on HIPAA) called on behalf of Kraig Renae. Kraig Renae had a Trach placed at [facility] and was recently seen in [facility] 10/7/2017 due to pneumonia. He was instructed to have 3 month follow up here in office and was also to be scheduled for trach change every 3 months at [facility] out patient with similar trach I.e #[id number] emily. Kraig Renae is also in need of order for trach supplies and would like to use DME [facility]. Please advise.

## 2017-10-27 NOTE — TELEPHONE ENCOUNTER
Orders Sent to [facility] and Scheduled trach change with Aaron Tamayo. Left message for patient to confirm appointments.

## 2017-10-30 ENCOUNTER — HOSPITAL ENCOUNTER (OUTPATIENT)
Dept: RADIATION ONCOLOGY | Age: 47
Discharge: HOME | End: 2017-10-30
Payer: MEDICARE

## 2017-10-30 ENCOUNTER — TELEPHONE (OUTPATIENT)
Dept: ONCOLOGY | Age: 47
End: 2017-10-30

## 2017-10-30 LAB
BLOOD CULTURE, ROUTINE: NORMAL
BLOOD CULTURE, ROUTINE: NORMAL

## 2017-10-30 NOTE — TELEPHONE ENCOUNTER
Patient's significant other, Nitesh Conner called this morning at 9:30 am. She wanted to know when Anderson Maria was supposed to receive his last chemo treatment. They (her and Anderson Maria) couldn't remember if it was 10/26 or 11/26. A call was placed to radiation therapy and the patient has only received 1 radiation treatment in the last week (on 10/25) because of not showing up for his appointments and did not show today. Dr. Allegra Jansen was informed. Patient is to be informed per Dr. Allegra Jansen that without a consistent attendance for radiation treatments, that his chemotherapy would be held. A phone call was placed back to patient to inform him . There was no answer. A Voice mail was left to return the call.

## 2017-10-31 NOTE — PROGRESS NOTES
concurrent chemoradiotherapy. The patient was seen in hematology/oncology and radiation oncology at [address] [address]. Evaluation was delayed due to multiple no-shows and cancellations. Eventually, Mr. Lucinda Brown signed a patient contract in which he agreed to comply with the requirements of his radiation therapy. Pain Ratin-3/10 (throat - occasionally up to 6/10)  ECOG Performance Status: 1-2    Treatment Tolerance/Response: Mr. Lucinda Brown tolerated treatment generally as expected. He developed confluent mucositis and brisk skin reaction. As he neared the long term point during his treatment course, tumor response was evident. His pain also became much better controlled at this point and after 2017 he rated his pain is only 0-3/10. At about that time, he also began to miss treatment appointments. As of 10/31/2017, he had had only 1 treatment in 18 days. Overall, he has either called to cancel treatment or failed to show for treatment a total of 21 times, and came for treatment 27 times. During the last 3 weeks, he was called repeatedly regarding his status. He complained of feeling poorly and was advised to go to the emergency Department. He initially refused to do so, and then when he did go to the emergency department he declined hospital admission. Treatment delivered in haphazard fashion is not maximally effective. The patient was advised prior to start of treatment that prolonged interruptions in the treatment course would compromise the effectiveness. Mr. Lucinda Brown has been noncompliant with his patient agreement since 10/16/2017. Therefore, his treatment is being closed out at the current dose. A certified letter with attached copy of the patient contract agreement has been sent to the patient's last known address. Systemic Therapy: Concurrent cisplatin was initiated.   The patient has been dismissed from hematology/oncology due to noncompliance/failing to show for appointments. Follow Up Plan: At this time, I do not anticipate follow up with the patient due to his noncompliance issues. I spoke with Mr. Holly Dash today when he unexpectedly came to the radiation oncology clinic. He left abruptly, but I was able to speak with his mother. I told her that if Mr. Holly Dash wishes to undergo evaluation and treatment at another radiation oncology facility, we will  certainly provide our treatment records to the facility of his choice. Electronically signed by Alyse Emmanuel MD on 10/31/2017 at 5:59 PM    Radiation Oncology    Cc:  Mona Gregory; [facility]; [address] [address] Tumor Registry      This document was created using a voice recognition program.  Computer generated transcription errors may be present.

## 2017-11-06 LAB
FUNGUS IDENTIFIED: NORMAL
FUNGUS SMEAR: NORMAL

## 2017-11-17 LAB
AFB CULTURE & SMEAR: NORMAL
AFB SMEAR: NORMAL

## 2017-11-20 RX ORDER — TRAZODONE HYDROCHLORIDE 50 MG/1
50 TABLET ORAL NIGHTLY
Qty: 30 TABLET | Refills: 0 | Status: SHIPPED | OUTPATIENT
Start: 2017-11-20

## 2017-11-20 RX ORDER — TRAZODONE HYDROCHLORIDE 50 MG/1
50 TABLET ORAL NIGHTLY
Qty: 30 TABLET | Refills: 0 | Status: CANCELLED | OUTPATIENT
Start: 2017-11-20

## 2017-11-20 NOTE — TELEPHONE ENCOUNTER
OARRS reviewed. UDS: positive for morphine and norco, which patient was on at time of UDS. Last seen: 8/16/2017.  Follow-up: 12/20/17  Pt has has multiple cancellations

## 2017-11-21 RX ORDER — FENTANYL 75 UG/H
1 PATCH TRANSDERMAL
Qty: 10 PATCH | Refills: 0 | Status: SHIPPED | OUTPATIENT
Start: 2017-11-21 | End: 2017-12-21

## 2017-11-22 ENCOUNTER — TELEPHONE (OUTPATIENT)
Dept: PHYSICAL MEDICINE AND REHAB | Age: 47
End: 2017-11-22

## 2017-11-22 DIAGNOSIS — C10.0: Primary | ICD-10-CM

## 2017-11-22 DIAGNOSIS — M54.2 NECK PAIN: ICD-10-CM

## 2017-11-22 DIAGNOSIS — C80.1 NECK PAIN DUE TO MALIGNANT NEOPLASTIC DISEASE (HCC): ICD-10-CM

## 2017-11-22 DIAGNOSIS — M54.2 NECK PAIN DUE TO MALIGNANT NEOPLASTIC DISEASE (HCC): ICD-10-CM

## 2017-11-22 NOTE — TELEPHONE ENCOUNTER
OARRS reviewed. UDS: positive for morphine and norco, which patient was on at time of UDS. Last seen: 8/16/2017. Follow-up: 12/20/17, [facility] did not have in stock till Tuesday, so we sent to pharmacy at request in [facility].

## 2017-11-22 NOTE — TELEPHONE ENCOUNTER
Yes that is fine for wheelchair, [facility] will have to do so he can sign the order

## 2017-11-22 NOTE — TELEPHONE ENCOUNTER
Pharmacy called, they will not have Subsys in stock till Tuesday. LM for pt to call office back to find out which pharmacy to send script to.

## 2017-12-13 ENCOUNTER — TELEPHONE (OUTPATIENT)
Dept: PHYSICAL MEDICINE AND REHAB | Age: 47
End: 2017-12-13

## 2017-12-13 NOTE — TELEPHONE ENCOUNTER
Pt's wife called- wants to know if we will order a \"bridgett potty\", or a 3 way potty for pt. Left message to call back for clarification.

## 2017-12-18 ENCOUNTER — TELEPHONE (OUTPATIENT)
Dept: PHYSICAL MEDICINE AND REHAB | Age: 47
End: 2017-12-18

## 2017-12-21 ENCOUNTER — TELEPHONE (OUTPATIENT)
Dept: PHYSICAL MEDICINE AND REHAB | Age: 47
End: 2017-12-21

## 2017-12-21 NOTE — TELEPHONE ENCOUNTER
OARRS reviewed. UDS: consistent. Last seen: 8/16/2017. Follow-up: 1/8/18, only given enough to get him to next appt per [facility].

## 2022-05-25 NOTE — PLAN OF CARE
Last refilled 10/11/21 for 15 with 4 refills . Pravastatin 4/29/21 for 90 with 4 refills .  Last OV 4/11/22   Future Appointments   Date Time Provider [place]   7/21/2022  8:00 AM Marino Brooks MD [facility] BS AMB Problem: Intellectual/Education/Knowledge Deficit  Intervention: Verbal/written education provided  Discuss understanding to verbal information given on 500ml . 9NS iv hydration. Goal: Teaching initiated upon admission  Outcome: Met This Shift  Patient verbalizes understanding to verbal information given on 1 hour IV hydrations, action and possible side effects. Aware to call MD if develop complications. Problem: Discharge Planning  Intervention: Interaction with patient/family and care team  Verbalized understanding of discharge instructions, follow-up appointments, and when to call the physician. Goal: Knowledge of discharge instructions  Knowledge of discharge instructions   Outcome: Met This Shift  Discuss understanding of discharge instructions,follow-up appointments, and when to call the physician. Comments:   Care plan reviewed with patient and family. Patient and family verbalize understanding of the plan of care and contribute to goal setting.

## (undated) DEVICE — SOLUTION IV 1000ML 0.9% SOD CHL PH 5 INJ USP VIAFLX PLAS

## (undated) DEVICE — Device

## (undated) DEVICE — CONTAINER,SPECIMEN,PNEU TUBE,4OZ,OR STRL: Brand: MEDLINE

## (undated) DEVICE — APC PROBE 1500 A, SINGLE USE OD 1.5MM/4.5FR; L 1.5M/4.9FT: Brand: ERBE

## (undated) DEVICE — SOLUTION IV IRRIG POUR BRL 0.9% SODIUM CHL 2F7124

## (undated) DEVICE — SINGLE USE SUCTION VALVE MAJ-209: Brand: SINGLE USE SUCTION VALVE (STERILE)

## (undated) DEVICE — TRAP SPEC COLL 40CC MUCOUS CALIB UNIV CONN FOR OPN SUCT

## (undated) DEVICE — SINGLE USE BIOPSY VALVE MAJ-210: Brand: SINGLE USE BIOPSY VALVE (STERILE)

## (undated) DEVICE — MEDI-VAC NON-CONDUCTIVE SUCTION TUBING 6MM X 1.8M (6FT.) L: Brand: CARDINAL HEALTH